# Patient Record
Sex: FEMALE | Race: BLACK OR AFRICAN AMERICAN | NOT HISPANIC OR LATINO | Employment: FULL TIME | URBAN - METROPOLITAN AREA
[De-identification: names, ages, dates, MRNs, and addresses within clinical notes are randomized per-mention and may not be internally consistent; named-entity substitution may affect disease eponyms.]

---

## 2017-01-05 ENCOUNTER — OFFICE VISIT (OUTPATIENT)
Dept: PODIATRY | Facility: CLINIC | Age: 52
End: 2017-01-05
Payer: COMMERCIAL

## 2017-01-05 VITALS
WEIGHT: 196.31 LBS | HEART RATE: 105 BPM | DIASTOLIC BLOOD PRESSURE: 88 MMHG | HEIGHT: 59 IN | BODY MASS INDEX: 39.57 KG/M2 | SYSTOLIC BLOOD PRESSURE: 130 MMHG

## 2017-01-05 DIAGNOSIS — M77.8 EXTENSOR TENDINITIS OF FOOT: ICD-10-CM

## 2017-01-05 DIAGNOSIS — E11.9 TYPE 2 DIABETES MELLITUS WITHOUT COMPLICATION, WITHOUT LONG-TERM CURRENT USE OF INSULIN: ICD-10-CM

## 2017-01-05 DIAGNOSIS — M79.672 FOOT PAIN, LEFT: Primary | ICD-10-CM

## 2017-01-05 DIAGNOSIS — M21.40 PES PLANUS, UNSPECIFIED LATERALITY: ICD-10-CM

## 2017-01-05 PROCEDURE — 99999 PR PBB SHADOW E&M-EST. PATIENT-LVL III: CPT | Mod: PBBFAC,,, | Performed by: PODIATRIST

## 2017-01-05 PROCEDURE — 99204 OFFICE O/P NEW MOD 45 MIN: CPT | Mod: S$GLB,,, | Performed by: PODIATRIST

## 2017-01-05 PROCEDURE — 3044F HG A1C LEVEL LT 7.0%: CPT | Mod: S$GLB,,, | Performed by: PODIATRIST

## 2017-01-05 PROCEDURE — 3079F DIAST BP 80-89 MM HG: CPT | Mod: S$GLB,,, | Performed by: PODIATRIST

## 2017-01-05 PROCEDURE — 3060F POS MICROALBUMINURIA REV: CPT | Mod: S$GLB,,, | Performed by: PODIATRIST

## 2017-01-05 PROCEDURE — 3075F SYST BP GE 130 - 139MM HG: CPT | Mod: S$GLB,,, | Performed by: PODIATRIST

## 2017-01-05 PROCEDURE — 1159F MED LIST DOCD IN RCRD: CPT | Mod: S$GLB,,, | Performed by: PODIATRIST

## 2017-01-05 PROCEDURE — 2022F DILAT RTA XM EVC RTNOPTHY: CPT | Mod: S$GLB,,, | Performed by: PODIATRIST

## 2017-01-05 NOTE — LETTER
January 5, 2017      Malini Sullivan MD  [address]  [facility] [address]           [facility]  [address]  [facility] [address]  Phone: [phone]          Patient: Amanda Greenwood   MR Number: [id number]   YOB: 1965   Date of Visit: 1/5/2017       Dear Dr. Malini Sullivan:    Thank you for referring Amanda Greenwood to me for evaluation. Attached you will find relevant portions of my assessment and plan of care.    If you have questions, please do not hesitate to call me. I look forward to following Amanda Greenwood along with you.    Sincerely,    Dominik Capps, ANNA    Enclosure  CC:  No Recipients    If you would like to receive this communication electronically, please contact [email or web address] or [phone] to request more information on [facility] access.    For providers and/or their staff who would like to refer a patient to [facility], please contact us through our one-stop-shop provider referral line, [facility], at [phone].    If you feel you have received this communication in error or would no longer like to receive these types of communications, please e-mail [email or web address]

## 2017-01-05 NOTE — PROGRESS NOTES
"Subjective:      Patient ID: Amanda Greenwood is a 51 y.o. female.    Chief Complaint: Diabetes Mellitus (PCP 12/19/16 Dr. Sullivan); Diabetic Foot Exam; Routine Foot Care; Foot Problem (left ft./ xray done per PCP); and Foot Pain  Patient presents to clinic with the complaint of pain in the Lt. Dorsal midfoot x 4 months.  Describes pain as a combination of "crampy and aching", and currently rates symptoms as a 2/10.  States symptoms are exacerbated with prolonged weight bearing and walking.  Symptoms are alleviated with rest.  Relates having a previous callus to the plantar aspect of the same foot that was changing her gait on account of pain.  States that she recently trimmed the lesion, however, she continues to have the dorsal foot pain.  Denies trauma to the affected foot.  Has not attempted to self treat.  Denies any additional pedal complaints.        Past Medical History   Diagnosis Date    Obesity, Class II, BMI 35-39.9, with comorbidity 12/19/2016    Vitamin D deficiency disease 3/24/2015       Past Surgical History   Procedure Laterality Date    Hysterectomy       TLH WITHOUT BSO       Family History   Problem Relation Age of Onset    Diabetes Father     COPD Father     Hypertension Mother     Hypertension Brother     No Known Problems Daughter     No Known Problems Daughter     Glaucoma Neg Hx     Breast cancer Neg Hx     Colon cancer Neg Hx     Ovarian cancer Neg Hx        Social History     Social History    Marital status:      Spouse name: N/A    Number of children: 2    Years of education: N/A     Social History Main Topics    Smoking status: Never Smoker    Smokeless tobacco: Never Used    Alcohol use Yes      Comment: ocassionally    Drug use: No    Sexual activity: Yes     Partners: Male     Birth control/ protection: None     Other Topics Concern    None     Social History Narrative       Current Outpatient Prescriptions   Medication Sig Dispense Refill    amlodipine " (NORVASC) 10 MG tablet Take 1 tablet (10 mg total) by mouth once daily. 30 tablet 12    atorvastatin (LIPITOR) 20 MG tablet Take 1 tablet (20 mg total) by mouth once daily. 30 tablet 11    blood sugar diagnostic (ONE TOUCH ULTRA TEST) Strp 1 each by Misc.(Non-Drug; Combo Route) route daily as needed. 35 strip 6    lancets Misc 1 each by Misc.(Non-Drug; Combo Route) route once daily. 100 each 3    metformin (GLUCOPHAGE) 500 MG tablet Take 1 tablet (500 mg total) by mouth daily with breakfast. 30 tablet 12    triamterene-hydrochlorothiazide 37.5-25 mg (DYAZIDE) 37.5-25 mg per capsule Take 1 capsule by mouth every morning. 30 capsule 12    VITAMIN D2 50,000 unit capsule Take 1 capsule (50,000 Units total) by mouth every 7 days. 12 capsule 11     No current facility-administered medications for this visit.        Review of patient's allergies indicates:   Allergen Reactions    Ace inhibitors      Other reaction(s): cough         Review of Systems   Constitution: Negative for chills and fever.   Cardiovascular: Negative for claudication and leg swelling.   Skin: Negative for color change and nail changes.   Musculoskeletal: Negative for arthritis, joint pain and joint swelling.   Neurological: Negative for numbness and paresthesias.   Psychiatric/Behavioral: Negative for altered mental status.           Objective:      Physical Exam   Constitutional: She is oriented to person, place, and time. She appears well-developed and well-nourished. No distress.   Cardiovascular:   Pulses:       Dorsalis pedis pulses are 2+ on the right side, and 2+ on the left side.        Posterior tibial pulses are 2+ on the right side, and 2+ on the left side.   CFT <3 seconds bilateral.  Pedal hair growth present bilateral.   No varicosities noted bilateral.  No bilateral lower extremity edema.  Toes are warm to touch bilateral.     Musculoskeletal: She exhibits tenderness. She exhibits no edema.   Muscle strength 5/5 in all muscle  groups bilateral.  No tenderness nor crepitation with ROM of foot/ankle joints bilateral.  Bilateral pes planus foot type.  Mild pain along the extensor tendons of the Lt. Midfoot. Negative for pain with passive flexion and extension of same sided toes.     Neurological: She is alert and oriented to person, place, and time. She has normal strength. No sensory deficit.   Protective sensation per Meherrin-Gayatri monofilament intact bilateral.    Light touch intact bilateral.   Skin: Skin is warm, dry and intact. Lesion noted. No abrasion, no bruising, no burn, no ecchymosis, no laceration, no petechiae and no rash noted. She is not diaphoretic. No cyanosis or erythema. No pallor. Nails show no clubbing.   Skin normal turgor, temperature, and texture bilateral.  Toenails x 10 appear normotrophic. Remnants of a recently trimmed porokeratosis of the Lt. sub 3rd metatarsal head.  Examination of the skin reveals no evidence of significant maceration, rashes, open lesions, suspicious appearing nevi or other concerning lesions.              Assessment:       Encounter Diagnoses   Name Primary?    Foot pain, left Yes    Extensor tendinitis of foot - Left Foot     Pes planus, unspecified laterality     Type 2 diabetes mellitus without complication, without long-term current use of insulin          Plan:       Amanda was seen today for diabetes mellitus, diabetic foot exam, routine foot care, foot problem and foot pain.    Diagnoses and all orders for this visit:    Foot pain, left    Extensor tendinitis of foot - Left Foot    Pes planus, unspecified laterality    Type 2 diabetes mellitus without complication, without long-term current use of insulin      I counseled the patient on her conditions, their implications and medical management.    Feel that patient was compensating to reduce pressure to prior porokeratosis of the Lt. Sub 3rd met head resulting in current extensor tendonitis.      Advised to RICE the affected  extremity to help with pain symptoms.    Advised to apply a topical analgesic to the affected foot as well.      Reviewed both shoes and insoles that provide better support.      Discussed avoidance of unsupportive shoes and barefoot walking x 6 weeks.    Reviewed x-rays which were negative for trauma     RTC in 1 year for annual diabetic exam.    Dominik Capps DPM

## 2017-01-05 NOTE — PATIENT INSTRUCTIONS
Recommend applying aspercream to the Lt. Foot daily to help with pain symptoms.    Recommend icing the Lt. Foot up to 30 minutes daily.    Recommend avoidance of flats and barefoot walking for the next 6 weeks.    Recommend wearing shoes with more support across the midfoot.

## 2017-03-18 DIAGNOSIS — I10 ESSENTIAL HYPERTENSION: ICD-10-CM

## 2017-03-18 RX ORDER — TRIAMTERENE AND HYDROCHLOROTHIAZIDE 37.5; 25 MG/1; MG/1
CAPSULE ORAL
Qty: 30 CAPSULE | Refills: 0 | Status: SHIPPED | OUTPATIENT
Start: 2017-03-18 | End: 2017-04-26 | Stop reason: SDUPTHER

## 2017-03-20 ENCOUNTER — TELEPHONE (OUTPATIENT)
Dept: INTERNAL MEDICINE | Facility: CLINIC | Age: 52
End: 2017-03-20

## 2017-03-20 DIAGNOSIS — E11.8 TYPE 2 DIABETES MELLITUS WITH COMPLICATION, WITHOUT LONG-TERM CURRENT USE OF INSULIN: Primary | ICD-10-CM

## 2017-03-20 NOTE — TELEPHONE ENCOUNTER
She no showed today.    This was for her physical.  She needs to have fasting labs and a urine and needs to see me for a physical, please contact her to reschedule everything.  Orders in for labs and urine.  Thank you

## 2017-03-21 NOTE — TELEPHONE ENCOUNTER
Call pt no answer left detail message on voice mail about appointments being reschedule for her physical   Apt schedule and mail

## 2017-04-12 ENCOUNTER — LAB VISIT (OUTPATIENT)
Dept: LAB | Facility: HOSPITAL | Age: 52
End: 2017-04-12
Attending: INTERNAL MEDICINE
Payer: COMMERCIAL

## 2017-04-12 DIAGNOSIS — E11.9 TYPE 2 DIABETES MELLITUS WITHOUT COMPLICATION: ICD-10-CM

## 2017-04-12 DIAGNOSIS — E78.2 MIXED HYPERLIPIDEMIA: ICD-10-CM

## 2017-04-12 DIAGNOSIS — E11.8 TYPE 2 DIABETES MELLITUS WITH COMPLICATION, WITHOUT LONG-TERM CURRENT USE OF INSULIN: ICD-10-CM

## 2017-04-12 DIAGNOSIS — E55.9 VITAMIN D DEFICIENCY DISEASE: ICD-10-CM

## 2017-04-12 LAB
25(OH)D3+25(OH)D2 SERPL-MCNC: 40 NG/ML
ALBUMIN SERPL BCP-MCNC: 3.6 G/DL
ALP SERPL-CCNC: 71 U/L
ALT SERPL W/O P-5'-P-CCNC: 22 U/L
ANION GAP SERPL CALC-SCNC: 12 MMOL/L
AST SERPL-CCNC: 22 U/L
BILIRUB SERPL-MCNC: 0.3 MG/DL
BUN SERPL-MCNC: 21 MG/DL
CALCIUM SERPL-MCNC: 9.9 MG/DL
CHLORIDE SERPL-SCNC: 98 MMOL/L
CHOLEST/HDLC SERPL: 3.9 {RATIO}
CO2 SERPL-SCNC: 29 MMOL/L
CREAT SERPL-MCNC: 0.8 MG/DL
EST. GFR  (AFRICAN AMERICAN): >60 ML/MIN/1.73 M^2
EST. GFR  (NON AFRICAN AMERICAN): >60 ML/MIN/1.73 M^2
GLUCOSE SERPL-MCNC: 123 MG/DL
HDL/CHOLESTEROL RATIO: 25.5 %
HDLC SERPL-MCNC: 192 MG/DL
HDLC SERPL-MCNC: 49 MG/DL
LDLC SERPL CALC-MCNC: 101.2 MG/DL
NONHDLC SERPL-MCNC: 143 MG/DL
POTASSIUM SERPL-SCNC: 4 MMOL/L
PROT SERPL-MCNC: 8.6 G/DL
SODIUM SERPL-SCNC: 139 MMOL/L
TRIGL SERPL-MCNC: 209 MG/DL

## 2017-04-12 PROCEDURE — 36415 COLL VENOUS BLD VENIPUNCTURE: CPT | Mod: PO

## 2017-04-12 PROCEDURE — 80061 LIPID PANEL: CPT

## 2017-04-12 PROCEDURE — 80053 COMPREHEN METABOLIC PANEL: CPT

## 2017-04-12 PROCEDURE — 83036 HEMOGLOBIN GLYCOSYLATED A1C: CPT

## 2017-04-12 PROCEDURE — 82306 VITAMIN D 25 HYDROXY: CPT

## 2017-04-13 ENCOUNTER — PATIENT MESSAGE (OUTPATIENT)
Dept: INTERNAL MEDICINE | Facility: CLINIC | Age: 52
End: 2017-04-13

## 2017-04-13 LAB
ESTIMATED AVG GLUCOSE: 143 MG/DL
ESTIMATED AVG GLUCOSE: 143 MG/DL
HBA1C MFR BLD HPLC: 6.6 %
HBA1C MFR BLD HPLC: 6.6 %

## 2017-04-26 ENCOUNTER — TELEPHONE (OUTPATIENT)
Dept: INTERNAL MEDICINE | Facility: CLINIC | Age: 52
End: 2017-04-26

## 2017-04-26 ENCOUNTER — OFFICE VISIT (OUTPATIENT)
Dept: INTERNAL MEDICINE | Facility: CLINIC | Age: 52
End: 2017-04-26
Payer: COMMERCIAL

## 2017-04-26 VITALS
BODY MASS INDEX: 40 KG/M2 | DIASTOLIC BLOOD PRESSURE: 80 MMHG | SYSTOLIC BLOOD PRESSURE: 120 MMHG | WEIGHT: 198.44 LBS | HEIGHT: 59 IN

## 2017-04-26 DIAGNOSIS — E66.01 MORBID OBESITY WITH BMI OF 40.0-44.9, ADULT: ICD-10-CM

## 2017-04-26 DIAGNOSIS — E55.9 VITAMIN D DEFICIENCY DISEASE: ICD-10-CM

## 2017-04-26 DIAGNOSIS — E78.2 MIXED HYPERLIPIDEMIA: ICD-10-CM

## 2017-04-26 DIAGNOSIS — Z00.00 ANNUAL PHYSICAL EXAM: Primary | ICD-10-CM

## 2017-04-26 DIAGNOSIS — I10 ESSENTIAL HYPERTENSION: ICD-10-CM

## 2017-04-26 DIAGNOSIS — E11.8 TYPE 2 DIABETES MELLITUS WITH COMPLICATION, WITHOUT LONG-TERM CURRENT USE OF INSULIN: ICD-10-CM

## 2017-04-26 PROCEDURE — 99396 PREV VISIT EST AGE 40-64: CPT | Mod: S$GLB,,, | Performed by: INTERNAL MEDICINE

## 2017-04-26 PROCEDURE — 3079F DIAST BP 80-89 MM HG: CPT | Mod: S$GLB,,, | Performed by: INTERNAL MEDICINE

## 2017-04-26 PROCEDURE — 99999 PR PBB SHADOW E&M-EST. PATIENT-LVL III: CPT | Mod: PBBFAC,,, | Performed by: INTERNAL MEDICINE

## 2017-04-26 PROCEDURE — 3074F SYST BP LT 130 MM HG: CPT | Mod: S$GLB,,, | Performed by: INTERNAL MEDICINE

## 2017-04-26 RX ORDER — PHENTERMINE HYDROCHLORIDE 37.5 MG/1
37.5 TABLET ORAL
Qty: 30 TABLET | Refills: 0 | Status: SHIPPED | OUTPATIENT
Start: 2017-04-26 | End: 2017-04-26 | Stop reason: SDUPTHER

## 2017-04-26 RX ORDER — ASPIRIN 81 MG/1
81 TABLET ORAL DAILY
Qty: 30 TABLET | Refills: 12
Start: 2017-04-26 | End: 2018-06-29 | Stop reason: SDUPTHER

## 2017-04-26 RX ORDER — PHENTERMINE HYDROCHLORIDE 37.5 MG/1
37.5 TABLET ORAL
Qty: 30 TABLET | Refills: 0 | Status: SHIPPED | OUTPATIENT
Start: 2017-04-26 | End: 2017-06-06 | Stop reason: SDUPTHER

## 2017-04-26 RX ORDER — ATORVASTATIN CALCIUM 20 MG/1
20 TABLET, FILM COATED ORAL DAILY
Qty: 30 TABLET | Refills: 11 | Status: SHIPPED | OUTPATIENT
Start: 2017-04-26 | End: 2018-06-29 | Stop reason: SDUPTHER

## 2017-04-26 NOTE — TELEPHONE ENCOUNTER
----- Message from Ani Kaplan sent at 4/26/2017 12:18 PM CDT -----  Contact: Pt at [phone]  Pt said diet pill that was supposed to be called in to pharmacy was not called in.     [facility]  [phone] (Phone)  [phone] (Fax)

## 2017-04-26 NOTE — PROGRESS NOTES
Subjective:       Patient ID: Amanda Greenwood is a 51 y.o. female.    Chief Complaint: Annual Exam    HPI Comments: Annual exam    Doing pretty well, A1c 6.6- discussed.    Weight up- BNI 40- reviewed.  Options for Adipex, bariatric medicine.   Exercise reviewed.    Poor sleep- reviewed.  Falls asleep but wakes up a lot.  Caffeine and sleep hygiene reviewed.  No CARLOS sx.    Patient Active Problem List:     Mixed hyperlipidemia     Vitamin D deficiency disease     Essential hypertension     Type 2 diabetes mellitus with complication, without long-term current use of insulin     Morbid obesity with BMI of 40.0-44.9, adult          Review of Systems   Constitutional: Negative for activity change, appetite change, chills, fatigue and fever.   HENT: Negative for ear discharge, nosebleeds, sinus pressure and sore throat.    Eyes: Negative for visual disturbance.   Respiratory: Negative for apnea, cough, chest tightness, shortness of breath and wheezing.    Cardiovascular: Negative for chest pain, palpitations and leg swelling.   Gastrointestinal: Negative for abdominal distention, abdominal pain, anal bleeding, blood in stool, constipation, diarrhea, nausea and vomiting.   Genitourinary: Negative for dysuria, frequency, hematuria and vaginal bleeding.   Musculoskeletal: Negative for gait problem, joint swelling and myalgias.   Skin: Negative for rash.   Neurological: Negative for dizziness, tremors, weakness, light-headedness and headaches.   Hematological: Negative for adenopathy. Does not bruise/bleed easily.   Psychiatric/Behavioral: Positive for sleep disturbance. Negative for confusion, hallucinations and suicidal ideas.       Objective:      Physical Exam   Constitutional: She is oriented to person, place, and time. She appears well-developed and well-nourished.   HENT:   Head: Normocephalic and atraumatic.   Right Ear: External ear normal.   Left Ear: External ear normal.   Nose: Nose normal.   Mouth/Throat: Oropharynx  is clear and moist. No oropharyngeal exudate.   Eyes: Conjunctivae and EOM are normal. No scleral icterus.   Neck: Normal range of motion. Neck supple. No JVD present. No thyromegaly present.   Cardiovascular: Normal rate, regular rhythm, normal heart sounds and intact distal pulses.  Exam reveals no gallop.    No murmur heard.  Pulmonary/Chest: Effort normal and breath sounds normal. No respiratory distress. She has no wheezes. She exhibits no tenderness.   Abdominal: Soft. Bowel sounds are normal. She exhibits no distension and no mass. There is no tenderness. There is no rebound and no guarding.   Musculoskeletal: Normal range of motion. She exhibits no edema or tenderness.   Lymphadenopathy:     She has no cervical adenopathy.   Neurological: She is alert and oriented to person, place, and time. She displays normal reflexes. No cranial nerve deficit. Coordination normal.   Skin: Skin is warm. No rash noted. No erythema.   Psychiatric: She has a normal mood and affect. Her behavior is normal. Judgment and thought content normal.   Nursing note and vitals reviewed.      Assessment:       1. Annual physical exam    2. Morbid obesity with BMI of 40.0-44.9, adult    3. Mixed hyperlipidemia    4. Type 2 diabetes mellitus with complication, without long-term current use of insulin    5. Essential hypertension        Plan:         Annual physical exam    Morbid obesity with BMI of 40.0-44.9, adult  -     phentermine (ADIPEX-P) 37.5 mg tablet; Take 1 tablet (37.5 mg total) by mouth before breakfast.  Dispense: 30 tablet; Refill: 0. Cautions and s/e reviewed  -     Ambulatory consult to Bariatric Surgery    Mixed hyperlipidemia  -     atorvastatin (LIPITOR) 20 MG tablet; Take 1 tablet (20 mg total) by mouth once daily.  Dispense: 30 tablet; Refill: 11- cautions and s/e reviewed    Type 2 diabetes mellitus with complication, without long-term current use of insulin: stable    Essential hypertension: continue meds    Other  orders  -     aspirin (ECOTRIN) 81 MG EC tablet; Take 1 tablet (81 mg total) by mouth once daily.  Dispense: 30 tablet; Refill: 12    Labs in 6 months  F/u by phone/email 1 month weight and BP check in  Bariatric medicine  Lifestyle issues reviewed  Sleep hygiene reviewed

## 2017-04-26 NOTE — PATIENT INSTRUCTIONS
Insomnia  Insomnia is repeated difficulty going to sleep or staying asleep, or both. Whether you have insomnia is not defined by a specific amount of sleep. Different people need different amounts of sleep, and you may need more or less sleep at different times of your life.  There are 3 major types of insomnia:  short-term, chronic, and other.  Short-term, or acute insomnia lasts less than 3 months.  The symptoms are temporary and can be linked directly to a stressor, such as the death of a loved one, financial problems, or a new physical problem.  Short-term insomnia stops when the stressor resolves or the person adapts to its presence.  Chronic insomnia occurs at least 3 times a week and lasts longer than 3 months.  Chronic insomnia can occur when either the cause of the sleeping problem is not clear, or the insomnia does not get better when the stressor is resolved. A number of other criteria are also used to make the diagnosis of chronic insomnia.   Other insomnia is the third type of insomnia-related sleep disorders.  This description applies to people who have problems getting to sleep or staying asleep, but do not meet all of the factors that describe either short-term or chronic insomnia.    Many things cause insomnia. Different people may have different causes. It can be from an underlying medical or psychological condition, or lifestyle. It can also be primary insomnia, which means no cause can be found.  Causes of insomnia include:  · Chronic medical problems- heart disease, gastrointestinal problems, hormonal changes, breathing problems  · Anxiety  · Stress  · Depression  · Pain  · Work schedule  · Sleep apnea  · Illegal drugs  · Certain medicines  Many different medidcines can affect your sleep, such as stimulants, caffeine, alcohol, some decongestants, and diet pills. Other medicines may include some types of blood pressure pills, steroids, asthma medicines, antihistamines, antidepressants,  seizure medicines and statins. Not all of these will affect your sleep, and they shouldnt be stopped without talking to your doctor.  Symptoms of insomnia can include:  · Lying awake for long periods at night before falling asleep  · Waking up several times during the night  · Waking up early in the morning and not being able to get back to sleep  · Feeling tired and not refreshed by sleep  · Not being able to function properly during the day and finding it hard to concentrate  · Irritability  · Tiredness and fatigue during the day  Home care  1. Review your medicines with your doctor or pharmacist to find out if they can cause insomnia. Not all medicines will affect your sleep, but they shouldn't be stopped without reviewing them with your doctor. There may be serious side effects and consequences from suddenly stopping your medicines. Not taking them may cause strokes, heart attacks, and many other problems.  2. Caffeine, smoking and alcohol also affect sleep. Limit your daily use and do not use these before bedtime. Alcohol may make you sleepy at first, but as its effects wear off, you may awaken a few hours later and have trouble returning to sleep.  3. Do not exercise, eat or drink large amounts of liquid within 2 hours of your bedtime.  4. Improve your sleep habits. Have a fixed bed and wake-up time. Try to keep noise, light and heat in your bedroom at a comfortable level. Try using earplugs or eyeshades if needed.   5. Avoid watching TV in bed.  6. If you do not fall asleep within 30 minutes, try to relax by reading or listening to soft music.  7. Limit daytime napping to one 30 minute period, early in the day.  8. Get regular exercise. Find other ways to lessen your stress level.  9. If a medicine was prescribed to help reset your sleep patterns, take it as directed. Sleeping pills are intended for short-term use, only. If taken for too long, the effect wears off while the risk of physical addiction and  psychological dependence increases.  Sleep diary  If the cause isnt obvious and it is not improving, try keeping a sleep diary for a couple of weeks. Include in it:  · The time you go to bed  · How long it takes to fall asleep  · How many times you wake up  · What time you wake up  · Your meal times and what you eat  · What time you drink alcohol  · Your exercise habits and times  Follow-up care  Follow up with your healthcare provider, or as advised. If X-rays or CT scans were done, you will be notified if there is a change in the reading, especially if it affects treatment.  Call 911  Call 911 if any of these occur:  · Trouble breathing  · Confusion or trouble waking  · Fainting or loss of consciousness  · Rapid heart rate  · New chest, arm, shoulder, neck or upper back pain  · Trouble with speech or vision, weakness of an arm or leg  · Trouble walking or talking, loss of balance, numbness or weakness in one side of your body, facial droop  When to seek medical advice  Call your healthcare provider right away if any of these occur:  · Extreme restlessness or irritability  · Confusion or hallucinations (seeing or hearing things that are not there)  · Anxiety, depression  · Several days without sleeping  Date Last Reviewed: 11/19/2015  © 4063-1036 [employer]. [address] [place] [address], [place] [address]. All rights reserved. This information is not intended as a substitute for professional medical care. Always follow your healthcare professional's instructions.        Treating Insomnia  Good sleeping habits are a key part of treatment. If needed, some medications may help you sleep better at first. Making healthy lifestyle changes and learning to relax can improve your sleep. Treating insomnia takes commitment, but trust that your efforts will pay off. Talk to your health care provider before taking any medication.    Healthy lifestyle  Your lifestyle affects your health and your sleep. Here are some  healthy habits:  · Keep a regular sleep schedule. Go to bed and get up at the same time each day.  · Exercise regularly. It may help you reduce stress. Avoid strenuous exercise for 2 to 4 hours before bedtime.  · Avoid or limit naps, especially in the late afternoon.  · Use your bed only for sleep and sex.  · Dont spend too much time in bed trying to fall asleep. If you cant fall asleep, get up and do something (no electronics) until you become tired and drowsy.  · Avoid or limit caffeine and nicotine for up to 6 hours before bedtime. They can keep you awake at night.   · Also avoid alcohol for at least 4 to 6 hours before bedtime. It may help you fall asleep at first, but you will have more awakenings throughout the night, and your sleep will not be restful.  Before bedtime  To sleep better every night, try these tips:  · Have a bedtime routine to let your body and mind know when its time to sleep.  · Think of going to bed as relaxing and enjoyable. Sleep will come sooner.  · If your worries dont let you sleep, write them down in a diary. Then close it, and go to bed.  · Make sure the room is not too hot or too cold. If its not dark enough, an eye mask can help. If its noisy, try using earplugs.  Learn to relax  Stress, anxiety, and body tension may keep you awake at night. To unwind before bedtime, try a warm bath, meditation, or yoga. Also, try the following:  · Deep breathing. Sit or lie back in a chair. Take a slow, deep breath. Hold it for 5 counts. Then breathe out slowly through your mouth. Keep doing this until you feel relaxed.  · Progressive muscle relaxation. Tense and then relax the muscles in your body as you breathe deeply. Start with your feet and work up your body to your neck and face.  Date Last Reviewed: 7/18/2015 © 2000-2016 [employer]. [address] [place] [address], [place] [address]. All rights reserved. This information is not intended as a substitute for professional medical  care. Always follow your healthcare professional's instructions.        What is Insomnia?  Do you have trouble falling asleep? Do you wake up often during the night? Or, maybe you wake up too early in the morning. You may be suffering from insomnia. Talk to your health care provider if it lasts longer than a few weeks and you feel tired most of the time.    What causes insomnia?  Some common causes of insomnia are:  · Medical problems such as pain, depression, medication side effects, or trouble breathing  · Circadian rhythm disorder, a shift in the bodys normal 24-hour activity cycle  · Lifestyle factors such as a changing sleep schedule, lack of exercise, or too much caffeine  · Sleep settings such as a poor mattress, noise, or a room thats too hot or too cold  · Stress such as problems at work, money worries, or family events  Talk to your health care provider  Describe your sleeping problems to your health care provider. Try to keep a daily sleep diary for a couple weeks. Write down the time you go to bed, the time you wake up, and anything that seems to affect your sleep. Your health care provider can work with you to develop a treatment plan. You may need to learn good sleeping habits and make some lifestyle changes. If you have any medical problems, these may need to be treated first.  Date Last Reviewed: 7/18/2015 © 2000-2016 [employer]. [address] [place] [address], [place] [address]. All rights reserved. This information is not intended as a substitute for professional medical care. Always follow your healthcare professional's instructions.

## 2017-04-26 NOTE — MR AVS SNAPSHOT
[facility]  [address]  [place] [address]  Phone: [phone]  Fax: [phone]                  Amanda Greenwood   2017 10:00 AM   Office Visit    Description:  Female : 1965   Provider:  Malini Sullivan MD   Department:  [facility]           Reason for Visit     Annual Exam           Diagnoses this Visit        Comments    Annual physical exam    -  Primary     Morbid obesity with BMI of 40.0-44.9, adult         Mixed hyperlipidemia         Type 2 diabetes mellitus with complication, without long-term current use of insulin         Essential hypertension         Vitamin D deficiency disease                To Do List           Goals (5 Years of Data)     None      Follow-Up and Disposition     Return in about 6 months (around 10/26/2017) for EP with labs prior.    Follow-up and Disposition History       These Medications        Disp Refills Start End    aspirin (ECOTRIN) 81 MG EC tablet 30 tablet 12 2017    Take 1 tablet (81 mg total) by mouth once daily. - Oral    Pharmacy: [facility] [address] [place] AT [facility] Ph #: [phone]       phentermine (ADIPEX-P) 37.5 mg tablet 30 tablet 0 2017    Take 1 tablet (37.5 mg total) by mouth before breakfast. - Oral    Pharmacy: [facility] [address] [place] [facility] Ph #: [phone]       atorvastatin (LIPITOR) 20 MG tablet 30 tablet 11 2017    Take 1 tablet (20 mg total) by mouth once daily. - Oral    Pharmacy: [facility] [address] [place] [address] [place] AT [facility] Ph #: [phone]         [facility]     [facility] Nurse Care Line - / Assistance  Unless otherwise directed by your provider, please contact [facility], our nurse care line that is available for 24/ assistance.     Registered  nurses in the [facility] provide: appointment scheduling, clinical advisement, health education, and other advisory services.  Call: [phone] (toll free)               Medications           Message regarding Medications     Verify the changes and/or additions to your medication regime listed below are the same as discussed with your clinician today.  If any of these changes or additions are incorrect, please notify your healthcare provider.        START taking these NEW medications        Refills    aspirin (ECOTRIN) 81 MG EC tablet 12    Sig: Take 1 tablet (81 mg total) by mouth once daily.    Class: No Print    Route: Oral    phentermine (ADIPEX-P) 37.5 mg tablet 0    Sig: Take 1 tablet (37.5 mg total) by mouth before breakfast.    Class: Print    Route: Oral           Verify that the below list of medications is an accurate representation of the medications you are currently taking.  If none reported, the list may be blank. If incorrect, please contact your healthcare provider. Carry this list with you in case of emergency.           Current Medications     amlodipine (NORVASC) 10 MG tablet Take 1 tablet (10 mg total) by mouth once daily.    blood sugar diagnostic (ONE TOUCH ULTRA TEST) Strp 1 each by Misc.(Non-Drug; Combo Route) route daily as needed.    lancets Misc 1 each by Misc.(Non-Drug; Combo Route) route once daily.    metformin (GLUCOPHAGE) 500 MG tablet Take 1 tablet (500 mg total) by mouth daily with breakfast.    triamterene-hydrochlorothiazide 37.5-25 mg (DYAZIDE) 37.5-25 mg per capsule Take 1 capsule by mouth every morning.    VITAMIN D2 50,000 unit capsule Take 1 capsule (50,000 Units total) by mouth every 7 days.    aspirin (ECOTRIN) 81 MG EC tablet Take 1 tablet (81 mg total) by mouth once daily.    atorvastatin (LIPITOR) 20 MG tablet Take 1 tablet (20 mg total) by mouth once daily.    phentermine (ADIPEX-P) 37.5 mg tablet Take 1 tablet (37.5 mg total) by mouth before breakfast.  "          Clinical Reference Information           Your Vitals Were     BP Height Weight BMI       120/80 (BP Location: Right arm, Patient Position: Sitting, BP Method: Manual) 4' 11" (1.499 m) 90 kg (198 lb 6.6 oz) 40.07 kg/m2       Blood Pressure          Most Recent Value    BP  120/80      Allergies as of 4/26/2017     Ace Inhibitors      Immunizations Administered on Date of Encounter - 4/26/2017     None      Orders Placed During Today's Visit      Normal Orders This Visit    Ambulatory consult to Bariatric Surgery     Future Labs/Procedures Expected by Expires    Hemoglobin A1c  10/23/2017 (Approximate) 12/2/2017    Vitamin D  10/23/2017 (Approximate) 12/2/2017    Comprehensive metabolic panel  10/27/2017 4/26/2018    Lipid panel  10/27/2017 4/26/2018      Instructions      Insomnia  Insomnia is repeated difficulty going to sleep or staying asleep, or both. Whether you have insomnia is not defined by a specific amount of sleep. Different people need different amounts of sleep, and you may need more or less sleep at different times of your life.  There are 3 major types of insomnia:  short-term, chronic, and other.  Short-term, or acute insomnia lasts less than 3 months.  The symptoms are temporary and can be linked directly to a stressor, such as the death of a loved one, financial problems, or a new physical problem.  Short-term insomnia stops when the stressor resolves or the person adapts to its presence.  Chronic insomnia occurs at least 3 times a week and lasts longer than 3 months.  Chronic insomnia can occur when either the cause of the sleeping problem is not clear, or the insomnia does not get better when the stressor is resolved. A number of other criteria are also used to make the diagnosis of chronic insomnia.   Other insomnia is the third type of insomnia-related sleep disorders.  This description applies to people who have problems getting to sleep or staying asleep, but do not meet all of " the factors that describe either short-term or chronic insomnia.    Many things cause insomnia. Different people may have different causes. It can be from an underlying medical or psychological condition, or lifestyle. It can also be primary insomnia, which means no cause can be found.  Causes of insomnia include:  · Chronic medical problems- heart disease, gastrointestinal problems, hormonal changes, breathing problems  · Anxiety  · Stress  · Depression  · Pain  · Work schedule  · Sleep apnea  · Illegal drugs  · Certain medicines  Many different medidcines can affect your sleep, such as stimulants, caffeine, alcohol, some decongestants, and diet pills. Other medicines may include some types of blood pressure pills, steroids, asthma medicines, antihistamines, antidepressants, seizure medicines and statins. Not all of these will affect your sleep, and they shouldnt be stopped without talking to your doctor.  Symptoms of insomnia can include:  · Lying awake for long periods at night before falling asleep  · Waking up several times during the night  · Waking up early in the morning and not being able to get back to sleep  · Feeling tired and not refreshed by sleep  · Not being able to function properly during the day and finding it hard to concentrate  · Irritability  · Tiredness and fatigue during the day  Home care  1. Review your medicines with your doctor or pharmacist to find out if they can cause insomnia. Not all medicines will affect your sleep, but they shouldn't be stopped without reviewing them with your doctor. There may be serious side effects and consequences from suddenly stopping your medicines. Not taking them may cause strokes, heart attacks, and many other problems.  2. Caffeine, smoking and alcohol also affect sleep. Limit your daily use and do not use these before bedtime. Alcohol may make you sleepy at first, but as its effects wear off, you may awaken a few hours later and have trouble returning  to sleep.  3. Do not exercise, eat or drink large amounts of liquid within 2 hours of your bedtime.  4. Improve your sleep habits. Have a fixed bed and wake-up time. Try to keep noise, light and heat in your bedroom at a comfortable level. Try using earplugs or eyeshades if needed.   5. Avoid watching TV in bed.  6. If you do not fall asleep within 30 minutes, try to relax by reading or listening to soft music.  7. Limit daytime napping to one 30 minute period, early in the day.  8. Get regular exercise. Find other ways to lessen your stress level.  9. If a medicine was prescribed to help reset your sleep patterns, take it as directed. Sleeping pills are intended for short-term use, only. If taken for too long, the effect wears off while the risk of physical addiction and psychological dependence increases.  Sleep diary  If the cause isnt obvious and it is not improving, try keeping a sleep diary for a couple of weeks. Include in it:  · The time you go to bed  · How long it takes to fall asleep  · How many times you wake up  · What time you wake up  · Your meal times and what you eat  · What time you drink alcohol  · Your exercise habits and times  Follow-up care  Follow up with your healthcare provider, or as advised. If X-rays or CT scans were done, you will be notified if there is a change in the reading, especially if it affects treatment.  Call 911  Call 911 if any of these occur:  · Trouble breathing  · Confusion or trouble waking  · Fainting or loss of consciousness  · Rapid heart rate  · New chest, arm, shoulder, neck or upper back pain  · Trouble with speech or vision, weakness of an arm or leg  · Trouble walking or talking, loss of balance, numbness or weakness in one side of your body, facial droop  When to seek medical advice  Call your healthcare provider right away if any of these occur:  · Extreme restlessness or irritability  · Confusion or hallucinations (seeing or hearing things that are not  there)  · Anxiety, depression  · Several days without sleeping  Date Last Reviewed: 11/19/2015  © 0120-4064 [employer]. [address] [place] [address], [place] [address]. All rights reserved. This information is not intended as a substitute for professional medical care. Always follow your healthcare professional's instructions.        Treating Insomnia  Good sleeping habits are a key part of treatment. If needed, some medications may help you sleep better at first. Making healthy lifestyle changes and learning to relax can improve your sleep. Treating insomnia takes commitment, but trust that your efforts will pay off. Talk to your health care provider before taking any medication.    Healthy lifestyle  Your lifestyle affects your health and your sleep. Here are some healthy habits:  · Keep a regular sleep schedule. Go to bed and get up at the same time each day.  · Exercise regularly. It may help you reduce stress. Avoid strenuous exercise for 2 to 4 hours before bedtime.  · Avoid or limit naps, especially in the late afternoon.  · Use your bed only for sleep and sex.  · Dont spend too much time in bed trying to fall asleep. If you cant fall asleep, get up and do something (no electronics) until you become tired and drowsy.  · Avoid or limit caffeine and nicotine for up to 6 hours before bedtime. They can keep you awake at night.   · Also avoid alcohol for at least 4 to 6 hours before bedtime. It may help you fall asleep at first, but you will have more awakenings throughout the night, and your sleep will not be restful.  Before bedtime  To sleep better every night, try these tips:  · Have a bedtime routine to let your body and mind know when its time to sleep.  · Think of going to bed as relaxing and enjoyable. Sleep will come sooner.  · If your worries dont let you sleep, write them down in a diary. Then close it, and go to bed.  · Make sure the room is not too hot or too cold. If its not dark  enough, an eye mask can help. If its noisy, try using earplugs.  Learn to relax  Stress, anxiety, and body tension may keep you awake at night. To unwind before bedtime, try a warm bath, meditation, or yoga. Also, try the following:  · Deep breathing. Sit or lie back in a chair. Take a slow, deep breath. Hold it for 5 counts. Then breathe out slowly through your mouth. Keep doing this until you feel relaxed.  · Progressive muscle relaxation. Tense and then relax the muscles in your body as you breathe deeply. Start with your feet and work up your body to your neck and face.  Date Last Reviewed: 7/18/2015  © 5644-6903 [employer]. [address] [place] [address], [place] [address]. All rights reserved. This information is not intended as a substitute for professional medical care. Always follow your healthcare professional's instructions.        What is Insomnia?  Do you have trouble falling asleep? Do you wake up often during the night? Or, maybe you wake up too early in the morning. You may be suffering from insomnia. Talk to your health care provider if it lasts longer than a few weeks and you feel tired most of the time.    What causes insomnia?  Some common causes of insomnia are:  · Medical problems such as pain, depression, medication side effects, or trouble breathing  · Circadian rhythm disorder, a shift in the bodys normal 24-hour activity cycle  · Lifestyle factors such as a changing sleep schedule, lack of exercise, or too much caffeine  · Sleep settings such as a poor mattress, noise, or a room thats too hot or too cold  · Stress such as problems at work, money worries, or family events  Talk to your health care provider  Describe your sleeping problems to your health care provider. Try to keep a daily sleep diary for a couple weeks. Write down the time you go to bed, the time you wake up, and anything that seems to affect your sleep. Your health care provider can work with you to develop a  treatment plan. You may need to learn good sleeping habits and make some lifestyle changes. If you have any medical problems, these may need to be treated first.  Date Last Reviewed: 7/18/2015  © 7073-9812 [employer]. [address] [place] [address], [place] [address]. All rights reserved. This information is not intended as a substitute for professional medical care. Always follow your healthcare professional's instructions.             Language Assistance Services     ATTENTION: Language assistance services are available, free of charge. Please call [phone].      ATENCIÓN: Si valentinla español, tiene a delgado disposición servicios gratuitos de asistencia lingüística. Llame al [phone].     KAREEN Ý: N?u b?n nói Ti?ng Vi?t, có các d?ch v? h? tr? ngôn ng? mi?n phí dành cho b?n. G?i s? [phone].         [facility] complies with applicable Federal civil rights laws and does not discriminate on the basis of race, color, national origin, age, disability, or sex.

## 2017-05-29 ENCOUNTER — PATIENT MESSAGE (OUTPATIENT)
Dept: INTERNAL MEDICINE | Facility: CLINIC | Age: 52
End: 2017-05-29

## 2017-05-29 DIAGNOSIS — E66.01 MORBID OBESITY WITH BMI OF 40.0-44.9, ADULT: ICD-10-CM

## 2017-06-06 RX ORDER — PHENTERMINE HYDROCHLORIDE 37.5 MG/1
37.5 TABLET ORAL
Qty: 30 TABLET | Refills: 0 | Status: SHIPPED | OUTPATIENT
Start: 2017-06-06 | End: 2017-07-06

## 2017-06-06 NOTE — TELEPHONE ENCOUNTER
She will go. She was under the impression that they were going to contact her. I advised her to call to schedule.

## 2017-06-06 NOTE — TELEPHONE ENCOUNTER
Spoke to pt--she advised that she is doing very well on Adipex. She have lost 5lbs. She only takes it M-F. She have been doing well with her BP---no issues. She would like to know if she can have a refill. I gave her the phone number to bariatric clinic.

## 2017-06-06 NOTE — TELEPHONE ENCOUNTER
Please call to check on how she is doing on the Adipex in terms of weight loss and also blood pressure.  Also, she was supposed to have been seen in the bariatric clinic but no appointment has been scheduled.  Please ask if she needs any assistance with this.  Thank you

## 2017-06-06 NOTE — TELEPHONE ENCOUNTER
Sure, meds refilled today.    If she opts to not go to the bariatric clinic, I would like to see her in about 2 months for follow-up.  Thank you

## 2018-01-01 DIAGNOSIS — E55.9 VITAMIN D DEFICIENCY DISEASE: ICD-10-CM

## 2018-01-01 DIAGNOSIS — I10 ESSENTIAL HYPERTENSION: ICD-10-CM

## 2018-01-02 RX ORDER — TRIAMTERENE AND HYDROCHLOROTHIAZIDE 37.5; 25 MG/1; MG/1
1 CAPSULE ORAL EVERY MORNING
Qty: 30 CAPSULE | Refills: 0 | Status: SHIPPED | OUTPATIENT
Start: 2018-01-02 | End: 2018-06-29

## 2018-01-02 RX ORDER — AMLODIPINE BESYLATE 10 MG/1
TABLET ORAL
Qty: 30 TABLET | Refills: 0 | Status: SHIPPED | OUTPATIENT
Start: 2018-01-02 | End: 2018-06-29 | Stop reason: SDUPTHER

## 2018-01-02 RX ORDER — ERGOCALCIFEROL 1.25 MG/1
CAPSULE ORAL
Qty: 12 CAPSULE | Refills: 0 | Status: SHIPPED | OUTPATIENT
Start: 2018-01-02 | End: 2018-06-29 | Stop reason: SDUPTHER

## 2018-04-26 ENCOUNTER — TELEPHONE (OUTPATIENT)
Dept: INTERNAL MEDICINE | Facility: CLINIC | Age: 53
End: 2018-04-26

## 2018-04-26 DIAGNOSIS — Z12.39 BREAST CANCER SCREENING: Primary | ICD-10-CM

## 2018-04-26 DIAGNOSIS — E11.9 CONTROLLED TYPE 2 DIABETES MELLITUS WITHOUT COMPLICATION, UNSPECIFIED WHETHER LONG TERM INSULIN USE: ICD-10-CM

## 2018-04-26 NOTE — TELEPHONE ENCOUNTER
She is overdue for physical:    Labs, mammogram, colonoscopy, eye exam, urine, podiatry    Can you call her to schedule all?  OK to use [facility]    Please let me know thanks

## 2018-04-27 ENCOUNTER — TELEPHONE (OUTPATIENT)
Dept: ADMINISTRATIVE | Facility: HOSPITAL | Age: 53
End: 2018-04-27

## 2018-04-27 NOTE — TELEPHONE ENCOUNTER
Spoke to pt. Annual w/ PCP, mammo & podiatry scheduled for 5/28/18. Labs and urine micro scheduled for 5/25/18. Pt sees an outside provider for eye care. She says she will bring results from recent exam to PCP appt.

## 2018-05-28 ENCOUNTER — TELEPHONE (OUTPATIENT)
Dept: INTERNAL MEDICINE | Facility: CLINIC | Age: 53
End: 2018-05-28

## 2018-05-28 NOTE — TELEPHONE ENCOUNTER
Please call her    She did not keep her appointment with me today and did not do labs or urine or mammogram or keep her podiatry appointment.    Is she seeing another doctor?    In order for me to continue to prescribe her medication, she will need an appointment with me.  Please let her know.

## 2018-05-29 NOTE — TELEPHONE ENCOUNTER
----- Message from Christiano Valdez sent at 5/29/2018  1:06 PM CDT -----  Contact: Patient [phone]  Patient is stating is returning a call back from the  Office.  Patient is requesting a call to schedule an appt Annual Physical for 06/29/18    Please call and advise  Thank you

## 2018-06-22 ENCOUNTER — PATIENT MESSAGE (OUTPATIENT)
Dept: INTERNAL MEDICINE | Facility: CLINIC | Age: 53
End: 2018-06-22

## 2018-06-22 ENCOUNTER — LAB VISIT (OUTPATIENT)
Dept: LAB | Facility: HOSPITAL | Age: 53
End: 2018-06-22
Attending: INTERNAL MEDICINE
Payer: COMMERCIAL

## 2018-06-22 DIAGNOSIS — E55.9 VITAMIN D DEFICIENCY DISEASE: ICD-10-CM

## 2018-06-22 DIAGNOSIS — E11.8 TYPE 2 DIABETES MELLITUS WITH COMPLICATION, WITHOUT LONG-TERM CURRENT USE OF INSULIN: ICD-10-CM

## 2018-06-22 DIAGNOSIS — E78.2 MIXED HYPERLIPIDEMIA: ICD-10-CM

## 2018-06-22 LAB
25(OH)D3+25(OH)D2 SERPL-MCNC: 23 NG/ML
ALBUMIN SERPL BCP-MCNC: 3.6 G/DL
ALP SERPL-CCNC: 93 U/L
ALT SERPL W/O P-5'-P-CCNC: 20 U/L
ANION GAP SERPL CALC-SCNC: 9 MMOL/L
AST SERPL-CCNC: 19 U/L
BILIRUB SERPL-MCNC: 0.4 MG/DL
BUN SERPL-MCNC: 13 MG/DL
CALCIUM SERPL-MCNC: 9.6 MG/DL
CHLORIDE SERPL-SCNC: 103 MMOL/L
CHOLEST SERPL-MCNC: 185 MG/DL
CHOLEST/HDLC SERPL: 4 {RATIO}
CO2 SERPL-SCNC: 30 MMOL/L
CREAT SERPL-MCNC: 0.7 MG/DL
EST. GFR  (AFRICAN AMERICAN): >60 ML/MIN/1.73 M^2
EST. GFR  (NON AFRICAN AMERICAN): >60 ML/MIN/1.73 M^2
ESTIMATED AVG GLUCOSE: 134 MG/DL
GLUCOSE SERPL-MCNC: 120 MG/DL
HBA1C MFR BLD HPLC: 6.3 %
HDLC SERPL-MCNC: 46 MG/DL
HDLC SERPL: 24.9 %
LDLC SERPL CALC-MCNC: 123.4 MG/DL
NONHDLC SERPL-MCNC: 139 MG/DL
POTASSIUM SERPL-SCNC: 4.2 MMOL/L
PROT SERPL-MCNC: 8.5 G/DL
SODIUM SERPL-SCNC: 142 MMOL/L
TRIGL SERPL-MCNC: 78 MG/DL

## 2018-06-22 PROCEDURE — 80061 LIPID PANEL: CPT

## 2018-06-22 PROCEDURE — 82306 VITAMIN D 25 HYDROXY: CPT

## 2018-06-22 PROCEDURE — 80053 COMPREHEN METABOLIC PANEL: CPT

## 2018-06-22 PROCEDURE — 36415 COLL VENOUS BLD VENIPUNCTURE: CPT | Mod: PO

## 2018-06-22 PROCEDURE — 83036 HEMOGLOBIN GLYCOSYLATED A1C: CPT

## 2018-06-29 ENCOUNTER — OFFICE VISIT (OUTPATIENT)
Dept: INTERNAL MEDICINE | Facility: CLINIC | Age: 53
End: 2018-06-29
Payer: COMMERCIAL

## 2018-06-29 ENCOUNTER — TELEPHONE (OUTPATIENT)
Dept: INTERNAL MEDICINE | Facility: CLINIC | Age: 53
End: 2018-06-29

## 2018-06-29 VITALS
BODY MASS INDEX: 40.44 KG/M2 | SYSTOLIC BLOOD PRESSURE: 145 MMHG | DIASTOLIC BLOOD PRESSURE: 90 MMHG | WEIGHT: 200.63 LBS | HEIGHT: 59 IN

## 2018-06-29 DIAGNOSIS — Z12.31 SCREENING MAMMOGRAM, ENCOUNTER FOR: ICD-10-CM

## 2018-06-29 DIAGNOSIS — Z00.00 ANNUAL PHYSICAL EXAM: Primary | ICD-10-CM

## 2018-06-29 DIAGNOSIS — E78.2 MIXED HYPERLIPIDEMIA: ICD-10-CM

## 2018-06-29 DIAGNOSIS — I10 ESSENTIAL HYPERTENSION: ICD-10-CM

## 2018-06-29 DIAGNOSIS — E55.9 VITAMIN D DEFICIENCY DISEASE: ICD-10-CM

## 2018-06-29 DIAGNOSIS — E11.29 TYPE 2 DIABETES MELLITUS WITH MICROALBUMINURIA, WITHOUT LONG-TERM CURRENT USE OF INSULIN: ICD-10-CM

## 2018-06-29 DIAGNOSIS — Z12.11 COLON CANCER SCREENING: ICD-10-CM

## 2018-06-29 DIAGNOSIS — R80.9 TYPE 2 DIABETES MELLITUS WITH MICROALBUMINURIA, WITHOUT LONG-TERM CURRENT USE OF INSULIN: ICD-10-CM

## 2018-06-29 DIAGNOSIS — E11.8 TYPE 2 DIABETES MELLITUS WITH COMPLICATION, WITHOUT LONG-TERM CURRENT USE OF INSULIN: ICD-10-CM

## 2018-06-29 DIAGNOSIS — E66.01 MORBID OBESITY WITH BMI OF 40.0-44.9, ADULT: ICD-10-CM

## 2018-06-29 PROCEDURE — 99999 PR PBB SHADOW E&M-EST. PATIENT-LVL IV: CPT | Mod: PBBFAC,,, | Performed by: INTERNAL MEDICINE

## 2018-06-29 PROCEDURE — 3077F SYST BP >= 140 MM HG: CPT | Mod: CPTII,S$GLB,, | Performed by: INTERNAL MEDICINE

## 2018-06-29 PROCEDURE — 3080F DIAST BP >= 90 MM HG: CPT | Mod: CPTII,S$GLB,, | Performed by: INTERNAL MEDICINE

## 2018-06-29 PROCEDURE — 99396 PREV VISIT EST AGE 40-64: CPT | Mod: S$GLB,,, | Performed by: INTERNAL MEDICINE

## 2018-06-29 PROCEDURE — 3044F HG A1C LEVEL LT 7.0%: CPT | Mod: CPTII,S$GLB,, | Performed by: INTERNAL MEDICINE

## 2018-06-29 RX ORDER — ATORVASTATIN CALCIUM 20 MG/1
20 TABLET, FILM COATED ORAL DAILY
Qty: 90 TABLET | Refills: 3 | Status: SHIPPED | OUTPATIENT
Start: 2018-06-29 | End: 2018-09-07 | Stop reason: SDUPTHER

## 2018-06-29 RX ORDER — PHENTERMINE HYDROCHLORIDE 37.5 MG/1
37.5 TABLET ORAL
Qty: 30 TABLET | Refills: 0 | Status: SHIPPED | OUTPATIENT
Start: 2018-06-29 | End: 2018-07-27 | Stop reason: SDUPTHER

## 2018-06-29 RX ORDER — LOSARTAN POTASSIUM 50 MG/1
50 TABLET ORAL DAILY
Qty: 90 TABLET | Refills: 3 | Status: SHIPPED | OUTPATIENT
Start: 2018-06-29 | End: 2018-07-27 | Stop reason: SDUPTHER

## 2018-06-29 RX ORDER — AMLODIPINE BESYLATE 5 MG/1
5 TABLET ORAL DAILY
Qty: 90 TABLET | Refills: 3 | Status: SHIPPED | OUTPATIENT
Start: 2018-06-29 | End: 2018-08-27 | Stop reason: SDUPTHER

## 2018-06-29 RX ORDER — ERGOCALCIFEROL 1.25 MG/1
50000 CAPSULE ORAL
Qty: 12 CAPSULE | Refills: 3 | Status: SHIPPED | OUTPATIENT
Start: 2018-06-29 | End: 2018-08-27 | Stop reason: SDUPTHER

## 2018-06-29 RX ORDER — ASPIRIN 81 MG/1
81 TABLET ORAL DAILY
Qty: 30 TABLET | Refills: 12
Start: 2018-06-29 | End: 2022-04-22

## 2018-06-29 NOTE — PATIENT INSTRUCTIONS
Obstructive Sleep Apnea  Obstructive sleep apnea is a condition that causes your air passages to become narrowed or blocked during sleep. As a result, breathing stops for short periods. Your body wakes up enough for breathing to begin again, though you don't remember it. The cycle of stopped breathing and brief awakenings can repeat dozens of times a night. This prevents the body from getting to the deeper stages of sleep that are needed for good rest and may cause your body's oxygen level to fall.  Signs of sleep apnea include loud snoring, noisy breathing, and gasping sounds during sleep. Daytime symptoms include waking up tired after a full night's sleep, waking up with headaches, feeling very sleepy or falling asleep during the day, and having problems with memory or concentration.  Risk factors for sleep apnea include:  · Being overweight  · Being a man, or a woman in menopause  · Smoking  · Using alcohol or sedating medicines  · Having enlarged structures in the nose or throat  Home care  Lifestyle changes that can help treat snoring and sleep apnea include the following:  · If you are overweight, lose weight. Talk to your healthcare provider about a weight-loss plan for you.  · Avoid alcohol for 3 to 4 hours before bedtime. Avoid sedating medications. Ask your healthcare provider about the medicines you take.  · If you smoke, talk to your healthcare provider about ways to quit.  · Sleep on your side. This can help prevent gravity from pulling relaxed throat tissues into your breathing passages.  · If you have allergies or sinus problems that block your nose, ask your healthcare provider for help.  Follow-up care  Follow up with your healthcare provider, or as advised. A diagnosis of sleep apnea is made with a sleep study. Your healthcare provider can tell you more about this test.  When to seek medical advice  Sleep apnea can make you more likely to have certain health problems. These include high blood  pressure, heart attack, stroke, and sexual dysfunction. If you have sleep apnea, talk to your healthcare provider about the best treatments for you.  Date Last Reviewed: 4/1/2017 © 2000-2017 [employer]. [address] [place] [address], [place] [address]. All rights reserved. This information is not intended as a substitute for professional medical care. Always follow your healthcare professional's instructions.        What Are Snoring and Obstructive Sleep Apnea?  If youve ever had a stuffed-up nose, you know the feeling of trying to breathe through a very narrow passageway. This is what happens in your throat when you snore. While you sleep, structures in your throat partially block your air passage, making the passage narrow and hard to breathe through. If the entire passage becomes blocked and you cant breathe at all, you have sleep apnea.      Snoring Obstructive sleep apnea   Snoring  If your throat structures are too large or the muscles relax too much during sleep, the air passage may be partially blocked. As air from the nose or mouth passes around this blockage, the throat structures vibrate, causing the familiar sound of snoring. At times, this sound can be so loud that snorers wake up others, or even themselves, during the night. Snoring gets worse as more and more of the air passage is blocked.  Obstructive sleep apnea  If the structures completely block the throat, air cant flow to the lungs at all. This is called apnea (meaning no breathing). Since the lungs arent getting fresh air, the brain tells the body to wake up just enough to tighten the muscles and unblock the air passage. With a loud gasp, breathing begins again. This process may be repeated over and over again throughout the night, making your sleep fragmented with a lighter stage of sleep. Even though you do not remember waking up many times during the night to a lighter sleep, you feel tired the next day. The lack of sleep and  fresh air can also strain your lungs, heart, and other organs, leading to problems such as high blood pressure, heart attack, or stroke.  Problems in the nose and jaw  Problems in the structure of the nose may obstruct breathing. A crooked (deviated) septum or swollen turbinates can make snoring worse or lead to apnea. Also, a receding jaw may make the tongue sit too far back, so its more likely to block the airway when youre asleep.        Date Last Reviewed: 7/18/2015 © 2000-2017 [employer]. [address] [place] [address]. All rights reserved. This information is not intended as a substitute for professional medical care. Always follow your healthcare professional's instructions.        Snoring and Sleep Apnea: Notes for a Partner    Snoring and sleep apnea affect your life, as well as your partners. You can help in the treatment of the problem. Be supportive. Encourage your partner both to get treatment and to make the adjustments needed to follow through.  Adjusting to changes  Your partners treatment may involve making changes to certain life habits. You can help your partner make and stick with these changes. For example:  · Support and even join your partners exercise program.  · Be supportive if your partner gets CPAP (continuous positive airway pressure). He or she may feel self-conscious at first. Remind your partner to expect adjustments to CPAP before it feels just right.  · Consider joining a snoring and sleep apnea support group.  Go along to see the healthcare provider  You can give the healthcare provider the best account of your partners nighttime breathing and snoring patterns. Try to go along to healthcare providers appointments. If you cant go, write notes for your partner to give to the healthcare provider. Describe your partners snoring and sleep breathing patterns in detail.  Tips for sleeping with a snorer  Until treatment takes care of your partners  snoring:  · Try to go to bed first. It may help if youre already asleep when your partner starts to snore.  · Wear earplugs to bed. A fan or other source of background noise may also help drown out snoring.   Date Last Reviewed: 8/10/2015  © 4813-5252 [employer]. [address] [place] [address], [place] [id number]. All rights reserved. This information is not intended as a substitute for professional medical care. Always follow your healthcare professional's instructions.

## 2018-06-29 NOTE — TELEPHONE ENCOUNTER
Eye exam at Dr Mario Guallpa some time n past 2 months    Can you please track down and document in health maintenance?  Thank you

## 2018-06-29 NOTE — PROGRESS NOTES
Subjective:       Patient ID: Amanda henry is a 52 y.o. female.    Chief Complaint: Annual Exam    Annual exam    DM:  A1c 6.3 on no meds.  Not really exercising much, has gained weight.  Hopes to resume this.    Weight discussed.  She occasionally snores but does not stop breathing.  She is not especially tired.  CARLOS was reviewed.  She has done well with Adipex.  She is planning to start exercising.  Will prescribe again for a few months, cautions and side effects reviewed.    HTN:  BP is high.  She has been out of her medications per month.  Will resume amlodipine at 5 mg and start losartan due to micro albuminuria.  This was reviewed at length.  Low-sodium diet and exercise again also discussed.    She had an eye exam with Dr Cristiano Guallpa's office in the past month all OK.    She is due for mammogram which she will schedule.  She is also due for colonoscopy.    Patient Active Problem List:     Mixed hyperlipidemia     Vitamin D deficiency disease     Essential hypertension     Type 2 diabetes mellitus with microalbuminuria, without long-term current use of insulin     Morbid obesity with BMI of 40.0-44.9, adult                Review of Systems   Constitutional: Negative for activity change, appetite change, chills, fatigue and fever.   HENT: Negative for congestion, hearing loss, sinus pressure and sore throat.    Eyes: Negative for visual disturbance.   Respiratory: Negative for apnea, cough, shortness of breath and wheezing.    Cardiovascular: Negative for chest pain, palpitations and leg swelling.   Gastrointestinal: Negative for abdominal distention, abdominal pain, constipation, diarrhea, nausea and vomiting.   Genitourinary: Negative for dysuria, frequency, hematuria and vaginal bleeding.   Musculoskeletal: Negative for gait problem, joint swelling and myalgias.   Skin: Negative for rash.   Neurological: Negative for dizziness, weakness, light-headedness and headaches.   Hematological: Negative for  adenopathy. Does not bruise/bleed easily.   Psychiatric/Behavioral: Negative for confusion, hallucinations, sleep disturbance and suicidal ideas.       Objective:      Physical Exam   Constitutional: She is oriented to person, place, and time. She appears well-developed and well-nourished.   HENT:   Head: Normocephalic and atraumatic.   Right Ear: External ear normal.   Left Ear: External ear normal.   Nose: Nose normal.   Mouth/Throat: Oropharynx is clear and moist. No oropharyngeal exudate.   Eyes: Conjunctivae and EOM are normal. No scleral icterus.   Neck: Normal range of motion. Neck supple. No JVD present. No thyromegaly present.   Cardiovascular: Normal rate, regular rhythm, normal heart sounds and intact distal pulses.  Exam reveals no gallop.    No murmur heard.  Pulses:       Dorsalis pedis pulses are 2+ on the right side, and 2+ on the left side.        Posterior tibial pulses are 2+ on the right side, and 2+ on the left side.   Pulmonary/Chest: Effort normal and breath sounds normal. No respiratory distress. She has no wheezes.   Abdominal: Soft. Bowel sounds are normal. She exhibits no distension and no mass. There is no tenderness. There is no rebound and no guarding.   Musculoskeletal: Normal range of motion. She exhibits no edema or tenderness.        Right foot: There is normal range of motion and no deformity.        Left foot: There is normal range of motion and no deformity.   Feet:   Right Foot:   Protective Sensation: 6 sites tested. 6 sites sensed.   Skin Integrity: Negative for ulcer, blister, skin breakdown or erythema.   Left Foot:   Protective Sensation: 6 sites tested. 6 sites sensed.   Skin Integrity: Negative for ulcer, blister, skin breakdown or erythema.   Lymphadenopathy:     She has no cervical adenopathy.   Neurological: She is alert and oriented to person, place, and time. She displays normal reflexes. No cranial nerve deficit. Coordination normal.   Skin: Skin is warm. No rash  noted. No erythema.   Psychiatric: She has a normal mood and affect. Her behavior is normal. Judgment and thought content normal.   Nursing note and vitals reviewed.      Assessment:       1. Annual physical exam    2. Essential hypertension    3. Mixed hyperlipidemia    4. Vitamin D deficiency disease    5. Morbid obesity with BMI of 40.0-44.9, adult    6. Type 2 diabetes mellitus with complication, without long-term current use of insulin    7. Screening mammogram, encounter for    8. Colon cancer screening    9. Type 2 diabetes mellitus with microalbuminuria, without long-term current use of insulin        Plan:         Annual physical exam    Essential hypertension:  Compliance with meds urged.  Low salt diet, exercise, 5-10-# weight loss in next 6-12 months; time.  Call if BP > 130/80 on a regular basis.  -     amLODIPine (NORVASC) 5 MG tablet; Take 1 tablet (5 mg total) by mouth once daily.  Dispense: 90 tablet; Refill: 3    Mixed hyperlipidemia: targets reviewed.  She has been off her medication  -     atorvastatin (LIPITOR) 20 MG tablet; Take 1 tablet (20 mg total) by mouth once daily.  Dispense: 90 tablet; Refill: 3  -     Lipid panel; Future; Expected date: 10/27/2018  -     Comprehensive metabolic panel; Future; Expected date: 10/27/2018    Vitamin D deficiency disease  -     ergocalciferol (ERGOCALCIFEROL) 50,000 unit Cap; Take 1 capsule (50,000 Units total) by mouth every 7 days.  Dispense: 12 capsule; Refill: 3  -     Vitamin D; Future; Expected date: 10/27/2018    Morbid obesity with BMI of 40.0-44.9, adult:  Issues reviewed.  Exercise, portion control, lifestyle modification.  Consider bariatric follow-up.  Will prescribe Adipex for total of 3-4 months, cautions and side effects reviewed.  Return in 1 month for BP check in reassessment; return in 3 months with me.  I have asked her to message me within the next month with her progress.   Sleep apnea issues discussed at length, she denies most  symptoms other than minimal snoring but may need to reconsider assessment of this in the near future should weight loss continue to be problematic.    Type 2 diabetes mellitus with microalbuminuria  -     Hemoglobin A1c; Future; Expected date: 10/27/2018  -     Microalbumin/creatinine urine ratio; Future; Expected date: 09/27/2018              Screening mammogram, encounter for  -     Mammo Digital Screening Bilat with Tomosynthesis CAD; Future; Expected date: 06/29/2018    Colon cancer screening  -     Case request GI: COLONOSCOPY    Type 2 diabetes mellitus with microalbuminuria, without long-term current use of insulin:  Start losartan for microalbumin.  Portion controlled high-fiber low-carbohydrate eating plan, regular vigorous exercise.  Attempt to lose 10-15 lb in the next 3 months.  Has had eye exam done elsewhere    Other orders  -     aspirin (ECOTRIN) 81 MG EC tablet; Take 1 tablet (81 mg total) by mouth once daily.  Dispense: 30 tablet; Refill: 12  -     losartan (COZAAR) 50 MG tablet; Take 1 tablet (50 mg total) by mouth once daily.  Dispense: 90 tablet; Refill: 3  -     phentermine (ADIPEX-P) 37.5 mg tablet; Take 1 tablet (37.5 mg total) by mouth before breakfast.  Dispense: 30 tablet; Refill: 0    I will review all studies and determine further tx depending on findings

## 2018-07-06 ENCOUNTER — HOSPITAL ENCOUNTER (OUTPATIENT)
Dept: RADIOLOGY | Facility: HOSPITAL | Age: 53
Discharge: HOME | End: 2018-07-06
Attending: INTERNAL MEDICINE
Payer: COMMERCIAL

## 2018-07-06 DIAGNOSIS — Z12.31 SCREENING MAMMOGRAM, ENCOUNTER FOR: ICD-10-CM

## 2018-07-06 PROCEDURE — 77063 BREAST TOMOSYNTHESIS BI: CPT | Mod: TC,PO

## 2018-07-06 PROCEDURE — 77063 BREAST TOMOSYNTHESIS BI: CPT | Mod: 26,,, | Performed by: RADIOLOGY

## 2018-07-06 PROCEDURE — 77067 SCR MAMMO BI INCL CAD: CPT | Mod: 26,,, | Performed by: RADIOLOGY

## 2018-07-09 ENCOUNTER — PATIENT MESSAGE (OUTPATIENT)
Dept: INTERNAL MEDICINE | Facility: CLINIC | Age: 53
End: 2018-07-09

## 2018-07-27 ENCOUNTER — OFFICE VISIT (OUTPATIENT)
Dept: INTERNAL MEDICINE | Facility: CLINIC | Age: 53
End: 2018-07-27
Payer: COMMERCIAL

## 2018-07-27 VITALS
BODY MASS INDEX: 38.09 KG/M2 | SYSTOLIC BLOOD PRESSURE: 144 MMHG | HEART RATE: 78 BPM | HEIGHT: 60 IN | WEIGHT: 194 LBS | DIASTOLIC BLOOD PRESSURE: 100 MMHG

## 2018-07-27 DIAGNOSIS — I10 ESSENTIAL HYPERTENSION: ICD-10-CM

## 2018-07-27 DIAGNOSIS — E78.2 MIXED HYPERLIPIDEMIA: ICD-10-CM

## 2018-07-27 DIAGNOSIS — E66.01 MORBID OBESITY WITH BMI OF 40.0-44.9, ADULT: ICD-10-CM

## 2018-07-27 DIAGNOSIS — I10 ESSENTIAL HYPERTENSION: Primary | ICD-10-CM

## 2018-07-27 PROCEDURE — 3077F SYST BP >= 140 MM HG: CPT | Mod: CPTII,S$GLB,, | Performed by: NURSE PRACTITIONER

## 2018-07-27 PROCEDURE — 3008F BODY MASS INDEX DOCD: CPT | Mod: CPTII,S$GLB,, | Performed by: NURSE PRACTITIONER

## 2018-07-27 PROCEDURE — 99213 OFFICE O/P EST LOW 20 MIN: CPT | Mod: S$GLB,,, | Performed by: NURSE PRACTITIONER

## 2018-07-27 PROCEDURE — 3078F DIAST BP <80 MM HG: CPT | Mod: CPTII,S$GLB,, | Performed by: NURSE PRACTITIONER

## 2018-07-27 PROCEDURE — 99999 PR PBB SHADOW E&M-EST. PATIENT-LVL III: CPT | Mod: PBBFAC,,, | Performed by: NURSE PRACTITIONER

## 2018-07-27 RX ORDER — LOSARTAN POTASSIUM 100 MG/1
100 TABLET ORAL DAILY
Qty: 90 TABLET | Refills: 3 | Status: SHIPPED | OUTPATIENT
Start: 2018-07-27 | End: 2018-10-29 | Stop reason: SDUPTHER

## 2018-07-27 RX ORDER — PHENTERMINE HYDROCHLORIDE 37.5 MG/1
37.5 TABLET ORAL
Qty: 30 TABLET | Refills: 0 | Status: SHIPPED | OUTPATIENT
Start: 2018-07-27 | End: 2018-08-26

## 2018-07-27 RX ORDER — LOSARTAN POTASSIUM 100 MG/1
100 TABLET ORAL DAILY
Qty: 90 TABLET | Refills: 3 | Status: SHIPPED | OUTPATIENT
Start: 2018-07-27 | End: 2018-07-27 | Stop reason: SDUPTHER

## 2018-07-27 NOTE — PROGRESS NOTES
INTERNAL MEDICINE URGENT CARE NOTE    CHIEF COMPLAINT     BP Check     HPI     Amanda Ponce is a 52 y.o. female with htn, hld, vit d def, DM, and obesity who presents for an urgent visit today.  She is an established pt of Dr Sullivan.     Here for BP check. Compliant with losartan 50mg daily and amlodipine 5 mg daily   Has lost 6 lbs in the last month using Adipex. Does not check BP at home.         Past Medical History:  Past Medical History:   Diagnosis Date    Obesity, Class II, BMI 35-39.9, with comorbidity 12/19/2016    Vitamin D deficiency disease 3/24/2015       Home Medications:  Prior to Admission medications    Medication Sig Start Date End Date Taking? Authorizing Provider   amLODIPine (NORVASC) 5 MG tablet Take 1 tablet (5 mg total) by mouth once daily. 6/29/18  Yes Malini Sullivan MD   aspirin (ECOTRIN) 81 MG EC tablet Take 1 tablet (81 mg total) by mouth once daily. 6/29/18 6/29/19 Yes Malini Sullivan MD   atorvastatin (LIPITOR) 20 MG tablet Take 1 tablet (20 mg total) by mouth once daily. 6/29/18 7/29/18 Yes Malini Sullivan MD   blood sugar diagnostic (ONE TOUCH ULTRA TEST) Strp 1 each by Misc.(Non-Drug; Combo Route) route daily as needed. 8/23/12  Yes Mehnaz Quinones NP   ergocalciferol (ERGOCALCIFEROL) 50,000 unit Cap Take 1 capsule (50,000 Units total) by mouth every 7 days. 6/29/18  Yes Malini Sullivan MD   lancets Misc 1 each by Misc.(Non-Drug; Combo Route) route once daily. 8/23/12  Yes Mehnaz Quinones NP   losartan (COZAAR) 50 MG tablet Take 1 tablet (50 mg total) by mouth once daily. 6/29/18 6/29/19 Yes Malini Sullivan MD   phentermine (ADIPEX-P) 37.5 mg tablet Take 1 tablet (37.5 mg total) by mouth before breakfast. 6/29/18 7/29/18 Yes Malini Sullivan MD       Review of Systems:  Review of Systems   Constitutional: Negative for chills and fever.   HENT: Negative for congestion, rhinorrhea, sinus pressure and sore throat.    Eyes: Negative for visual disturbance.   Respiratory:  Negative for cough, shortness of breath and wheezing.    Cardiovascular: Negative for chest pain, palpitations and leg swelling.   Gastrointestinal: Negative for abdominal pain, constipation, diarrhea, nausea and vomiting.   Genitourinary: Negative for dysuria, frequency and urgency.   Musculoskeletal: Negative for joint swelling and myalgias.   Skin: Negative for rash.   Neurological: Negative for dizziness, light-headedness and headaches.   Psychiatric/Behavioral: Negative for sleep disturbance and suicidal ideas.       Health Maintainence:   Immunizations:  Health Maintenance       Date Due Completion Date    TETANUS VACCINE 09/15/1984 ---    Colonoscopy 09/15/2016 ---    Eye Exam 12/14/2017 12/14/2016 (Done)    Override on 12/14/2016: Done (Dr Ruiz)    Override on 7/1/2015: Done    Override on 12/6/2012: Done    Override on 6/6/2012: Done    Influenza Vaccine 08/01/2018 12/19/2016    Override on 3/23/2015: Not Clinically Appropriate    Override on 11/9/2012: Done    Hemoglobin A1c 12/22/2018 6/22/2018    Override on 9/22/2016: Done    Override on 2/16/2016: Done    Override on 3/23/2015: Done    Lipid Panel 06/22/2019 6/22/2018    Override on 3/23/2015: Done    Low Dose Statin 06/29/2019 6/29/2018    Foot Exam 06/29/2019 6/29/2018    Override on 1/5/2017: Done    Override on 12/19/2016: Done    Override on 2/16/2016: Done    Override on 3/23/2015: Done    Mammogram 07/06/2019 7/6/2018    Pneumococcal PPSV23 (Medium Risk) (2) 09/15/2031 8/23/2012           PHYSICAL EXAM     BP (!) 145/85 (BP Location: Left arm, Patient Position: Sitting, BP Method: Medium (Manual))   Pulse 78   Ht 5' (1.524 m)   Wt 88 kg (194 lb 0.1 oz)   BMI 37.89 kg/m²     Physical Exam   Constitutional: She is oriented to person, place, and time. She appears well-developed and well-nourished.   HENT:   Head: Normocephalic and atraumatic.   Eyes: Pupils are equal, round, and reactive to light.   Cardiovascular: Normal rate and  regular rhythm.    Pulmonary/Chest: Effort normal.   Neurological: She is alert and oriented to person, place, and time.   Psychiatric: She has a normal mood and affect.       LABS     Lab Results   Component Value Date    HGBA1C 6.3 (H) 06/22/2018     CMP  Sodium   Date Value Ref Range Status   06/22/2018 142 136 - 145 mmol/L Final     Potassium   Date Value Ref Range Status   06/22/2018 4.2 3.5 - 5.1 mmol/L Final     Chloride   Date Value Ref Range Status   06/22/2018 103 95 - 110 mmol/L Final     CO2   Date Value Ref Range Status   06/22/2018 30 (H) 23 - 29 mmol/L Final     Glucose   Date Value Ref Range Status   06/22/2018 120 (H) 70 - 110 mg/dL Final     BUN, Bld   Date Value Ref Range Status   06/22/2018 13 6 - 20 mg/dL Final     Creatinine   Date Value Ref Range Status   06/22/2018 0.7 0.5 - 1.4 mg/dL Final     Calcium   Date Value Ref Range Status   06/22/2018 9.6 8.7 - 10.5 mg/dL Final     Total Protein   Date Value Ref Range Status   06/22/2018 8.5 (H) 6.0 - 8.4 g/dL Final     Albumin   Date Value Ref Range Status   06/22/2018 3.6 3.5 - 5.2 g/dL Final     Total Bilirubin   Date Value Ref Range Status   06/22/2018 0.4 0.1 - 1.0 mg/dL Final     Comment:     For infants and newborns, interpretation of results should be based  on gestational age, weight and in agreement with clinical  observations.  Premature Infant recommended reference ranges:  Up to 24 hours.............<8.0 mg/dL  Up to 48 hours............<12.0 mg/dL  3-5 days..................<15.0 mg/dL  6-29 days.................<15.0 mg/dL       Alkaline Phosphatase   Date Value Ref Range Status   06/22/2018 93 55 - 135 U/L Final     AST   Date Value Ref Range Status   06/22/2018 19 10 - 40 U/L Final     ALT   Date Value Ref Range Status   06/22/2018 20 10 - 44 U/L Final     Anion Gap   Date Value Ref Range Status   06/22/2018 9 8 - 16 mmol/L Final     eGFR if    Date Value Ref Range Status   06/22/2018 >60.0 >60 mL/min/1.73 m^2 Final      eGFR if non    Date Value Ref Range Status   06/22/2018 >60.0 >60 mL/min/1.73 m^2 Final     Comment:     Calculation used to obtain the estimated glomerular filtration  rate (eGFR) is the CKD-EPI equation.        Lab Results   Component Value Date    WBC 5.26 02/16/2016    HGB 12.9 02/16/2016    HCT 39.3 02/16/2016    MCV 87 02/16/2016     (H) 02/16/2016     Lab Results   Component Value Date    CHOL 185 06/22/2018    CHOL 192 04/12/2017    CHOL 202 (H) 09/22/2016     Lab Results   Component Value Date    HDL 46 06/22/2018    HDL 49 04/12/2017    HDL 50 09/22/2016     Lab Results   Component Value Date    LDLCALC 123.4 06/22/2018    LDLCALC 101.2 04/12/2017    LDLCALC 128.2 09/22/2016     Lab Results   Component Value Date    TRIG 78 06/22/2018    TRIG 209 (H) 04/12/2017    TRIG 119 09/22/2016     Lab Results   Component Value Date    CHOLHDL 24.9 06/22/2018    CHOLHDL 25.5 04/12/2017    CHOLHDL 24.8 09/22/2016     Lab Results   Component Value Date    TSH 2.829 03/23/2015       ASSESSMENT/PLAN     Amanda Ponce is a 52 y.o. female with  Past Medical History:   Diagnosis Date    Obesity, Class II, BMI 35-39.9, with comorbidity 12/19/2016    Vitamin D deficiency disease 3/24/2015     Essential hypertension- will increase Losartan to 100mg daily and cont amlodipine 5 mg. Low na diet. Cont weight loss.   -     losartan (COZAAR) 100 MG tablet; Take 1 tablet (100 mg total) by mouth once daily.  Dispense: 90 tablet; Refill: 3    Morbid obesity with BMI of 40.0-44.9, adult- 6 lb weight loss. Congratulated weight loss and continue weight loss.   -     phentermine (ADIPEX-P) 37.5 mg tablet; Take 1 tablet (37.5 mg total) by mouth before breakfast.  Dispense: 30 tablet; Refill: 0      Follow up for nurse visit BP check in 4-6 weeks.     Patient education provided from Chris. Patient was counseled on when and how to seek emergent care.       La RAMON, APRN, FNP-c   Department of  Internal Medicine - [facility]  7:40 AM

## 2018-07-27 NOTE — TELEPHONE ENCOUNTER
----- Message from Casi Charles sent at 7/27/2018 11:13 AM CDT -----  Contact: self/[phone]  Pt is calling to get her medication losartan (COZAAR) 100 MG tablet changed to her local [facility]. Pt states that she is at the pharmacy right now and needs it sent to [facility] [address] [place] [address] ([place] - [address] [facility]. Please advise.      Thanks

## 2018-08-06 DIAGNOSIS — E11.9 TYPE 2 DIABETES MELLITUS WITHOUT COMPLICATION, UNSPECIFIED WHETHER LONG TERM INSULIN USE: ICD-10-CM

## 2018-08-20 ENCOUNTER — CLINICAL SUPPORT (OUTPATIENT)
Dept: INTERNAL MEDICINE | Facility: CLINIC | Age: 53
End: 2018-08-20
Payer: COMMERCIAL

## 2018-08-20 ENCOUNTER — TELEPHONE (OUTPATIENT)
Dept: INTERNAL MEDICINE | Facility: CLINIC | Age: 53
End: 2018-08-20

## 2018-08-20 VITALS
SYSTOLIC BLOOD PRESSURE: 152 MMHG | HEIGHT: 59 IN | DIASTOLIC BLOOD PRESSURE: 90 MMHG | WEIGHT: 191.38 LBS | BODY MASS INDEX: 38.58 KG/M2 | HEART RATE: 102 BPM

## 2018-08-20 PROCEDURE — 99999 PR PBB SHADOW E&M-EST. PATIENT-LVL II: CPT | Mod: PBBFAC,,,

## 2018-08-20 NOTE — TELEPHONE ENCOUNTER
Pt here for a BP check (Dr. Sullivan) ---- Pt taking Losartan 100mg once daily and Amlodipine 5mg daily . Pt denied any symptoms. Pt did not take medications yet today because usually take with food. BP was taken manually in right arm reading was 152/90. I advised pt that she needed to take her medication and she agreed.

## 2018-08-20 NOTE — TELEPHONE ENCOUNTER
Please schedule her to come back in 1 week for blood pressure check on the day that she has actually taken her medication.    She should never come in for blood pressure check unless she has actually taken her medicine.  She can take her medication even if she has not eaten.  Please let me know when scheduled.  Thank you

## 2018-08-27 ENCOUNTER — TELEPHONE (OUTPATIENT)
Dept: INTERNAL MEDICINE | Facility: CLINIC | Age: 53
End: 2018-08-27

## 2018-08-27 ENCOUNTER — CLINICAL SUPPORT (OUTPATIENT)
Dept: INTERNAL MEDICINE | Facility: CLINIC | Age: 53
End: 2018-08-27
Payer: COMMERCIAL

## 2018-08-27 VITALS — DIASTOLIC BLOOD PRESSURE: 110 MMHG | HEART RATE: 90 BPM | SYSTOLIC BLOOD PRESSURE: 164 MMHG

## 2018-08-27 DIAGNOSIS — E55.9 VITAMIN D DEFICIENCY DISEASE: ICD-10-CM

## 2018-08-27 DIAGNOSIS — I10 ESSENTIAL HYPERTENSION: ICD-10-CM

## 2018-08-27 PROCEDURE — 99999 PR PBB SHADOW E&M-EST. PATIENT-LVL I: CPT | Mod: PBBFAC,,,

## 2018-08-27 RX ORDER — AMLODIPINE BESYLATE 5 MG/1
5 TABLET ORAL 2 TIMES DAILY
Qty: 180 TABLET | Refills: 3 | Status: SHIPPED | OUTPATIENT
Start: 2018-08-27 | End: 2018-09-07 | Stop reason: SDUPTHER

## 2018-08-27 RX ORDER — ERGOCALCIFEROL 1.25 MG/1
50000 CAPSULE ORAL
Qty: 12 CAPSULE | Refills: 3 | Status: SHIPPED | OUTPATIENT
Start: 2018-08-27 | End: 2018-10-29 | Stop reason: SDUPTHER

## 2018-08-27 NOTE — TELEPHONE ENCOUNTER
Pressure is very high.  She needs to take amlodipine 5 mg twice daily (which is an increase)    Continue with losartan as she is taking    I will send the vitamin-D to the [facility]    She needs to come in to see me this week for a blood pressure check, can she come in Friday

## 2018-08-27 NOTE — TELEPHONE ENCOUNTER
----- Message from Vitaliy Calderón sent at 8/27/2018 11:40 AM CDT -----  Contact: self [phone]  Patient requesting a call from the office in regards to her medication , stated she was seen today by nurse . Please call and advise, Thanks

## 2018-08-27 NOTE — PROGRESS NOTES
"B/p 164/110. P 90. Dr Sullivan informed. Pt was offered an appointment for noon today or to wait until Dr Sullivan could see her this am.  She refused all options. She was offered an early appointment for tomorrow am. She states"I'm not coming back this week. I have to work."  "

## 2018-08-27 NOTE — TELEPHONE ENCOUNTER
"Saadia Gaming LPN   Licensed Nurse      Progress Notes   Signed   Encounter Date:  8/27/2018                        []Hide copied text    []Hover for details      B/p 164/110. P 90. Dr Sullivan informed. Pt was offered an appointment for noon today or to wait until Dr Sullivan could see her this am.  She refused all options. She was offered an early appointment for tomorrow am. She states"I'm not coming back this week. I have to work."          Electronically signed by Saadia Gaming LPN at 8/27/2018  8:38 AM       Clinical Support on 8/27/2018            Detailed Report          "

## 2018-08-29 ENCOUNTER — TELEPHONE (OUTPATIENT)
Dept: INTERNAL MEDICINE | Facility: CLINIC | Age: 53
End: 2018-08-29

## 2018-08-29 NOTE — TELEPHONE ENCOUNTER
----- Message from Cayla Sal sent at 8/29/2018  1:17 PM CDT -----  Contact: self/[phone]  Pt called in regards to changeing her appointment on 8-31-18 to 9-7-18 at 4:00 pm. She did not want to see anyone else.      Please advise

## 2018-08-30 ENCOUNTER — PATIENT OUTREACH (OUTPATIENT)
Dept: ADMINISTRATIVE | Facility: HOSPITAL | Age: 53
End: 2018-08-30

## 2018-08-30 ENCOUNTER — TELEPHONE (OUTPATIENT)
Dept: INTERNAL MEDICINE | Facility: CLINIC | Age: 53
End: 2018-08-30

## 2018-08-30 NOTE — PROGRESS NOTES
Spoke with Yareli at [facility] to follow up on pt's eye exam report since no response to faxed record request. Per Yareli, pt's last exam was in 2016. Also, pt works at [employer] [facility] [employer] and was advised of the call about her record. Pt said she will have exam done soon. Please encourage at office visit 8/31/18.     Pt also still due for colon cancer screening.

## 2018-08-30 NOTE — TELEPHONE ENCOUNTER
----- Message from Vitaliy Calderón sent at 8/30/2018 12:01 PM CDT -----  Contact: [phone]  Patient requesting a call from the office to discuss medication amLODIPine (NORVASC) 5 MG tablet , stated she have question . Please advise, Thanks

## 2018-08-31 ENCOUNTER — TELEPHONE (OUTPATIENT)
Dept: INTERNAL MEDICINE | Facility: CLINIC | Age: 53
End: 2018-08-31

## 2018-08-31 NOTE — TELEPHONE ENCOUNTER
It looks like she wanted to change her appointment to the 7th of September at 4:00 p.m..  I can see her then for a very brief appointment, mainly for a BP check; we can double book.  She apparently cancelled today

## 2018-09-07 ENCOUNTER — OFFICE VISIT (OUTPATIENT)
Dept: INTERNAL MEDICINE | Facility: CLINIC | Age: 53
End: 2018-09-07
Payer: COMMERCIAL

## 2018-09-07 VITALS
WEIGHT: 191.81 LBS | BODY MASS INDEX: 38.74 KG/M2 | SYSTOLIC BLOOD PRESSURE: 122 MMHG | DIASTOLIC BLOOD PRESSURE: 78 MMHG | HEART RATE: 76 BPM

## 2018-09-07 DIAGNOSIS — E78.2 MIXED HYPERLIPIDEMIA: ICD-10-CM

## 2018-09-07 DIAGNOSIS — R06.83 SNORING: ICD-10-CM

## 2018-09-07 DIAGNOSIS — E66.01 SEVERE OBESITY (BMI 35.0-35.9 WITH COMORBIDITY): ICD-10-CM

## 2018-09-07 DIAGNOSIS — E11.29 TYPE 2 DIABETES MELLITUS WITH MICROALBUMINURIA, WITHOUT LONG-TERM CURRENT USE OF INSULIN: ICD-10-CM

## 2018-09-07 DIAGNOSIS — I10 ESSENTIAL HYPERTENSION: Primary | ICD-10-CM

## 2018-09-07 DIAGNOSIS — Z12.11 COLON CANCER SCREENING: ICD-10-CM

## 2018-09-07 DIAGNOSIS — R80.9 TYPE 2 DIABETES MELLITUS WITH MICROALBUMINURIA, WITHOUT LONG-TERM CURRENT USE OF INSULIN: ICD-10-CM

## 2018-09-07 PROCEDURE — 3074F SYST BP LT 130 MM HG: CPT | Mod: CPTII,S$GLB,, | Performed by: INTERNAL MEDICINE

## 2018-09-07 PROCEDURE — 3044F HG A1C LEVEL LT 7.0%: CPT | Mod: CPTII,S$GLB,, | Performed by: INTERNAL MEDICINE

## 2018-09-07 PROCEDURE — 3078F DIAST BP <80 MM HG: CPT | Mod: CPTII,S$GLB,, | Performed by: INTERNAL MEDICINE

## 2018-09-07 PROCEDURE — 3008F BODY MASS INDEX DOCD: CPT | Mod: CPTII,S$GLB,, | Performed by: INTERNAL MEDICINE

## 2018-09-07 PROCEDURE — 99214 OFFICE O/P EST MOD 30 MIN: CPT | Mod: S$GLB,,, | Performed by: INTERNAL MEDICINE

## 2018-09-07 PROCEDURE — 99999 PR PBB SHADOW E&M-EST. PATIENT-LVL III: CPT | Mod: PBBFAC,,, | Performed by: INTERNAL MEDICINE

## 2018-09-07 RX ORDER — AMLODIPINE BESYLATE 5 MG/1
5 TABLET ORAL 2 TIMES DAILY
Qty: 180 TABLET | Refills: 3 | Status: SHIPPED | OUTPATIENT
Start: 2018-09-07 | End: 2018-09-14

## 2018-09-07 RX ORDER — ATORVASTATIN CALCIUM 20 MG/1
20 TABLET, FILM COATED ORAL DAILY
Qty: 90 TABLET | Refills: 3
Start: 2018-09-07 | End: 2018-10-29 | Stop reason: SDUPTHER

## 2018-09-07 NOTE — PROGRESS NOTES
Subjective:       Patient ID: Amanda Ponce is a 52 y.o. female.    Chief Complaint: Hypertension     Follow-up multiple issues.     Doing well, deliberate weight loss.  No syncope, chest pain, pressure, tightness or shortness of breath.  Minimal snoring but no obvious apnea symptoms.  If not taking her bariatric medication but doing well otherwise.     Will have her eye exam soon and will forward me the report.  Will also have colonoscopy soon.      Blood pressure doing well at home with readings around 120-130 over 70-80.    Patient Active Problem List:     Mixed hyperlipidemia     Vitamin D deficiency disease     Essential hypertension     Type 2 diabetes mellitus with microalbuminuria, without long-term current use of insulin     Severe obesity (BMI 35.0-35.9 with comorbidity)        Review of Systems   Constitutional:        Deliberate weight loss   Respiratory: Negative for cough and shortness of breath.    Cardiovascular: Negative for chest pain.   Endocrine: Negative for polydipsia, polyphagia and polyuria.       Objective:      Physical Exam   Constitutional: She is oriented to person, place, and time. She appears well-developed and well-nourished.   HENT:   Head: Normocephalic and atraumatic.   Right Ear: External ear normal.   Left Ear: External ear normal.   Mouth/Throat: No oropharyngeal exudate.   Eyes: Conjunctivae and EOM are normal. No scleral icterus.   Neck: No thyromegaly present.   Cardiovascular: Normal rate.   Pulmonary/Chest: Effort normal. No respiratory distress.   Musculoskeletal: Normal range of motion. She exhibits no edema.   Neurological: She is alert and oriented to person, place, and time. No cranial nerve deficit. Coordination normal.   Skin: Skin is warm. No erythema.   Psychiatric: She has a normal mood and affect. Her behavior is normal. Judgment and thought content normal.   Nursing note and vitals reviewed.      Assessment:       1. Essential hypertension    2. Type 2 diabetes  mellitus with microalbuminuria, without long-term current use of insulin    3. Severe obesity (BMI 35.0-35.9 with comorbidity)    4. Snoring    5. Mixed hyperlipidemia    6. Colon cancer screening        Plan:         Essential hypertension:  Continue regimen.  Call if readings greater than 130/80 on a regular basis.  Keep follow-up for October.  -     amLODIPine (NORVASC) 5 MG tablet; Take 1 tablet (5 mg total) by mouth 2 (two) times daily.  Dispense: 180 tablet; Refill: 3    Type 2 diabetes mellitus with microalbuminuria, without long-term current use of insulin:   Labs and follow up in October    Severe obesity (BMI 35.0-35.9 with comorbidity):   Continue with exercise and healthy habits, slow and steady weight loss, she is doing well    Snoring:  Sleep apnea issues reviewed, she feels symptoms are better with weight loss, will continue to monitor.  Handouts given.    Mixed hyperlipidemia  -     atorvastatin (LIPITOR) 20 MG tablet; Take 1 tablet (20 mg total) by mouth once daily.  Dispense: 90 tablet; Refill: 3    Colon cancer screening  -     Case request GI: COLONOSCOPY    Patient counseled for over 50% of the 25 minute appt, all questions answered,  chart reviewed and care coordinated.

## 2018-09-13 ENCOUNTER — TELEPHONE (OUTPATIENT)
Dept: INTERNAL MEDICINE | Facility: CLINIC | Age: 53
End: 2018-09-13

## 2018-09-13 DIAGNOSIS — I10 ESSENTIAL HYPERTENSION: ICD-10-CM

## 2018-09-13 NOTE — TELEPHONE ENCOUNTER
----- Message from Verena Harry sent at 9/13/2018  1:29 PM CDT -----  Contact: self/[phone]  Patient called in regards needing for 's office to contact [facility] in regards rx amLODIPine (NORVASC) 5 MG tablet. Patient is been told that they have not receive request for refill. Please call and advise. Thank you

## 2018-09-13 NOTE — TELEPHONE ENCOUNTER
----- Message from Bethany Alvares sent at 9/13/2018  4:50 PM CDT -----  Contact: self [phone]  Pt states that her prescription for her amLODIPine (NORVASC) 5 MG tablet that was sent in to her [facility] [address] [place] [address] ([place] [place] [facility] [address] [facility] [address] for her to take twice daily, her [employer] Won't pay for it and she would like to know if it can be changed to once daily so it can be paid for.Please advise.      Thanks

## 2018-09-13 NOTE — TELEPHONE ENCOUNTER
Spoke to the pharmacy and they do have the RX   I called pt and notified  She will pick it up today

## 2018-09-14 ENCOUNTER — TELEPHONE (OUTPATIENT)
Dept: INTERNAL MEDICINE | Facility: CLINIC | Age: 53
End: 2018-09-14

## 2018-09-14 RX ORDER — AMLODIPINE BESYLATE 10 MG/1
10 TABLET ORAL DAILY
Qty: 90 TABLET | Refills: 3 | Status: SHIPPED | OUTPATIENT
Start: 2018-09-14 | End: 2018-10-29 | Stop reason: SDUPTHER

## 2018-09-14 NOTE — TELEPHONE ENCOUNTER
----- Message from Tala Dumont sent at 9/14/2018 12:03 PM CDT -----  Contact: [phone]  Patient is requesting a call from Alessandra in regards to her medication amLODIPine (NORVASC) 5 MG tablet.     Patient stated that her insurance will  Not cover it, she would have  to pay $180 out of pocket because she would be taking the medicine twice a day.    Please call and advise, thank you

## 2018-09-17 ENCOUNTER — TELEPHONE (OUTPATIENT)
Dept: INTERNAL MEDICINE | Facility: CLINIC | Age: 53
End: 2018-09-17

## 2018-09-17 ENCOUNTER — TELEPHONE (OUTPATIENT)
Dept: ENDOSCOPY | Facility: HOSPITAL | Age: 53
End: 2018-09-17

## 2018-09-17 DIAGNOSIS — Z12.11 SCREEN FOR COLON CANCER: Primary | ICD-10-CM

## 2018-09-17 RX ORDER — POLYETHYLENE GLYCOL 3350, SODIUM SULFATE ANHYDROUS, SODIUM BICARBONATE, SODIUM CHLORIDE, POTASSIUM CHLORIDE 236; 22.74; 6.74; 5.86; 2.97 G/4L; G/4L; G/4L; G/4L; G/4L
4 POWDER, FOR SOLUTION ORAL ONCE
Qty: 4000 ML | Refills: 0 | Status: SHIPPED | OUTPATIENT
Start: 2018-09-17 | End: 2018-09-17

## 2018-09-17 NOTE — TELEPHONE ENCOUNTER
----- Message from Vania Moraes sent at 9/17/2018  8:12 AM CDT -----  Contact: [facility] [phone]  Prior Authorization Needed    Medication: amLODIPine (NORVASC) 10 MG tablet    Pharmacy Info: [facility] [place] [address]    Plan does not cover this medication. Please call plan at [phone] to initiate prior authorization or call/fax pharmacy to change medication. Patient ID#[id number]    Note chart when prior authorization has been submitted.    Please notify pharmacy when prior authorization has been approved.    Thank You

## 2018-10-15 ENCOUNTER — TELEPHONE (OUTPATIENT)
Dept: ENDOSCOPY | Facility: HOSPITAL | Age: 53
End: 2018-10-15

## 2018-10-15 DIAGNOSIS — Z12.11 COLON CANCER SCREENING: Primary | ICD-10-CM

## 2018-10-15 RX ORDER — POLYETHYLENE GLYCOL 3350, SODIUM SULFATE ANHYDROUS, SODIUM BICARBONATE, SODIUM CHLORIDE, POTASSIUM CHLORIDE 236; 22.74; 6.74; 5.86; 2.97 G/4L; G/4L; G/4L; G/4L; G/4L
4 POWDER, FOR SOLUTION ORAL ONCE
Qty: 4000 ML | Refills: 0 | Status: SHIPPED | OUTPATIENT
Start: 2018-10-15 | End: 2018-10-15

## 2018-10-19 ENCOUNTER — ANESTHESIA (OUTPATIENT)
Dept: ENDOSCOPY | Facility: HOSPITAL | Age: 53
End: 2018-10-19
Payer: COMMERCIAL

## 2018-10-19 ENCOUNTER — ANESTHESIA EVENT (OUTPATIENT)
Dept: ENDOSCOPY | Facility: HOSPITAL | Age: 53
End: 2018-10-19
Payer: COMMERCIAL

## 2018-10-19 ENCOUNTER — HOSPITAL ENCOUNTER (OUTPATIENT)
Facility: HOSPITAL | Age: 53
Discharge: HOME | End: 2018-10-19
Attending: COLON & RECTAL SURGERY | Admitting: COLON & RECTAL SURGERY
Payer: COMMERCIAL

## 2018-10-19 VITALS
HEART RATE: 87 BPM | HEIGHT: 59 IN | RESPIRATION RATE: 18 BRPM | BODY MASS INDEX: 36.29 KG/M2 | OXYGEN SATURATION: 98 % | SYSTOLIC BLOOD PRESSURE: 118 MMHG | DIASTOLIC BLOOD PRESSURE: 69 MMHG | WEIGHT: 180 LBS | TEMPERATURE: 98 F

## 2018-10-19 DIAGNOSIS — E11.29 TYPE 2 DIABETES MELLITUS WITH MICROALBUMINURIA, WITHOUT LONG-TERM CURRENT USE OF INSULIN: ICD-10-CM

## 2018-10-19 DIAGNOSIS — R80.9 TYPE 2 DIABETES MELLITUS WITH MICROALBUMINURIA, WITHOUT LONG-TERM CURRENT USE OF INSULIN: ICD-10-CM

## 2018-10-19 DIAGNOSIS — Z12.11 SCREENING FOR COLON CANCER: ICD-10-CM

## 2018-10-19 DIAGNOSIS — I10 ESSENTIAL HYPERTENSION: Primary | ICD-10-CM

## 2018-10-19 PROCEDURE — E9220 PRA ENDO ANESTHESIA: HCPCS | Mod: ,,, | Performed by: NURSE ANESTHETIST, CERTIFIED REGISTERED

## 2018-10-19 PROCEDURE — 25000003 PHARM REV CODE 250: Performed by: NURSE PRACTITIONER

## 2018-10-19 PROCEDURE — G0121 COLON CA SCRN NOT HI RSK IND: HCPCS | Performed by: COLON & RECTAL SURGERY

## 2018-10-19 PROCEDURE — 37000008 HC ANESTHESIA 1ST 15 MINUTES: Performed by: COLON & RECTAL SURGERY

## 2018-10-19 PROCEDURE — 63600175 PHARM REV CODE 636 W HCPCS: Performed by: NURSE ANESTHETIST, CERTIFIED REGISTERED

## 2018-10-19 PROCEDURE — G0121 COLON CA SCRN NOT HI RSK IND: HCPCS | Mod: ,,, | Performed by: COLON & RECTAL SURGERY

## 2018-10-19 PROCEDURE — 37000009 HC ANESTHESIA EA ADD 15 MINS: Performed by: COLON & RECTAL SURGERY

## 2018-10-19 RX ORDER — LIDOCAINE HCL/PF 100 MG/5ML
SYRINGE (ML) INTRAVENOUS
Status: DISCONTINUED | OUTPATIENT
Start: 2018-10-19 | End: 2018-10-19

## 2018-10-19 RX ORDER — PROPOFOL 10 MG/ML
VIAL (ML) INTRAVENOUS CONTINUOUS PRN
Status: DISCONTINUED | OUTPATIENT
Start: 2018-10-19 | End: 2018-10-19

## 2018-10-19 RX ORDER — PROPOFOL 10 MG/ML
VIAL (ML) INTRAVENOUS
Status: DISCONTINUED | OUTPATIENT
Start: 2018-10-19 | End: 2018-10-19

## 2018-10-19 RX ORDER — SODIUM CHLORIDE 0.9 % (FLUSH) 0.9 %
3 SYRINGE (ML) INJECTION
Status: DISCONTINUED | OUTPATIENT
Start: 2018-10-19 | End: 2018-10-19 | Stop reason: HOSPADM

## 2018-10-19 RX ORDER — SODIUM CHLORIDE 9 MG/ML
INJECTION, SOLUTION INTRAVENOUS CONTINUOUS
Status: DISCONTINUED | OUTPATIENT
Start: 2018-10-19 | End: 2018-10-19 | Stop reason: HOSPADM

## 2018-10-19 RX ADMIN — PROPOFOL 200 MCG/KG/MIN: 10 INJECTION, EMULSION INTRAVENOUS at 11:10

## 2018-10-19 RX ADMIN — LIDOCAINE HYDROCHLORIDE 100 MG: 20 INJECTION, SOLUTION INTRAVENOUS at 11:10

## 2018-10-19 RX ADMIN — PROPOFOL 30 MG: 10 INJECTION, EMULSION INTRAVENOUS at 11:10

## 2018-10-19 RX ADMIN — PROPOFOL 100 MG: 10 INJECTION, EMULSION INTRAVENOUS at 11:10

## 2018-10-19 RX ADMIN — SODIUM CHLORIDE: 0.9 INJECTION, SOLUTION INTRAVENOUS at 11:10

## 2018-10-19 NOTE — [employer] PATIENT INSTRUCTIONS
Discharge Summary/Instructions after an Endoscopic Procedure  Patient Name: Amanda Ponce  Patient MRN: [id number]  Patient YOB: 1965  Friday, October 19, 2018  Cali De Jesus MD  RESTRICTIONS:  During your procedure today, you received medications for sedation.  These   medications may affect your judgment, balance and coordination.  Therefore,   for 24 hours, you have the following restrictions:   - DO NOT drive a car, operate machinery, make legal/financial decisions,   sign important papers or drink alcohol.    ACTIVITY:  Today: no heavy lifting, straining or running due to procedural   sedation/anesthesia.  The following day: return to full activity including work.  DIET:  Eat and drink normally unless instructed otherwise.     TREATMENT FOR COMMON SIDE EFFECTS:  - Mild abdominal pain, nausea, belching, bloating or excessive gas:  rest,   eat lightly and use a heating pad.  - Sore Throat: treat with throat lozenges and/or gargle with warm salt   water.  - Because air was used during the procedure, expelling large amounts of air   from your rectum or belching is normal.  - If a bowel prep was taken, you may not have a bowel movement for 1-3 days.    This is normal.  SYMPTOMS TO WATCH FOR AND REPORT TO YOUR PHYSICIAN:  1. Abdominal pain or bloating, other than gas cramps.  2. Chest pain.  3. Back pain.  4. Signs of infection such as: chills or fever occurring within 24 hours   after the procedure.  5. Rectal bleeding, which would show as bright red, maroon, or black stools.   (A tablespoon of blood from the rectum is not serious, especially if   hemorrhoids are present.)  6. Vomiting.  7. Weakness or dizziness.  GO DIRECTLY TO THE NEAREST EMERGENCY ROOM IF YOU HAVE ANY OF THE FOLLOWING:      Difficulty breathing              Chills and/or fever over 101 F   Persistent vomiting and/or vomiting blood   Severe abdominal pain   Severe chest pain   Black, tarry stools   Bleeding- more than one  tablespoon   Any other symptom or condition that you feel may need urgent attention  Your doctor recommends these additional instructions:  If any biopsies were taken, your doctors clinic will contact you in 1 to 2   weeks with any results.  - Discharge patient to home.   - Resume previous diet.   - Continue present medications.   - Patient has a contact number available for emergencies.  The signs and   symptoms of potential delayed complications were discussed with the   patient.  Return to normal activities tomorrow.  Written discharge   instructions were provided to the patient.   - Repeat colonoscopy in 10 years for screening purposes.  For questions, problems or results please call your physician - Cali De Jesus MD at Work:  [phone].  [facility], EMERGENCY ROOM PHONE NUMBER: [phone]  IF A COMPLICATION OR EMERGENCY SITUATION ARISES AND YOU ARE UNABLE TO REACH   YOUR PHYSICIAN - GO DIRECTLY TO THE EMERGENCY ROOM.  Cali De Jesus MD  10/19/2018 11:36:14 AM  This report has been verified and signed electronically.  [employer]

## 2018-10-19 NOTE — ANESTHESIA PREPROCEDURE EVALUATION
10/19/2018  Amanda Ponce is a 53 y.o., female.    Anesthesia Evaluation    I have reviewed the Patient Summary Reports.     I have reviewed the Medications.     Review of Systems  Anesthesia Hx:   Denies Personal Hx of Anesthesia complications.   Hematology/Oncology:  Hematology Normal   Oncology Normal     EENT/Dental:EENT/Dental Normal   Cardiovascular:   Hypertension hyperlipidemia    Pulmonary:  Pulmonary Normal    Renal/:  Renal/ Normal     Hepatic/GI:  Hepatic/GI Normal    Musculoskeletal:  Musculoskeletal Normal    Neurological:  Neurology Normal    Endocrine:   Diabetes    Dermatological:  Skin Normal    Psych:  Psychiatric Normal           Physical Exam  General:  Obesity    Airway/Jaw/Neck:  Airway Findings: Mouth Opening: Normal Tongue: Normal  General Airway Assessment: Adult  Mallampati: II  TM Distance: Normal, at least 6 cm        Eyes/Ears/Nose:  EYES/EARS/NOSE FINDINGS: Normal   Dental:  Dental Findings: In tact   Chest/Lungs:  Chest/Lungs Clear    Heart/Vascular:  Heart Findings: Normal Heart murmur: negative Vascular Findings: Normal    Abdomen:  Abdomen Findings: Normal    Musculoskeletal:  Musculoskeletal Findings: Normal   Skin:  Skin Findings: Normal    Mental Status:  Mental Status Findings: Normal        Anesthesia Plan  Type of Anesthesia, risks & benefits discussed:  Anesthesia Type:  general  Patient's Preference: General  Intra-op Monitoring Plan: standard ASA monitors  Intra-op Monitoring Plan Comments:   Post Op Pain Control Plan:   Post Op Pain Control Plan Comments:   Induction:   IV  Beta Blocker:  Patient is not currently on a Beta-Blocker (No further documentation required).       Informed Consent: Patient understands risks and agrees with Anesthesia plan.  Questions answered. Anesthesia consent signed with patient.  ASA Score: 2     Day of Surgery Review of History &  Physical:    H&P update referred to the surgeon.         Ready For Surgery From Anesthesia Perspective.

## 2018-10-19 NOTE — ANESTHESIA POSTPROCEDURE EVALUATION
"Anesthesia Post Evaluation    Patient: Amanda Ponce    Procedure(s) Performed: Procedure(s) (LRB):  COLONOSCOPY (N/A)    Final Anesthesia Type: general  Patient location during evaluation: GI PACU  Patient participation: Yes- Able to Participate  Level of consciousness: awake and alert and oriented  Post-procedure vital signs: reviewed and stable  Pain management: adequate  Airway patency: patent  PONV status at discharge: No PONV  Anesthetic complications: no      Cardiovascular status: stable  Respiratory status: unassisted, spontaneous ventilation and room air  Hydration status: euvolemic  Follow-up not needed.        Visit Vitals  /69 (BP Location: Left arm, Patient Position: Lying)   Pulse 87   Temp 36.6 °C (97.9 °F) (Temporal)   Resp 18   Ht 4' 11" (1.499 m)   Wt 81.6 kg (180 lb)   SpO2 98%   Breastfeeding? No   BMI 36.36 kg/m²       Pain/Tone Score: Pain Assessment Performed: Yes (10/19/2018 11:40 AM)  Presence of Pain: non-verbal indicators absent (10/19/2018 11:40 AM)  Pain Rating Prior to Med Admin: 0 (10/19/2018 10:18 AM)  Tone Score: 10 (10/19/2018 12:00 PM)        "

## 2018-10-19 NOTE — H&P
Endoscopy H&P    Procedure : Colonoscopy      asymptomatic screening exam      Past Medical History:   Diagnosis Date    HTN (hypertension)     Obesity, Class II, BMI 35-39.9, with comorbidity 12/19/2016    Vitamin D deficiency disease 3/24/2015             Review of Systems -ROS:  GENERAL: No fever, chills, fatigability or weight loss.  CHEST: Denies MEYERS, cyanosis, wheezing, cough and sputum production.  CARDIOVASCULAR: Denies chest pain, PND, orthopnea or reduced exercise tolerance.   Musculoskeletal ROS: negative for - gait disturbance or joint pain  Neurological ROS: negative for - confusion or memory loss        Physical Exam:  General: well developed, well nourished, no distress  Head: normocephalic  Neck: supple, symmetrical, trachea midline  Lungs:  clear to auscultation bilaterally and normal respiratory effort  Heart: regular rate and rhythm, S1, S2 normal, no murmur, rub or gallop and regular rate and rhythm  Abdomen: soft, non-tender non-distented; bowel sounds normal; no masses,  no organomegaly  Extremities: no cyanosis or edema, or clubbing       Moderate Sedation (choice): Mallampati Score 1    ASA : II    IMP: asymptomatic screening exam    Plan: Colonoscopy with Moderate sedation.  I have explained the procedure including indications, alternatives, expected outcomes and potential complications. The patient appears to understand and gives informed consent. The patient is medically ready for surgery.

## 2018-10-22 ENCOUNTER — LAB VISIT (OUTPATIENT)
Dept: LAB | Facility: HOSPITAL | Age: 53
End: 2018-10-22
Attending: INTERNAL MEDICINE
Payer: COMMERCIAL

## 2018-10-22 DIAGNOSIS — E55.9 VITAMIN D DEFICIENCY DISEASE: ICD-10-CM

## 2018-10-22 DIAGNOSIS — E11.8 TYPE 2 DIABETES MELLITUS WITH COMPLICATION, WITHOUT LONG-TERM CURRENT USE OF INSULIN: ICD-10-CM

## 2018-10-22 DIAGNOSIS — E78.2 MIXED HYPERLIPIDEMIA: ICD-10-CM

## 2018-10-22 LAB
25(OH)D3+25(OH)D2 SERPL-MCNC: 36 NG/ML
ALBUMIN SERPL BCP-MCNC: 3.8 G/DL
ALP SERPL-CCNC: 94 U/L
ALT SERPL W/O P-5'-P-CCNC: 25 U/L
ANION GAP SERPL CALC-SCNC: 8 MMOL/L
AST SERPL-CCNC: 26 U/L
BILIRUB SERPL-MCNC: 0.3 MG/DL
BUN SERPL-MCNC: 21 MG/DL
CALCIUM SERPL-MCNC: 10.1 MG/DL
CHLORIDE SERPL-SCNC: 103 MMOL/L
CHOLEST SERPL-MCNC: 200 MG/DL
CHOLEST/HDLC SERPL: 3.8 {RATIO}
CO2 SERPL-SCNC: 28 MMOL/L
CREAT SERPL-MCNC: 0.8 MG/DL
EST. GFR  (AFRICAN AMERICAN): >60 ML/MIN/1.73 M^2
EST. GFR  (NON AFRICAN AMERICAN): >60 ML/MIN/1.73 M^2
ESTIMATED AVG GLUCOSE: 131 MG/DL
GLUCOSE SERPL-MCNC: 120 MG/DL
HBA1C MFR BLD HPLC: 6.2 %
HDLC SERPL-MCNC: 53 MG/DL
HDLC SERPL: 26.5 %
LDLC SERPL CALC-MCNC: 124.8 MG/DL
NONHDLC SERPL-MCNC: 147 MG/DL
POTASSIUM SERPL-SCNC: 4.1 MMOL/L
PROT SERPL-MCNC: 8.6 G/DL
SODIUM SERPL-SCNC: 139 MMOL/L
TRIGL SERPL-MCNC: 111 MG/DL

## 2018-10-22 PROCEDURE — 80053 COMPREHEN METABOLIC PANEL: CPT

## 2018-10-22 PROCEDURE — 83036 HEMOGLOBIN GLYCOSYLATED A1C: CPT

## 2018-10-22 PROCEDURE — 82306 VITAMIN D 25 HYDROXY: CPT

## 2018-10-22 PROCEDURE — 80061 LIPID PANEL: CPT

## 2018-10-22 PROCEDURE — 36415 COLL VENOUS BLD VENIPUNCTURE: CPT | Mod: PO

## 2018-10-26 ENCOUNTER — TELEPHONE (OUTPATIENT)
Dept: ENDOSCOPY | Facility: HOSPITAL | Age: 53
End: 2018-10-26

## 2018-10-29 ENCOUNTER — OFFICE VISIT (OUTPATIENT)
Dept: INTERNAL MEDICINE | Facility: CLINIC | Age: 53
End: 2018-10-29
Payer: COMMERCIAL

## 2018-10-29 ENCOUNTER — CLINICAL SUPPORT (OUTPATIENT)
Dept: OPTOMETRY | Facility: CLINIC | Age: 53
End: 2018-10-29
Attending: INTERNAL MEDICINE
Payer: COMMERCIAL

## 2018-10-29 VITALS
DIASTOLIC BLOOD PRESSURE: 80 MMHG | SYSTOLIC BLOOD PRESSURE: 135 MMHG | WEIGHT: 191.81 LBS | BODY MASS INDEX: 38.67 KG/M2 | HEIGHT: 59 IN

## 2018-10-29 DIAGNOSIS — E11.29 TYPE 2 DIABETES MELLITUS WITH MICROALBUMINURIA, WITHOUT LONG-TERM CURRENT USE OF INSULIN: ICD-10-CM

## 2018-10-29 DIAGNOSIS — E66.01 SEVERE OBESITY (BMI 35.0-35.9 WITH COMORBIDITY): ICD-10-CM

## 2018-10-29 DIAGNOSIS — R80.9 TYPE 2 DIABETES MELLITUS WITH MICROALBUMINURIA, WITHOUT LONG-TERM CURRENT USE OF INSULIN: Primary | ICD-10-CM

## 2018-10-29 DIAGNOSIS — R80.9 TYPE 2 DIABETES MELLITUS WITH MICROALBUMINURIA, WITHOUT LONG-TERM CURRENT USE OF INSULIN: ICD-10-CM

## 2018-10-29 DIAGNOSIS — E78.2 MIXED HYPERLIPIDEMIA: ICD-10-CM

## 2018-10-29 DIAGNOSIS — E11.29 TYPE 2 DIABETES MELLITUS WITH MICROALBUMINURIA, WITHOUT LONG-TERM CURRENT USE OF INSULIN: Primary | ICD-10-CM

## 2018-10-29 DIAGNOSIS — E55.9 VITAMIN D DEFICIENCY DISEASE: ICD-10-CM

## 2018-10-29 DIAGNOSIS — I10 ESSENTIAL HYPERTENSION: ICD-10-CM

## 2018-10-29 PROBLEM — Z12.11 SCREENING FOR COLON CANCER: Status: RESOLVED | Noted: 2018-10-19 | Resolved: 2018-10-29

## 2018-10-29 PROCEDURE — 90472 IMMUNIZATION ADMIN EACH ADD: CPT | Mod: S$GLB,,, | Performed by: INTERNAL MEDICINE

## 2018-10-29 PROCEDURE — 99999 PR PBB SHADOW E&M-EST. PATIENT-LVL I: CPT | Mod: PBBFAC,,,

## 2018-10-29 PROCEDURE — 99214 OFFICE O/P EST MOD 30 MIN: CPT | Mod: 25,S$GLB,, | Performed by: INTERNAL MEDICINE

## 2018-10-29 PROCEDURE — 99999 PR PBB SHADOW E&M-EST. PATIENT-LVL III: CPT | Mod: PBBFAC,,, | Performed by: INTERNAL MEDICINE

## 2018-10-29 PROCEDURE — 3075F SYST BP GE 130 - 139MM HG: CPT | Mod: CPTII,S$GLB,, | Performed by: INTERNAL MEDICINE

## 2018-10-29 PROCEDURE — 90715 TDAP VACCINE 7 YRS/> IM: CPT | Mod: S$GLB,,, | Performed by: INTERNAL MEDICINE

## 2018-10-29 PROCEDURE — 90662 IIV NO PRSV INCREASED AG IM: CPT | Mod: 59,S$GLB,, | Performed by: INTERNAL MEDICINE

## 2018-10-29 PROCEDURE — 90471 IMMUNIZATION ADMIN: CPT | Mod: S$GLB,,, | Performed by: INTERNAL MEDICINE

## 2018-10-29 PROCEDURE — 3008F BODY MASS INDEX DOCD: CPT | Mod: CPTII,S$GLB,, | Performed by: INTERNAL MEDICINE

## 2018-10-29 PROCEDURE — 3044F HG A1C LEVEL LT 7.0%: CPT | Mod: CPTII,S$GLB,, | Performed by: INTERNAL MEDICINE

## 2018-10-29 PROCEDURE — 92250 FUNDUS PHOTOGRAPHY W/I&R: CPT | Mod: S$GLB,,, | Performed by: OPHTHALMOLOGY

## 2018-10-29 PROCEDURE — 3079F DIAST BP 80-89 MM HG: CPT | Mod: CPTII,S$GLB,, | Performed by: INTERNAL MEDICINE

## 2018-10-29 RX ORDER — AMLODIPINE BESYLATE 10 MG/1
10 TABLET ORAL DAILY
Qty: 90 TABLET | Refills: 3 | Status: SHIPPED | OUTPATIENT
Start: 2018-10-29 | End: 2019-11-29 | Stop reason: SDUPTHER

## 2018-10-29 RX ORDER — ERGOCALCIFEROL 1.25 MG/1
50000 CAPSULE ORAL
Qty: 12 CAPSULE | Refills: 3 | Status: SHIPPED | OUTPATIENT
Start: 2018-10-29 | End: 2019-11-29 | Stop reason: SDUPTHER

## 2018-10-29 RX ORDER — PHENTERMINE HYDROCHLORIDE 37.5 MG/1
37.5 TABLET ORAL
Qty: 30 TABLET | Refills: 0 | Status: SHIPPED | OUTPATIENT
Start: 2018-10-29 | End: 2018-11-28

## 2018-10-29 RX ORDER — ATORVASTATIN CALCIUM 20 MG/1
20 TABLET, FILM COATED ORAL DAILY
Qty: 90 TABLET | Refills: 3
Start: 2018-10-29 | End: 2018-10-29 | Stop reason: SDUPTHER

## 2018-10-29 RX ORDER — ATORVASTATIN CALCIUM 20 MG/1
20 TABLET, FILM COATED ORAL DAILY
Qty: 90 TABLET | Refills: 3 | Status: SHIPPED | OUTPATIENT
Start: 2018-10-29 | End: 2019-11-29 | Stop reason: SDUPTHER

## 2018-10-29 RX ORDER — LOSARTAN POTASSIUM 100 MG/1
100 TABLET ORAL DAILY
Qty: 90 TABLET | Refills: 3 | Status: SHIPPED | OUTPATIENT
Start: 2018-10-29 | End: 2019-11-29 | Stop reason: SDUPTHER

## 2018-10-29 NOTE — PATIENT INSTRUCTIONS
Prevention Guidelines, Women Ages 50 to 64  Screening tests and vaccines are an important part of managing your health. Health counseling is essential, too. Below are guidelines for these, for women ages 50 to 64. Talk with your healthcare provider to make sure youre up to date on what you need.  Screening Who needs it How often   Type 2 diabetes or prediabetes All adults beginning at age 45 and adults without symptoms at any age who are overweight or obese and have 1 or more additional risk factors for diabetes. At  least every 3 years   Alcohol misuse All women in this age group At routine exams   Blood pressure All women in this age group Every 2 years if your blood pressure is less than 120/80 mm Hg; yearly if your systolic blood pressure is 120 to 139 mm Hg, or your diastolic blood pressure reading is 80 to 89 mm Hg   Breast cancer All women in this age group Yearly mammogram and clinical breast exam1   Cervical cancer All women in this age group, except women who have had a complete hysterectomy Pap test every 3 years or Pap test with human papillomavirus (HPV) test every 5 years   Chlamydia Women at increased risk for infection At routine exams   Colorectal cancer All women in this age group Flexible sigmoidoscopy every 5 years, or colonoscopy every 10 years, or double-contrast barium enema every 5 years; yearly fecal occult blood test or fecal immunochemical test; or a stool DNA test as often as your health care provider advises; talk with your health care provider about which tests are best for you   Depression All women in this age group At routine exams   Gonorrhea Sexually active women at increased risk for infection At routine exams   Hepatitis C Anyone at increased risk; 1 time for those born between 1945 and 1965 At routine exams   High cholesterol or triglycerides All women in this age group who are at risk for coronary artery disease At least every 5 years   HIV All women At routine exams   Lung  cancer Adults age 55 to 80 who have smoked Yearly screening in smokers with 30 pack-year history of smoking or who quit within 15 years   Obesity All women in this age group At routine exams   Osteoporosis Women who are postmenopausal Ask your healthcare provider   Syphilis Women at increased risk for infection - talk with your healthcare provider At routine exams   Tuberculosis Women at increased risk for infection - talk with your healthcare provider Ask your healthcare provider   Vision All women in this age group Ask your healthcare provider   Vaccine Who needs it How often   Chickenpox (varicella) All women in this age group who have no record of this infection or vaccine 2 doses; the second dose should be given at least 4 weeks after the first dose   Hepatitis A Women at increased risk for infection - talk with your healthcare provider 2 doses given at least 6 months apart   Hepatitis B Women at increased risk for infection - talk with your healthcare provider 3 doses over 6 months; second dose should be given 1 month after the first dose; the third dose should be given at least 2 months after the second dose and at least 4 months after the first dose   Haemophilus influenzaeType B (HIB) Women at increased risk for infection - talk with your healthcare provider 1 to 3 doses   Influenza (flu) All women in this age group Once a year   Measles, mumps, rubella (MMR) Women in this age group through their late 50s who have no record of these infections or vaccines 1 dose   Meningococcal Women at increased risk for infection - talk with your healthcare provider 1 or more doses   Pneumococcal conjugate vaccine (PCV13) and pneumococcal polysaccharide vaccine (PPSV23) Women at increased risk for infection - talk with your healthcare provider PCV13: 1 dose ages 19 to 65 (protects against 13 types of pneumococcal bacteria)  PPSV23: 1 to 2 doses through age 64, or 1 dose at 65 or older (protects against 23 types of  pneumococcal bacteria)   Tetanus/diphtheria/pertussis (Td/Tdap) booster All women in this age group Td every 10 years, or a one-time dose of Tdap instead of a Td booster after age 18, then Td every 10 years   Zoster All women ages 60 and older 1 dose   Counseling Who needs it How often   BRCA gene mutation testing for breast and ovarian cancer susceptibility Women with increased risk for having gene mutation When your risk is known   Breast cancer and chemoprevention Women at high risk for breast cancer When your risk is known   Diet and exercise Women who are overweight or obese When diagnosed, and then at routine exams   Sexually transmitted infection prevention Women at increased risk for infection - talk with your healthcare provider At routine exams   Use of daily aspirin Women ages 55 and up in this age group who are at risk for cardiovascular health problems such as stroke When your risk is known   Use of tobacco and the health effects it can cause All women in this age group Every exam   1American [employer]  Date Last Reviewed: 1/26/2016  © 0679-2955 [employer]. [address] [place] [address], [place] [address]. All rights reserved. This information is not intended as a substitute for professional medical care. Always follow your healthcare professional's instructions.

## 2018-10-29 NOTE — PROGRESS NOTES
Subjective:       Patient ID: Amanda Ponce is a 53 y.o. female.    Chief Complaint: Follow-up    Here for follow-up of multiple medical issues.    Blood pressure doing well.    Urine for microalbumin is improved.    Colonoscopy has been completed    Due for tetanus and flu shot.  Still due for eye exam.    Statin therapy recommended.  I HAVE GIVEN HER A PRESCRIPTION BUT SHE HAD NOT TAKEN YET, SHE PLANS TO START.    Will have eye camera today.    Patient Active Problem List:     Mixed hyperlipidemia     Vitamin D deficiency disease     Essential hypertension     Type 2 diabetes mellitus with microalbuminuria, without long-term current use of insulin     Severe obesity (BMI 35.0-35.9 with comorbidity)        Review of Systems   Constitutional: Negative for activity change, appetite change, chills, fatigue and fever.   HENT: Negative for congestion, hearing loss, sinus pressure and sore throat.    Eyes: Negative for visual disturbance.   Respiratory: Negative for apnea, cough, shortness of breath and wheezing.    Cardiovascular: Negative for chest pain, palpitations and leg swelling.   Gastrointestinal: Negative for abdominal distention, abdominal pain, constipation, diarrhea, nausea and vomiting.   Genitourinary: Negative for dysuria, frequency, hematuria and vaginal bleeding.   Musculoskeletal: Negative for gait problem, joint swelling and myalgias.   Skin: Negative for rash.   Neurological: Negative for dizziness, weakness, light-headedness and headaches.   Hematological: Negative for adenopathy. Does not bruise/bleed easily.   Psychiatric/Behavioral: Negative for confusion, hallucinations, sleep disturbance and suicidal ideas.       Objective:      Physical Exam   Constitutional: She is oriented to person, place, and time. She appears well-developed and well-nourished.   HENT:   Head: Normocephalic and atraumatic.   Right Ear: External ear normal.   Left Ear: External ear normal.   Mouth/Throat: Oropharynx is  clear and moist.   Eyes: Conjunctivae are normal.   Neck: Normal range of motion. Neck supple. No thyromegaly present.   Cardiovascular: Normal rate, regular rhythm and normal heart sounds.   Pulmonary/Chest: No respiratory distress. She has no wheezes. She has no rales.   Abdominal: Soft. Bowel sounds are normal.   Musculoskeletal: She exhibits no edema.   Lymphadenopathy:     She has no cervical adenopathy.   Neurological: She is alert and oriented to person, place, and time.   Skin: Skin is warm and dry. No rash noted. No erythema.       Assessment:       1. Type 2 diabetes mellitus with microalbuminuria, without long-term current use of insulin    2. Essential hypertension    3. Vitamin D deficiency disease    4. Mixed hyperlipidemia    5. Severe obesity (BMI 35.0-35.9 with comorbidity)        Plan:     Type 2 diabetes mellitus with microalbuminuria, without long-term current use of insulin  -     Diabetic Eye Screening Photo; Future  -     Hemoglobin A1c; Future; Expected date: 02/26/2019    Essential hypertension  -     losartan (COZAAR) 100 MG tablet; Take 1 tablet (100 mg total) by mouth once daily.  Dispense: 90 tablet; Refill: 3  -     amLODIPine (NORVASC) 10 MG tablet; Take 1 tablet (10 mg total) by mouth once daily.  Dispense: 90 tablet; Refill: 3    Vitamin D deficiency disease  -     ergocalciferol (ERGOCALCIFEROL) 50,000 unit Cap; Take 1 capsule (50,000 Units total) by mouth every 7 days.  Dispense: 12 capsule; Refill: 3    Mixed hyperlipidemia  -     atorvastatin (LIPITOR) 20 MG tablet; Take 1 tablet (20 mg total) by mouth once daily.  Dispense: 90 tablet; Refill: 3  -     AST (SGOT); Future; Expected date: 02/26/2019  -     Lipid panel; Future; Expected date: 02/26/2019    Severe obesity (BMI 35.0-35.9 with comorbidity)    Other orders  -     phentermine (ADIPEX-P) 37.5 mg tablet; Take 1 tablet (37.5 mg total) by mouth before breakfast.  Dispense: 30 tablet; Refill: 0.  She has done well with this.   Follow-up in 1 month.  Exercise and lifestyle issues reviewed    Flu shot and tetanus shot today  Consider shingles vaccine  Labs only 4 months, return sooner with problems in the interim

## 2018-10-29 NOTE — PROGRESS NOTES
HPI     Diabetic Eye Exam      Additional comments: photos              Comments     Screening photos          Last edited by Susan Bravo MA on 10/29/2018  8:16 AM. (History)            Assessment /Plan     For exam results, see Encounter Report.    Type 2 diabetes mellitus with microalbuminuria, without long-term current use of insulin  -     Diabetic Eye Screening Photo      53 y.o. y/o here for screening for Diabetic Renopathy with non-dilated fundus photos per Malini Sullivan MD

## 2018-11-06 ENCOUNTER — TELEPHONE (OUTPATIENT)
Dept: OBSTETRICS AND GYNECOLOGY | Facility: CLINIC | Age: 53
End: 2018-11-06

## 2018-11-06 NOTE — TELEPHONE ENCOUNTER
Called patient offered patient appt today>>  patient declined .. appt scheduled for Thursday 1:30 patient is aware of date and time..

## 2018-11-06 NOTE — TELEPHONE ENCOUNTER
----- Message from Lucy Joy sent at 11/6/2018  4:10 PM CST -----  Contact: Patient   Patient called to be seen as soon as possible for possible UTI and vaginal discharge.The patient states that she's called before and no one called her back to schedule. The patient can be reached at [phone].

## 2018-11-08 ENCOUNTER — OFFICE VISIT (OUTPATIENT)
Dept: OBSTETRICS AND GYNECOLOGY | Facility: CLINIC | Age: 53
End: 2018-11-08
Payer: COMMERCIAL

## 2018-11-08 VITALS
SYSTOLIC BLOOD PRESSURE: 126 MMHG | HEIGHT: 59 IN | WEIGHT: 191.81 LBS | BODY MASS INDEX: 38.67 KG/M2 | DIASTOLIC BLOOD PRESSURE: 80 MMHG

## 2018-11-08 DIAGNOSIS — R30.0 DYSURIA: ICD-10-CM

## 2018-11-08 DIAGNOSIS — Z01.419 VISIT FOR GYNECOLOGIC EXAMINATION: Primary | ICD-10-CM

## 2018-11-08 DIAGNOSIS — N95.9 MENOPAUSAL AND PERIMENOPAUSAL DISORDER: ICD-10-CM

## 2018-11-08 PROCEDURE — 99396 PREV VISIT EST AGE 40-64: CPT | Mod: S$GLB,,, | Performed by: OBSTETRICS & GYNECOLOGY

## 2018-11-08 PROCEDURE — 87184 SC STD DISK METHOD PER PLATE: CPT

## 2018-11-08 PROCEDURE — 3074F SYST BP LT 130 MM HG: CPT | Mod: CPTII,S$GLB,, | Performed by: OBSTETRICS & GYNECOLOGY

## 2018-11-08 PROCEDURE — 87077 CULTURE AEROBIC IDENTIFY: CPT

## 2018-11-08 PROCEDURE — 87086 URINE CULTURE/COLONY COUNT: CPT

## 2018-11-08 PROCEDURE — 87186 SC STD MICRODIL/AGAR DIL: CPT

## 2018-11-08 PROCEDURE — 87088 URINE BACTERIA CULTURE: CPT

## 2018-11-08 PROCEDURE — 3079F DIAST BP 80-89 MM HG: CPT | Mod: CPTII,S$GLB,, | Performed by: OBSTETRICS & GYNECOLOGY

## 2018-11-08 PROCEDURE — 99999 PR PBB SHADOW E&M-EST. PATIENT-LVL II: CPT | Mod: PBBFAC,,, | Performed by: OBSTETRICS & GYNECOLOGY

## 2018-11-08 RX ORDER — PHENAZOPYRIDINE HYDROCHLORIDE 200 MG/1
200 TABLET, FILM COATED ORAL 3 TIMES DAILY PRN
Qty: 20 TABLET | Refills: 0 | Status: SHIPPED | OUTPATIENT
Start: 2018-11-08 | End: 2019-11-08

## 2018-11-08 RX ORDER — NITROFURANTOIN 25; 75 MG/1; MG/1
100 CAPSULE ORAL 2 TIMES DAILY
Qty: 14 CAPSULE | Refills: 0 | Status: SHIPPED | OUTPATIENT
Start: 2018-11-08 | End: 2018-11-12

## 2018-11-08 NOTE — PROGRESS NOTES
HISTORY OF PRESENT ILLNESS:    Amanda Ponce is a 53 y.o. female,   presents today for her routine visit and has no complaints OTHER THAN BURNING WITH URINATION, PAST WEEK, UNIMPROVED WITH OTC REMEDIES AND INCREASED WITH INTERCOURSE.  EMPIRIC MACROBID AND PYRIDIUM, URINE CULTURE.  DISCUSSED PREVENTATIVE PRACTICES AROUND THE TIME OF INTERCOURSE.  MAMMO IS UP TO DATE  REMARRIED LAST YEAR AND STILL LOOKING AFTER GRANDKIDS    LAST VISIT :  MAMMO 2 MO AGO, UP TO DATE.  OCCAS MONTHS WIUTH HOT FLUSHES AND OTHER MONTHS NOT SO MUCH A TROUBLE - DISCUSSED PHYTOESTROGENS AND COMFORT CARE - WILL LET ME KNOW IF WORSENS AND WANTS HORMONAL TREATMENT.  DISCUSSED LIBIDO.  LOOKING AFTER TWO LITTLE GRANDCHILDREN . .   LAST VISIT :  MAMMO HAS BEEN SCHEDULED. NO HOT FLUSHES OR ANY GYN COMPLAINTS. NO PAO, VAGINAL DRYNESS TODAY  LAST VISIT 2013:  WORKS NIGHTS AT [facility]. NO HOT FLUSHES BUT SOME VAG DRYNESS - DISCUSSED LUBRICANTS AND MOISTURIZERS  HAS REFERRAL FOR MAMMO, LATER TODAY    Past Medical History:   Diagnosis Date    HTN (hypertension)     Obesity, Class II, BMI 35-39.9, with comorbidity 2016    Vitamin D deficiency disease 3/24/2015       Past Surgical History:   Procedure Laterality Date    CARDIAC SURGERY      Age 3- murmur    COLONOSCOPY N/A 10/19/2018    Procedure: COLONOSCOPY;  Surgeon: Cali De Jesus MD;  Location: [facility] ([address] [place] [address]);  Service: Endoscopy;  Laterality: N/A;    COLONOSCOPY N/A 10/19/2018    Performed by Cali De Jesus MD at [facility] ([address] [place] [address])    HYSTERECTOMY      [facility] WITHOUT BSO       MEDICATIONS AND ALLERGIES:      Current Outpatient Medications:     amLODIPine (NORVASC) 10 MG tablet, Take 1 tablet (10 mg total) by mouth once daily., Disp: 90 tablet, Rfl: 3    aspirin (ECOTRIN) 81 MG EC tablet, Take 1 tablet (81 mg total) by mouth once daily., Disp: 30 tablet, Rfl: 12    atorvastatin (LIPITOR) 20 MG tablet, Take 1 tablet (20 mg total) by mouth once daily., Disp: 90 tablet,  Rfl: 3    blood sugar diagnostic (ONE TOUCH ULTRA TEST) Strp, 1 each by Misc.(Non-Drug; Combo Route) route daily as needed., Disp: 35 strip, Rfl: 6    ergocalciferol (ERGOCALCIFEROL) 50,000 unit Cap, Take 1 capsule (50,000 Units total) by mouth every 7 days., Disp: 12 capsule, Rfl: 3    lancets Misc, 1 each by Misc.(Non-Drug; Combo Route) route once daily., Disp: 100 each, Rfl: 3    losartan (COZAAR) 100 MG tablet, Take 1 tablet (100 mg total) by mouth once daily., Disp: 90 tablet, Rfl: 3    phentermine (ADIPEX-P) 37.5 mg tablet, Take 1 tablet (37.5 mg total) by mouth before breakfast., Disp: 30 tablet, Rfl: 0    nitrofurantoin, macrocrystal-monohydrate, (MACROBID) 100 MG capsule, Take 1 capsule (100 mg total) by mouth 2 (two) times daily. for 7 days, Disp: 14 capsule, Rfl: 0    phenazopyridine (PYRIDIUM) 200 MG tablet, Take 1 tablet (200 mg total) by mouth 3 (three) times daily as needed for Pain., Disp: 20 tablet, Rfl: 0    Review of patient's allergies indicates:   Allergen Reactions    Ace inhibitors      Other reaction(s): cough       Family History   Problem Relation Age of Onset    Diabetes Father     COPD Father     Hypertension Father     Hypertension Mother     No Known Problems Brother     No Known Problems Daughter     No Known Problems Daughter     Hypertension Brother     No Known Problems Brother     Glaucoma Neg Hx     Breast cancer Neg Hx     Colon cancer Neg Hx     Ovarian cancer Neg Hx        Social History     Socioeconomic History    Marital status:      Spouse name: Not on file    Number of children: 2    Years of education: Not on file    Highest education level: Not on file   Social Needs    Financial resource strain: Not on file    Food insecurity - worry: Not on file    Food insecurity - inability: Not on file    Transportation needs - medical: Not on file    Transportation needs - non-medical: Not on file   Occupational History    Not on file   Tobacco  "Use    Smoking status: Never Smoker    Smokeless tobacco: Never Used   Substance and Sexual Activity    Alcohol use: Yes     Comment: ocassionally    Drug use: No    Sexual activity: Yes     Partners: Male     Birth control/protection: None   Other Topics Concern    Not on file   Social History Narrative    Not on file       COMPREHENSIVE GYN HISTORY:  PAP History: Denies abnormal Paps except as noted above.  Infection History: Denies STDs. Denies PID.  Benign History: Denies uterine fibroids. Denies ovarian cysts. Denies endometriosis. Denies other conditions.  Cancer History: Denies cervical cancer. Denies uterine cancer or hyperplasia. Denies ovarian cancer. Denies vulvar cancer or pre-cancer. Denies vaginal cancer or pre-cancer. Denies breast cancer. Denies colon cancer.  Menstrual History: Denies menses.     ROS:  GENERAL: No weight changes. No swelling. No fatigue. No fever.  CARDIOVASCULAR: No chest pain. No shortness of breath. No leg cramps.   NEUROLOGICAL: No headaches. No vision changes.  BREASTS: No pain. No lumps. No discharge.  ABDOMEN: No pain. No nausea. No vomiting. No diarrhea. No constipation.  REPRODUCTIVE: No abnormal bleeding.  VULVA: No pain. No lesions. No itching.  VAGINA: No relaxation. No itching. No odor. No discharge. No lesions.  URINARY: No incontinence. No nocturia. No frequency. No dysuria.    /80   Ht 4' 11" (1.499 m)   Wt 87 kg (191 lb 12.8 oz)   BMI 38.74 kg/m²     PE:  APPEARANCE: Well nourished, well developed, in no acute distress.  AFFECT: WNL, alert and oriented x 3.  SKIN: No acne or hirsutism.  NECK: Neck symmetric without masses or thyromegaly.  NODES: No inguinal, cervical, axillary or femoral lymph node enlargement.  CHEST: Good respiratory effort.   ABDOMEN: Soft. No tenderness or masses. No hepatosplenomegaly. No hernias.  BREASTS: Symmetrical, no skin changes or visible lesions. No palpable masses, nipple discharge bilaterally.  PELVIC: ATROPHIC " EXTERNAL FEMALE GENITALIA without lesions. Normal hair distribution. Adequate perineal body, normal urethral meatus. VAGINA DRY without lesions or discharge. No significant cystocele or rectocele. Bimanual exam shows CERVIX and UTERUS to be SURGICALLY ABSENT. Adnexa without masses or tenderness.  EXTREMITIES: No edema.    DIAGNOSIS:  1. Visit for gynecologic examination     2. Menopausal and perimenopausal disorder     3. Dysuria  Urine culture       LABS AND TESTS ORDERED:  Mammogram    MEDICATIONS PRESCRIBED:    COUNSELING:  The patient was counseled today on osteoporosis prevention, calcium supplementation, and regular weight bearing exercise. The patient was also counseled today on ACS PAP guidelines, with recommendations for yearly pelvic exams unless their uterus, cervix, and ovaries were removed for benign reasons; in that case, examinations every 3-5 years are recommended.  The patient was also counseled regarding monthly breast self-examination, routine STD screening for at-risk populations, prophylactic immunizations for transmitted infections such as  HPV, Pertussis, or Influenza as appropriate, and yearly mammograms when indicated by ACS guidelines.  She was advised to see her primary care physician for all other health maintenance.  FOLLOW-UP with me for next routine visit.

## 2018-11-12 ENCOUNTER — PATIENT MESSAGE (OUTPATIENT)
Dept: OBSTETRICS AND GYNECOLOGY | Facility: CLINIC | Age: 53
End: 2018-11-12

## 2018-11-12 RX ORDER — SULFAMETHOXAZOLE AND TRIMETHOPRIM 400; 80 MG/1; MG/1
1 TABLET ORAL 2 TIMES DAILY
Qty: 14 TABLET | Refills: 0 | Status: SHIPPED | OUTPATIENT
Start: 2018-11-12 | End: 2018-11-19

## 2018-11-12 NOTE — TELEPHONE ENCOUNTER
Preliminary results of your urine culture became available this morning, and it looks like the symptoms that you've been experiencing may be due to a bacteria that is not sensitive to Macrobid.  You may feel temporarily better because most antibiotics can suppress bacteria, if not completely eliminating the bacteria.  A change in antibiotic should offer better effect versus this bacteria - I'll send a script for Bactrim to your pharmacy on record.  Please contact me at the office if you have any questions or concerns about this or the antibiotic.

## 2018-11-13 LAB — BACTERIA UR CULT: NORMAL

## 2018-11-21 ENCOUNTER — PATIENT MESSAGE (OUTPATIENT)
Dept: OBSTETRICS AND GYNECOLOGY | Facility: CLINIC | Age: 53
End: 2018-11-21

## 2019-01-24 ENCOUNTER — OFFICE VISIT (OUTPATIENT)
Dept: URGENT CARE | Facility: CLINIC | Age: 54
End: 2019-01-24
Payer: COMMERCIAL

## 2019-01-24 VITALS
OXYGEN SATURATION: 99 % | HEIGHT: 59 IN | WEIGHT: 191 LBS | TEMPERATURE: 97 F | SYSTOLIC BLOOD PRESSURE: 144 MMHG | DIASTOLIC BLOOD PRESSURE: 80 MMHG | BODY MASS INDEX: 38.51 KG/M2 | HEART RATE: 78 BPM

## 2019-01-24 DIAGNOSIS — M54.2 NECK PAIN ON RIGHT SIDE: ICD-10-CM

## 2019-01-24 DIAGNOSIS — M54.12 CERVICAL RADICULOPATHY: Primary | ICD-10-CM

## 2019-01-24 LAB
LEFT EYE DM RETINOPATHY: NEGATIVE
RIGHT EYE DM RETINOPATHY: NEGATIVE

## 2019-01-24 PROCEDURE — 3008F PR BODY MASS INDEX (BMI) DOCUMENTED: ICD-10-PCS | Mod: CPTII,S$GLB,, | Performed by: NURSE PRACTITIONER

## 2019-01-24 PROCEDURE — 3079F DIAST BP 80-89 MM HG: CPT | Mod: CPTII,S$GLB,, | Performed by: NURSE PRACTITIONER

## 2019-01-24 PROCEDURE — 99214 PR OFFICE/OUTPT VISIT, EST, LEVL IV, 30-39 MIN: ICD-10-PCS | Mod: 25,S$GLB,, | Performed by: NURSE PRACTITIONER

## 2019-01-24 PROCEDURE — 3079F PR MOST RECENT DIASTOLIC BLOOD PRESSURE 80-89 MM HG: ICD-10-PCS | Mod: CPTII,S$GLB,, | Performed by: NURSE PRACTITIONER

## 2019-01-24 PROCEDURE — 99214 OFFICE O/P EST MOD 30 MIN: CPT | Mod: 25,S$GLB,, | Performed by: NURSE PRACTITIONER

## 2019-01-24 PROCEDURE — 96372 THER/PROPH/DIAG INJ SC/IM: CPT | Mod: S$GLB,,, | Performed by: NURSE PRACTITIONER

## 2019-01-24 PROCEDURE — 3008F BODY MASS INDEX DOCD: CPT | Mod: CPTII,S$GLB,, | Performed by: NURSE PRACTITIONER

## 2019-01-24 PROCEDURE — 3077F PR MOST RECENT SYSTOLIC BLOOD PRESSURE >= 140 MM HG: ICD-10-PCS | Mod: CPTII,S$GLB,, | Performed by: NURSE PRACTITIONER

## 2019-01-24 PROCEDURE — 96372 PR INJECTION,THERAP/PROPH/DIAG2ST, IM OR SUBCUT: ICD-10-PCS | Mod: S$GLB,,, | Performed by: NURSE PRACTITIONER

## 2019-01-24 PROCEDURE — 3077F SYST BP >= 140 MM HG: CPT | Mod: CPTII,S$GLB,, | Performed by: NURSE PRACTITIONER

## 2019-01-24 RX ORDER — BETAMETHASONE SODIUM PHOSPHATE AND BETAMETHASONE ACETATE 3; 3 MG/ML; MG/ML
6 INJECTION, SUSPENSION INTRA-ARTICULAR; INTRALESIONAL; INTRAMUSCULAR; SOFT TISSUE
Status: COMPLETED | OUTPATIENT
Start: 2019-01-24 | End: 2019-01-24

## 2019-01-24 RX ORDER — ETODOLAC 400 MG/1
400 TABLET, FILM COATED ORAL 2 TIMES DAILY
Qty: 30 TABLET | Refills: 0 | Status: SHIPPED | OUTPATIENT
Start: 2019-01-24 | End: 2019-02-05 | Stop reason: ALTCHOICE

## 2019-01-24 RX ORDER — CYCLOBENZAPRINE HCL 5 MG
5 TABLET ORAL 3 TIMES DAILY PRN
Qty: 30 TABLET | Refills: 0 | Status: SHIPPED | OUTPATIENT
Start: 2019-01-24 | End: 2019-02-03

## 2019-01-24 RX ADMIN — BETAMETHASONE SODIUM PHOSPHATE AND BETAMETHASONE ACETATE 6 MG: 3; 3 INJECTION, SUSPENSION INTRA-ARTICULAR; INTRALESIONAL; INTRAMUSCULAR; SOFT TISSUE at 07:01

## 2019-01-25 NOTE — PROGRESS NOTES
"Subjective:       Patient ID: Amanda Ponce is a 53 y.o. female.    Vitals:  height is 4' 11" (1.499 m) and weight is 86.6 kg (191 lb). Her temperature is 97.2 °F (36.2 °C). Her blood pressure is 144/80 (abnormal) and her pulse is 78. Her oxygen saturation is 99%.     Chief Complaint: Neck Pain    Pt reports having neck pain for a week that radiates to her right shoulder, she describes spasms and shooting pain       Neck Pain    This is a new problem. The current episode started in the past 7 days. The problem occurs constantly. Associated with: movement  The pain is at a severity of 10/10. The pain is severe. The symptoms are aggravated by bending, position and twisting (walking). Stiffness is present at night. Pertinent negatives include no chest pain, fever, headaches or weakness. She has tried ice and acetaminophen (icy hot ) for the symptoms. The treatment provided mild relief.       Constitution: Negative for chills, fatigue and fever.   HENT: Negative for congestion and sore throat.    Neck: Positive for neck pain. Negative for painful lymph nodes.   Cardiovascular: Negative for chest pain and leg swelling.   Eyes: Negative for double vision and blurred vision.   Respiratory: Negative for cough and shortness of breath.    Gastrointestinal: Negative for nausea, vomiting and diarrhea.   Genitourinary: Negative for dysuria, frequency, urgency and history of kidney stones.   Musculoskeletal: Positive for pain. Negative for joint pain, joint swelling, muscle cramps and muscle ache.   Skin: Negative for color change, pale, rash and bruising.   Allergic/Immunologic: Negative for seasonal allergies.   Neurological: Negative for dizziness, history of vertigo, light-headedness, passing out and headaches.   Hematologic/Lymphatic: Negative for swollen lymph nodes.   Psychiatric/Behavioral: Negative for nervous/anxious, sleep disturbance and depression. The patient is not nervous/anxious.        Objective:      Physical " Exam   Constitutional: She is oriented to person, place, and time. She appears well-developed and well-nourished. She is cooperative.  Non-toxic appearance. She does not appear ill. No distress.   HENT:   Head: Normocephalic and atraumatic.   Right Ear: Hearing, tympanic membrane and ear canal normal.   Left Ear: Hearing, tympanic membrane and ear canal normal.   Nose: No mucosal edema, rhinorrhea or nasal deformity. No epistaxis. Right sinus exhibits no maxillary sinus tenderness and no frontal sinus tenderness. Left sinus exhibits no maxillary sinus tenderness and no frontal sinus tenderness.   Mouth/Throat: Uvula is midline and mucous membranes are normal. No trismus in the jaw. Normal dentition. No uvula swelling. No posterior oropharyngeal erythema.   Eyes: Conjunctivae and lids are normal. Right eye exhibits no discharge. Left eye exhibits no discharge. No scleral icterus.   Sclera clear bilat   Neck: Trachea normal and phonation normal. Neck supple. Muscular tenderness present. Edema and decreased range of motion present.       Cardiovascular: Normal rate, regular rhythm, normal heart sounds, intact distal pulses and normal pulses.   Pulmonary/Chest: Effort normal and breath sounds normal. No respiratory distress.   Abdominal: Soft. Normal appearance and bowel sounds are normal. She exhibits no distension, no pulsatile midline mass and no mass. There is no tenderness.   Musculoskeletal: She exhibits no edema or deformity.   Neurological: She is alert and oriented to person, place, and time. She exhibits normal muscle tone. Coordination normal.   Skin: Skin is warm, dry and intact. She is not diaphoretic. No pallor.   Psychiatric: She has a normal mood and affect. Her speech is normal and behavior is normal. Judgment and thought content normal. Cognition and memory are normal.   Nursing note and vitals reviewed.      Assessment:       1. Cervical radiculopathy    2. Neck pain on right side        Plan:        Patient Instructions     Pinched Nerve in the Neck  A pinched nerve in the neck (cervical radiculopathy) is caused when the nerve that goes from the spinal cord to the neck or arm is irritated or has pressure on it. This may be caused by a bulging spinal disk. A spinal disk is the cushion between each spinal bone. Or it may be caused by a narrowing of the spinal joint because of osteoarthritis and wear and tear from repeated injuries.  A pinched nerve can cause numbness, tingling, deep aching, or electrical shooting pain from the side of the neck all the way down to the fingers on one side.  A pinched nerve may start after a sudden turning or bending force (such as in a car accident) or after a simple awkward movement. In either case, muscle spasm is commonly present and adds to the pain.  Home care  Follow these guidelines when caring for yourself at home:  · Rest and relax the muscles. Use a comfortable pillow that supports your head and keeps your spine in a natural (neutral) position. Your head shouldnt be tilted forward or backward. A rolled-up towel may help for a custom fit. When standing or sitting, keep your neck in line with your body. Keep your head up and shoulders down. Stay away from activities that require you to move your neck a lot.  · You can use heat and massage to help ease the pain. Take a hot shower or bath, or use a heating pad. You can also use a cold pack for relief. You can make a cold pack by wrapping a plastic bag of crushed or cubed ice in a thin towel. Try both heat and cold, and use the method that feels best. Do this for 20 minutes several times a day.  · You may use acetaminophen or ibuprofen to control pain, unless another pain medicine was prescribed. If you have chronic liver or kidney disease, talk with your healthcare provider before using these medicines. Also talk with your provider if youve had a stomach ulcer or gastrointestinal bleeding.  · Reduce stress. Stress can  make it longer for your pain to go away.  · Do any exercises or stretches that were given to you as part of your discharge plan.  · Wear a soft collar, if prescribed.  · Physical therapy and massages are known to help.  · You may need surgery for a more serious injury.  Follow-up care  Follow up with your healthcare provider, or as advised, if you dont start to get better after 1 week. You may need more tests. Tell your provider about any fever, chills, or weight loss.  If X-rays were taken, a radiologist may look at them. You will be told of any new findings that may affect your care.  When to seek medical advice  Call your healthcare provider right away if any of these occur:  · Pain becomes worse even after taking prescribed pain medicine  · Weakness in the arm or legs  · Numbness in the arm gets worse  · Trouble breathing or swallowing  Date Last Reviewed: 5/1/2017  © 2133-8297 [employer] [address] [place] [address]. All rights reserved. This information is not intended as a substitute for professional medical care. Always follow your healthcare professional's instructions.            Cervical radiculopathy    Neck pain on right side    Other orders  -     betamethasone acetate-betamethasone sodium phosphate injection 6 mg  -     etodolac (LODINE) 400 MG tablet; Take 1 tablet (400 mg total) by mouth 2 (two) times daily.  Dispense: 30 tablet; Refill: 0  -     cyclobenzaprine (FLEXERIL) 5 MG tablet; Take 1 tablet (5 mg total) by mouth 3 (three) times daily as needed for Muscle spasms.  Dispense: 30 tablet; Refill: 0

## 2019-01-25 NOTE — PATIENT INSTRUCTIONS
Pinched Nerve in the Neck  A pinched nerve in the neck (cervical radiculopathy) is caused when the nerve that goes from the spinal cord to the neck or arm is irritated or has pressure on it. This may be caused by a bulging spinal disk. A spinal disk is the cushion between each spinal bone. Or it may be caused by a narrowing of the spinal joint because of osteoarthritis and wear and tear from repeated injuries.  A pinched nerve can cause numbness, tingling, deep aching, or electrical shooting pain from the side of the neck all the way down to the fingers on one side.  A pinched nerve may start after a sudden turning or bending force (such as in a car accident) or after a simple awkward movement. In either case, muscle spasm is commonly present and adds to the pain.  Home care  Follow these guidelines when caring for yourself at home:  · Rest and relax the muscles. Use a comfortable pillow that supports your head and keeps your spine in a natural (neutral) position. Your head shouldnt be tilted forward or backward. A rolled-up towel may help for a custom fit. When standing or sitting, keep your neck in line with your body. Keep your head up and shoulders down. Stay away from activities that require you to move your neck a lot.  · You can use heat and massage to help ease the pain. Take a hot shower or bath, or use a heating pad. You can also use a cold pack for relief. You can make a cold pack by wrapping a plastic bag of crushed or cubed ice in a thin towel. Try both heat and cold, and use the method that feels best. Do this for 20 minutes several times a day.  · You may use acetaminophen or ibuprofen to control pain, unless another pain medicine was prescribed. If you have chronic liver or kidney disease, talk with your healthcare provider before using these medicines. Also talk with your provider if youve had a stomach ulcer or gastrointestinal bleeding.  · Reduce stress. Stress can make it longer for your pain  to go away.  · Do any exercises or stretches that were given to you as part of your discharge plan.  · Wear a soft collar, if prescribed.  · Physical therapy and massages are known to help.  · You may need surgery for a more serious injury.  Follow-up care  Follow up with your healthcare provider, or as advised, if you dont start to get better after 1 week. You may need more tests. Tell your provider about any fever, chills, or weight loss.  If X-rays were taken, a radiologist may look at them. You will be told of any new findings that may affect your care.  When to seek medical advice  Call your healthcare provider right away if any of these occur:  · Pain becomes worse even after taking prescribed pain medicine  · Weakness in the arm or legs  · Numbness in the arm gets worse  · Trouble breathing or swallowing  Date Last Reviewed: 5/1/2017  © 0568-5870 [employer] [address] [place] [address], [place] [address]. All rights reserved. This information is not intended as a substitute for professional medical care. Always follow your healthcare professional's instructions.

## 2019-02-05 ENCOUNTER — OFFICE VISIT (OUTPATIENT)
Dept: INTERNAL MEDICINE | Facility: CLINIC | Age: 54
End: 2019-02-05
Payer: COMMERCIAL

## 2019-02-05 VITALS
HEART RATE: 121 BPM | OXYGEN SATURATION: 99 % | SYSTOLIC BLOOD PRESSURE: 136 MMHG | HEIGHT: 59 IN | TEMPERATURE: 99 F | BODY MASS INDEX: 39.92 KG/M2 | DIASTOLIC BLOOD PRESSURE: 92 MMHG | WEIGHT: 198 LBS

## 2019-02-05 DIAGNOSIS — M54.12 CERVICAL RADICULOPATHY: Primary | ICD-10-CM

## 2019-02-05 DIAGNOSIS — M62.838 MUSCLE SPASM: ICD-10-CM

## 2019-02-05 DIAGNOSIS — M25.511 ACUTE PAIN OF RIGHT SHOULDER: ICD-10-CM

## 2019-02-05 PROCEDURE — 3008F BODY MASS INDEX DOCD: CPT | Mod: CPTII,S$GLB,, | Performed by: NURSE PRACTITIONER

## 2019-02-05 PROCEDURE — 3080F PR MOST RECENT DIASTOLIC BLOOD PRESSURE >= 90 MM HG: ICD-10-PCS | Mod: CPTII,S$GLB,, | Performed by: NURSE PRACTITIONER

## 2019-02-05 PROCEDURE — 99999 PR PBB SHADOW E&M-EST. PATIENT-LVL V: CPT | Mod: PBBFAC,,, | Performed by: NURSE PRACTITIONER

## 2019-02-05 PROCEDURE — 99214 PR OFFICE/OUTPT VISIT, EST, LEVL IV, 30-39 MIN: ICD-10-PCS | Mod: 25,S$GLB,, | Performed by: NURSE PRACTITIONER

## 2019-02-05 PROCEDURE — 96372 THER/PROPH/DIAG INJ SC/IM: CPT | Mod: S$GLB,,, | Performed by: NURSE PRACTITIONER

## 2019-02-05 PROCEDURE — 99214 OFFICE O/P EST MOD 30 MIN: CPT | Mod: 25,S$GLB,, | Performed by: NURSE PRACTITIONER

## 2019-02-05 PROCEDURE — 3008F PR BODY MASS INDEX (BMI) DOCUMENTED: ICD-10-PCS | Mod: CPTII,S$GLB,, | Performed by: NURSE PRACTITIONER

## 2019-02-05 PROCEDURE — 3080F DIAST BP >= 90 MM HG: CPT | Mod: CPTII,S$GLB,, | Performed by: NURSE PRACTITIONER

## 2019-02-05 PROCEDURE — 3075F SYST BP GE 130 - 139MM HG: CPT | Mod: CPTII,S$GLB,, | Performed by: NURSE PRACTITIONER

## 2019-02-05 PROCEDURE — 3075F PR MOST RECENT SYSTOLIC BLOOD PRESS GE 130-139MM HG: ICD-10-PCS | Mod: CPTII,S$GLB,, | Performed by: NURSE PRACTITIONER

## 2019-02-05 PROCEDURE — 99999 PR PBB SHADOW E&M-EST. PATIENT-LVL V: ICD-10-PCS | Mod: PBBFAC,,, | Performed by: NURSE PRACTITIONER

## 2019-02-05 PROCEDURE — 96372 PR INJECTION,THERAP/PROPH/DIAG2ST, IM OR SUBCUT: ICD-10-PCS | Mod: S$GLB,,, | Performed by: NURSE PRACTITIONER

## 2019-02-05 RX ORDER — GABAPENTIN 300 MG/1
300 CAPSULE ORAL 3 TIMES DAILY
Qty: 90 CAPSULE | Refills: 11 | Status: SHIPPED | OUTPATIENT
Start: 2019-02-05 | End: 2019-02-05 | Stop reason: ALTCHOICE

## 2019-02-05 RX ORDER — MELOXICAM 15 MG/1
15 TABLET ORAL DAILY
Qty: 30 TABLET | Refills: 0 | Status: SHIPPED | OUTPATIENT
Start: 2019-02-05 | End: 2019-11-29 | Stop reason: SDUPTHER

## 2019-02-05 RX ORDER — METHOCARBAMOL 750 MG/1
750 TABLET, FILM COATED ORAL 4 TIMES DAILY
Qty: 40 TABLET | Refills: 0 | Status: SHIPPED | OUTPATIENT
Start: 2019-02-05 | End: 2019-02-15

## 2019-02-05 RX ORDER — KETOROLAC TROMETHAMINE 30 MG/ML
60 INJECTION, SOLUTION INTRAMUSCULAR; INTRAVENOUS
Status: COMPLETED | OUTPATIENT
Start: 2019-02-05 | End: 2019-02-05

## 2019-02-05 RX ADMIN — KETOROLAC TROMETHAMINE 60 MG: 30 INJECTION, SOLUTION INTRAMUSCULAR; INTRAVENOUS at 12:02

## 2019-02-05 NOTE — PROGRESS NOTES
Subjective:       Patient ID: Amanda Ponce is a 53 y.o. female.    Chief Complaint: Neck Pain and Shoulder Pain    Disclaimer: This note has been generated using voice-recognition software. There may be typographical errors that have been missed during proof-reading  Pt of Dr Sullivan here for same-day visit for right shoulder pain. Patient seen in the Urgent Care on the 29th for neck and shoulder pain and given Lodine steroid shot and Flexeril.  Patient states her neck is much better she can rotate it left-to-right at this time.  But she states now that her shoulder is hurting anteriorly and posteriorly and patient is unable to raise her arm.  Patient denies any injury.  Patient states she  woke up with this 2-3  days ago.  Patient is right-handed.  Patient denies any trauma.      Neck Pain    Pertinent negatives include no chest pain or fever.   Shoulder Pain    Pertinent negatives include no fever.     Review of Systems   Constitutional: Negative for activity change, appetite change, fatigue and fever.   Respiratory: Negative for cough and shortness of breath.    Cardiovascular: Negative for chest pain.   Musculoskeletal: Positive for arthralgias and neck pain.   Psychiatric/Behavioral: Negative for sleep disturbance.         Past Medical History:   Diagnosis Date    HTN (hypertension)     Obesity, Class II, BMI 35-39.9, with comorbidity 12/19/2016    Vitamin D deficiency disease 3/24/2015     Past Surgical History:   Procedure Laterality Date    CARDIAC SURGERY      Age 3- murmur    COLONOSCOPY N/A 10/19/2018    Performed by Cali De Jesus MD at [facility] (4TH FLR)    HYSTERECTOMY      [facility] WITHOUT BSO     Social History     Social History Narrative    Not on file     Family History   Problem Relation Age of Onset    Diabetes Father     COPD Father     Hypertension Father     Hypertension Mother     No Known Problems Brother     No Known Problems Daughter     No Known Problems Daughter      Hypertension Brother     No Known Problems Brother     Glaucoma Neg Hx     Breast cancer Neg Hx     Colon cancer Neg Hx     Ovarian cancer Neg Hx      Outpatient Encounter Medications as of 2/5/2019   Medication Sig Dispense Refill    amLODIPine (NORVASC) 10 MG tablet Take 1 tablet (10 mg total) by mouth once daily. 90 tablet 3    aspirin (ECOTRIN) 81 MG EC tablet Take 1 tablet (81 mg total) by mouth once daily. 30 tablet 12    blood sugar diagnostic (ONE TOUCH ULTRA TEST) Strp 1 each by Misc.(Non-Drug; Combo Route) route daily as needed. 35 strip 6    ergocalciferol (ERGOCALCIFEROL) 50,000 unit Cap Take 1 capsule (50,000 Units total) by mouth every 7 days. 12 capsule 3    lancets Misc 1 each by Misc.(Non-Drug; Combo Route) route once daily. 100 each 3    losartan (COZAAR) 100 MG tablet Take 1 tablet (100 mg total) by mouth once daily. 90 tablet 3    phenazopyridine (PYRIDIUM) 200 MG tablet Take 1 tablet (200 mg total) by mouth 3 (three) times daily as needed for Pain. 20 tablet 0    [DISCONTINUED] etodolac (LODINE) 400 MG tablet Take 1 tablet (400 mg total) by mouth 2 (two) times daily. 30 tablet 0    atorvastatin (LIPITOR) 20 MG tablet Take 1 tablet (20 mg total) by mouth once daily. 90 tablet 3    meloxicam (MOBIC) 15 MG tablet Take 1 tablet (15 mg total) by mouth once daily. 30 tablet 0    methocarbamol (ROBAXIN) 750 MG Tab Take 1 tablet (750 mg total) by mouth 4 (four) times daily. for 10 days 40 tablet 0    [DISCONTINUED] gabapentin (NEURONTIN) 300 MG capsule Take 1 capsule (300 mg total) by mouth 3 (three) times daily. 90 capsule 11     Facility-Administered Encounter Medications as of 2/5/2019   Medication Dose Route Frequency Provider Last Rate Last Dose    ketorolac injection 60 mg  60 mg Intramuscular 1 time in Clinic/[facility]         Last 3 sets of Vitals  Vitals - 1 value per visit 11/8/2018 1/24/2019 2/5/2019   SYSTOLIC 126 144 136   DIASTOLIC 80 80 92   PULSE -  "78 121   TEMPERATURE - 97.2 99   RESPIRATIONS - - -   SPO2 - 99 99   Weight (lb) 191.8 191 197.97   Weight (kg) 87 86.637 89.8   HEIGHT 4' 11" 4' 11" 4' 11"   BODY MASS INDEX 38.74 38.58 39.99   VISIT REPORT - - -   Pain Score  0 10 10         Objective:      Physical Exam   Constitutional: She appears well-developed and well-nourished. No distress.   Pulmonary/Chest: Effort normal.   Musculoskeletal: She exhibits tenderness.        Right shoulder: She exhibits decreased range of motion, tenderness, pain and spasm. She exhibits no deformity, no laceration, normal pulse and normal strength.        Arms:  TTP over anterior and posterior shoulder muscles on right  Cap RF brisk  Motor mvmt and sensation intact distally     Skin: She is not diaphoretic.   Nursing note and vitals reviewed.          Lab Results   Component Value Date    WBC 5.26 02/16/2016    RBC 4.51 02/16/2016    HGB 12.9 02/16/2016    HCT 39.3 02/16/2016    MCV 87 02/16/2016    MCH 28.6 02/16/2016    MCHC 32.8 02/16/2016    RDW 14.2 02/16/2016     (H) 02/16/2016    MPV 10.2 02/16/2016    GRAN 5.3 05/13/2011    GRAN 58.3 05/13/2011    LYMPH 30.2 05/13/2011    LYMPH 2.7 05/13/2011    MONO 7.4 05/13/2011    MONO 0.7 05/13/2011    EOS 0.4 05/13/2011    BASO 0.0 05/13/2011    EOSINOPHIL 4.0 05/13/2011    BASOPHIL 0.1 05/13/2011     Lab Results   Component Value Date    WBC 5.26 02/16/2016    HGB 12.9 02/16/2016    HCT 39.3 02/16/2016     (H) 02/16/2016    CHOL 200 (H) 10/22/2018    TRIG 111 10/22/2018    HDL 53 10/22/2018    ALT 25 10/22/2018    AST 26 10/22/2018     10/22/2018    K 4.1 10/22/2018     10/22/2018    CREATININE 0.8 10/22/2018    BUN 21 (H) 10/22/2018    CO2 28 10/22/2018    TSH 2.829 03/23/2015    HGBA1C 6.2 (H) 10/22/2018       Assessment:       1. Cervical radiculopathy    2. Acute pain of right shoulder    3. Muscle spasm        Plan:           Amanda was seen today for neck pain and shoulder pain.    Diagnoses and " all orders for this visit:    Cervical radiculopathy  -     Cancel: X-Ray Cervical Spine Complete 5 view; Future  -     Ambulatory Referral to Physical/Occupational Therapy  -     Cancel: Ambulatory consult to Pain Clinic  -     Discontinue: gabapentin (NEURONTIN) 300 MG capsule; Take 1 capsule (300 mg total) by mouth 3 (three) times daily.    Acute pain of right shoulder  -     Ambulatory Referral to Physical/Occupational Therapy  -     ketorolac injection 60 mg  -     methocarbamol (ROBAXIN) 750 MG Tab; Take 1 tablet (750 mg total) by mouth 4 (four) times daily. for 10 days  -     meloxicam (MOBIC) 15 MG tablet; Take 1 tablet (15 mg total) by mouth once daily.    Muscle spasm  -     Ambulatory Referral to Physical/Occupational Therapy  -     ketorolac injection 60 mg  -     methocarbamol (ROBAXIN) 750 MG Tab; Take 1 tablet (750 mg total) by mouth 4 (four) times daily. for 10 days  -     meloxicam (MOBIC) 15 MG tablet; Take 1 tablet (15 mg total) by mouth once daily.      Patient Instructions     Mobic once a day for pain and inflammation    Robaxin 4 times a day if needed    Warm, moist heat to shoulder    Physical therapy will call you to schedule      Muscle Spasm  A muscle spasm (also called a cramp) is an involuntary muscle contraction. The muscle tightens quickly and strongly. A hard lump may form in the muscle. Muscle spasms are very painful. Read on to learn more about muscle spasms and how to treat and prevent them.    What causes muscles to spasm?  Often, the cause of a muscle spasm is not known. Muscle spasm is due to irritation of muscle fibers. Some things can make a muscle spasm more likely. These include:  · Injury  · Heavy exercise  · Overtired muscles  · A muscle held in one position for a long time  · Dehydration  · Low levels of certain minerals in the body  · Taking certain medications, such as diuretics or water pills  · Certain medical conditions, such as kidney failure or diabetes  · Being  pregnant  Stopping a muscle spasm  Muscle spasms often come and go quickly. When a muscle goes into spasm, very gently stretch and massage the muscle. This may help calm the muscle fibers. Then rest the muscle.  Preventing muscle spasms  Although there is little or no evidence that staying hydrated, taking certain vitamins or minerals or stretching works to prevent cramps, these measures may help and have other benefits. Talk to your health care provider about steps to take to avoid muscle spasms. These may include:  · Drinking enough fluids to avoid dehydration, especially when you exercise.  · Taking vitamin or mineral supplements.  · Getting regular exercise.  · Stretching regularly, especially before exercise.  · Limit caffeine and smoking.  · Taking a prescription muscle relaxant.  When to call your doctor  Call your doctor if you have any of the following:  · Severe cramping  · Cramping that lasts a long time, does not go away with stretching, or keeps coming back  · Pain, tingling, or weakness in the arms or legs  · Pain that wakes you up at night   Date Last Reviewed: 9/1/2015  © 7526-0470 [employer]. [address] [place] [address], [place] [address]. All rights reserved. This information is not intended as a substitute for professional medical care. Always follow your healthcare professional's instructions.

## 2019-02-05 NOTE — PATIENT INSTRUCTIONS
Mobic once a day for pain and inflammation    Robaxin 4 times a day if needed    Warm, moist heat to shoulder    Physical therapy will call you to schedule      Muscle Spasm  A muscle spasm (also called a cramp) is an involuntary muscle contraction. The muscle tightens quickly and strongly. A hard lump may form in the muscle. Muscle spasms are very painful. Read on to learn more about muscle spasms and how to treat and prevent them.    What causes muscles to spasm?  Often, the cause of a muscle spasm is not known. Muscle spasm is due to irritation of muscle fibers. Some things can make a muscle spasm more likely. These include:  · Injury  · Heavy exercise  · Overtired muscles  · A muscle held in one position for a long time  · Dehydration  · Low levels of certain minerals in the body  · Taking certain medications, such as diuretics or water pills  · Certain medical conditions, such as kidney failure or diabetes  · Being pregnant  Stopping a muscle spasm  Muscle spasms often come and go quickly. When a muscle goes into spasm, very gently stretch and massage the muscle. This may help calm the muscle fibers. Then rest the muscle.  Preventing muscle spasms  Although there is little or no evidence that staying hydrated, taking certain vitamins or minerals or stretching works to prevent cramps, these measures may help and have other benefits. Talk to your health care provider about steps to take to avoid muscle spasms. These may include:  · Drinking enough fluids to avoid dehydration, especially when you exercise.  · Taking vitamin or mineral supplements.  · Getting regular exercise.  · Stretching regularly, especially before exercise.  · Limit caffeine and smoking.  · Taking a prescription muscle relaxant.  When to call your doctor  Call your doctor if you have any of the following:  · Severe cramping  · Cramping that lasts a long time, does not go away with stretching, or keeps coming back  · Pain, tingling, or weakness  in the arms or legs  · Pain that wakes you up at night   Date Last Reviewed: 9/1/2015  © 3254-2007 [employer]. [address] [place] [address], [place] [address]. All rights reserved. This information is not intended as a substitute for professional medical care. Always follow your healthcare professional's instructions.

## 2019-02-22 ENCOUNTER — CLINICAL SUPPORT (OUTPATIENT)
Dept: REHABILITATION | Facility: HOSPITAL | Age: 54
End: 2019-02-22
Attending: NURSE PRACTITIONER
Payer: COMMERCIAL

## 2019-02-22 DIAGNOSIS — M54.2 NECK PAIN: ICD-10-CM

## 2019-02-22 DIAGNOSIS — M25.511 ACUTE PAIN OF RIGHT SHOULDER: Primary | ICD-10-CM

## 2019-02-22 DIAGNOSIS — M25.619 LIMITED RANGE OF MOTION (ROM) OF SHOULDER: ICD-10-CM

## 2019-02-22 DIAGNOSIS — R29.898 DECREASED ROM OF NECK: ICD-10-CM

## 2019-02-22 PROCEDURE — 97161 PT EVAL LOW COMPLEX 20 MIN: CPT | Mod: PN

## 2019-02-22 NOTE — PROGRESS NOTES
[facility]  Physical Therapy Initial Evaluation      See POC for full evaluation.     TREATMENT     No treatment provided this session.    Home Exercises and Patient Education Provided    Education provided:   Educated pt on importance of proper posture and body mechanics, discussed proper sitting and workspace setup, discussed proper sleeping with emphasis on cervical positioning.    Assessment   Amanda is a 53 y.o. female referred to outpatient Physical Therapy with a medical diagnosis of cervical radiculopathy, shoulder pain, and muscle spasms. Pt presents with c/o R shoulder/neck pain which is most aggravated when resting after activity and at night when trying to sleep. Pt reports having pain in R UT and ant R shoulder with the R UT being primary, reports that UT pain is an aching pain where the pain in ant shoulder is more sharp feeling but also aches at time. Pt demonstrates poor seated and standing posture, dem rounded shoulders, forward head, B scapular protraction, R scapular depression and increased lumbar lordosis. Upon assessment of pt positioning during sleeping, pt demonstrates cervical SB to L when sidelying on L, which stresses UT. PT educated patient on proper posture and sleeping positioning and relation to patients pain. Pt currently demonstrates limited cervical ROM, R shoulder pain, scapular dyskinesia, R GHJ hypermobility, decreased R shoulder strength, and decreased R shoulder ROM. Pt demonstrating s/s of R shoulder impingement 2' to GHJ instability. Pt will benefit from postural education as well as R RC strengthening in order to address pt deficits.     Pt prognosis is Good.   Pt will benefit from skilled outpatient Physical Therapy to address the deficits stated above and in the chart below, provide pt/family education, and to maximize pt's level of independence.     Plan of care discussed with patient: Yes  Pt's spiritual, cultural and educational needs  considered and patient is agreeable to the plan of care and goals as stated below:     Anticipated Barriers for therapy: None    Medical Necessity is demonstrated by the following  History  Co-morbidities and personal factors that may impact the plan of care Co-morbidities:   HTN    Personal Factors:   Significant cervical postural abnormalities     low   Examination  Body Structures and Functions, activity limitations and participation restrictions that may impact the plan of care Body Regions:   neck  upper extremities    Body Systems:    gross symmetry  ROM  strength  gross coordinated movement  motor control    Participation Restrictions:   None    Activity limitations:   Learning and applying knowledge  no deficits    General Tasks and Commands  no deficits    Communication  no deficits    Mobility  lifting and carrying objects    Self care  washing oneself (bathing, drying, washing hands)  caring for body parts (brushing teeth, shaving, grooming)    Domestic Life  shopping  doing house work (cleaning house, washing dishes, laundry)  sleeping    Interactions/Relationships  no deficits    Life Areas  no deficits    Community and Social Life  no deficits         low   Clinical Presentation stable and uncomplicated low   Decision Making/ Complexity Score: low     Goals:  Short Term Goals: 5 weeks   Pt will demonstrate Forrest with initial HEP  Pt will demonstrate awareness of and improved posture with less cues to maintain upright posture in order to decrease pain in improve QOL.  Pt will demonstrate improve LSB and RR cervical ROM by 5' each in order to improve fx mobility and decrease pain.  Pt will demonstrate improve R shoulder abd by 10' in order to improve fx mobility.  Pt will demonstrate reported pain equal to or less than 5/10 at worst in order to improve pt mobility and QOL.      Long Term Goals: 10 weeks   Pt will demonstrate Forrest with initial HEP  Pt will demonstrate awareness of and  maintain upright posture independently in order to decrease pain in improve QOL.  Pt will demonstrate improve LSB and RR cervical ROM by 10' each in order to improve fx mobility and decrease pain.  Pt will demonstrate improve R shoulder abd to WNL in order to improve fx mobility.  Pt will demonstrate reported pain equal to or less than 2/10 at worst in order to improve pt mobility and QOL.    Plan   Plan of care Certification: 2/22/2019 to 4/3/2019    Outpatient Physical Therapy 2 times weekly for 10 weeks to include the following interventions: Cervical/Lumbar Traction, Manual Therapy, Moist Heat/ Ice, Neuromuscular Re-ed, Patient Education, Self Care, Therapeutic Activites and Therapeutic Exercise.     Evaluating PT: Mehul Lincoln, PT    Kerry Henderson, PT

## 2019-02-22 NOTE — PLAN OF CARE
[facility]  Physical Therapy Initial Evaluation    Name: Amanda Ponce  Clinic Number: [id number]    Therapy Diagnosis:   Encounter Diagnoses   Name Primary?    Neck pain     Acute pain of right shoulder Yes    Limited range of motion (ROM) of shoulder     Decreased ROM of neck      Physician: Giovanna Isaac F*    Physician Orders: PT Eval and Treat  Medical Diagnosis from Referral: Cervical radiculopathy, acute pain of right shoulder, muscle spasm  Evaluation Date: 2/22/2019  Authorization Period Expiration: 12/31/2019  Plan of Care Expiration: 4/3/2019  Visit # / Visits authorized: 1/ 20    Time In: 0813  Time Out: 0908  Total Billable Time: 55 minutes    Precautions: Standard    Subjective   Date of onset: 1/22/19    History of current condition - Amanda reports: Pt has been having pain in R neck and shoulder. Pt felt pain initially after a wedding, reports pain was really bad that night when she got home and she could not fall asleep. She tried over the counter analgesics, pain, and ice but nothing was helping. Pt reports feeling a lot of pain in the morning over her R shoulder and was getting muscle spasms. Pt reports pain has been moving initially pain was closer to her neck and has now moved down her shoulder. Pt is taking muscle relaxers and mild pain medication but reports that the pain has continued to persist despite the medication. Pt reports pain is worsened by reaching with RUE and turning her head. Reports it is ttp. Pt is having difficulty sleeping, reports being able to work and do home activities but et a lot of pain afterwards. Pt reports that she sleeps on her side and her stomach normally, lately has just been sleeping on her side.     Medical History:   Past Medical History:   Diagnosis Date    HTN (hypertension)     Obesity, Class II, BMI 35-39.9, with comorbidity 12/19/2016    Vitamin D deficiency disease 3/24/2015       Surgical History:   Amanda  Kris  has a past surgical history that includes Hysterectomy; Cardiac surgery; and Colonoscopy (N/A, 10/19/2018).    Medications:   Amanda has a current medication list which includes the following prescription(s): amlodipine, aspirin, atorvastatin, blood sugar diagnostic, ergocalciferol, lancets, losartan, meloxicam, and phenazopyridine.    Allergies:   Review of patient's allergies indicates:   Allergen Reactions    Ace inhibitors      Other reaction(s): cough        Imaging: None    Prior Therapy:   Social History: Pt enjoys going out dancing, enjoys watching TV and listening to music at home.  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  Prior Level of Function: Pt was pain free and I c all activity  Current Level of Function: Having difficulty sleeping,     Pain:  Current 4/10, worst 10/10, best 2/10   Location: right neck  and shoulder   Description: Aching  Aggravating Factors: Laying and turning head, elevating UEs  Easing Factors: pain medication    Pts goals: Pt wants to be pain-free in her R shoulder    Objective     Observation: pleasant and cooperative    Posture: Forward head./shoulder, rounded shoulder/protracted scapulae, R shoulder depressed.    Cervical Range of Motion:    Degrees Pain   Flexion 19 +     Extension 42 +     Right Side Bending 27 +     Left Side Bending 19 +     Right Rotation 35 +   Left Rotation 52 +      MMT:  RR: 4+/5  LR: empty  RSB: empty  LSB: 5/5     Shoulder Range of Motion:   Shoulder Left Right   Flexion WNL WNL   Abduction    ER WNL WNL   IR WNL 20     Upper Extremity Strength  (R) UE  (L) UE    Shoulder elevation: 5/5 Shoulder elevation: 5/5   Shoulder flexion: 4+/5 Shoulder flexion: 4+/5   Shoulder Abduction: 4+/5 Shoulder abduction: 4+/5   Shoulder ER 5/5 Shoulder ER 5/5   Shoulder IR 4/5 Shoulder IR 4/5   Elbow flexion: 5/5 Elbow flexion: 5/5   Elbow extension: 5/5 Elbow extension: 5/5   Wrist flexion: 5/5 Wrist flexion: 5/5    Wrist extension: 5/5 Wrist extension: 5/5    5/5 : 5/5     Cervical Special Tests:  Distraction -   Compression +   Spurlings -   Sharp-Kalin -   Lateral Flexion Alar Ligament -     Shoulder Special Tests:    Impingement   Right Left   Neer's + -   Sands-Nadeem + -     Rotator Cuff:     Right Left   Drop Arm Test - -   Subscapularis Lift Off Test - -   ER Lag Sign - -   IR Lag Sign - -   Empty Can Test + -   Full Can Test - -     Instability:     Right Left   Sulcus Sign - -   Anterior Apprehension Test + -     Labrum:     Right Left   Longton's Test - -   Clunk Test - -     AC Joint/Biceps:     Right Left   Speed's test + -     Joint Mobility: Hypermobility of B GHJ R>L    Joint Mobility:   Cervical : limited segmental mobility C5-C7 when performing /UPA, no reports of pain    Thoracic mobility: Limited segmental mobility T1-T2 otherwise Thoracic /UPAs painfree and WNL    Palpation: TTP of R UT, AC joint, and clavicle    Sensation: Light touch WNL    Scapular Control/Dyskinesis:    Normal / Subtle / Obvious  Comments    Left  Subtle Limited scapular mobility    Right  Obvious Significant limited scapular mobility upon R shoulder elevation       TREATMENT     No treatment provided this session.    Home Exercises and Patient Education Provided    Education provided:   Educated pt on importance of proper posture and body mechanics, discussed proper sitting and workspace setup, discussed proper sleeping with emphasis on cervical positioning.    Assessment   Amanda is a 53 y.o. female referred to outpatient Physical Therapy with a medical diagnosis of cervical radiculopathy, shoulder pain, and muscle spasms. Pt presents with c/o R shoulder/neck pain which is most aggravated when resting after activity and at night when trying to sleep. Pt reports having pain in R UT and ant R shoulder with the R UT being primary, reports that UT pain is an aching pain where the pain in ant shoulder is more sharp  feeling but also aches at time. Pt demonstrates poor seated and standing posture, dem rounded shoulders, forward head, B scapular protraction, R scapular depression and increased lumbar lordosis. Upon assessment of pt positioning during sleeping, pt demonstrates cervical SB to L when sidelying on L, which stresses UT. PT educated patient on proper posture and sleeping positioning and relation to patients pain. Pt currently demonstrates limited cervical ROM, R shoulder pain, scapular dyskinesia, R GHJ hypermobility, decreased R shoulder strength, and decreased R shoulder ROM. Pt demonstrating s/s of R shoulder impingement 2' to GHJ instability. Pt will benefit from postural education as well as R RC strengthening in order to address pt deficits.     Pt prognosis is Good.   Pt will benefit from skilled outpatient Physical Therapy to address the deficits stated above and in the chart below, provide pt/family education, and to maximize pt's level of independence.     Plan of care discussed with patient: Yes  Pt's spiritual, cultural and educational needs considered and patient is agreeable to the plan of care and goals as stated below:     Anticipated Barriers for therapy: None    Medical Necessity is demonstrated by the following  History  Co-morbidities and personal factors that may impact the plan of care Co-morbidities:   HTN    Personal Factors:   Significant cervical postural abnormalities     low   Examination  Body Structures and Functions, activity limitations and participation restrictions that may impact the plan of care Body Regions:   neck  upper extremities    Body Systems:    gross symmetry  ROM  strength  gross coordinated movement  motor control    Participation Restrictions:   None    Activity limitations:   Learning and applying knowledge  no deficits    General Tasks and Commands  no deficits    Communication  no deficits    Mobility  lifting and carrying objects    Self care  washing oneself  (bathing, drying, washing hands)  caring for body parts (brushing teeth, shaving, grooming)    Domestic Life  shopping  doing house work (cleaning house, washing dishes, laundry)  sleeping    Interactions/Relationships  no deficits    Life Areas  no deficits    Community and Social Life  no deficits         low   Clinical Presentation stable and uncomplicated low   Decision Making/ Complexity Score: low     Goals:  Short Term Goals: 5 weeks   Pt will demonstrate [place] with initial HEP  Pt will demonstrate awareness of and improved posture with less cues to maintain upright posture in order to decrease pain in improve QOL.  Pt will demonstrate improve LSB and RR cervical ROM by 5' each in order to improve fx mobility and decrease pain.  Pt will demonstrate improve R shoulder abd by 10' in order to improve fx mobility.  Pt will demonstrate reported pain equal to or less than 5/10 at worst in order to improve pt mobility and QOL.      Long Term Goals: 10 weeks   Pt will demonstrate [place] with initial HEP  Pt will demonstrate awareness of and maintain upright posture independently in order to decrease pain in improve QOL.  Pt will demonstrate improve LSB and RR cervical ROM by 10' each in order to improve fx mobility and decrease pain.  Pt will demonstrate improve R shoulder abd to WNL in order to improve fx mobility.  Pt will demonstrate reported pain equal to or less than 2/10 at worst in order to improve pt mobility and QOL.    Plan   Plan of care Certification: 2/22/2019 to 4/3/2019    Outpatient Physical Therapy 2 times weekly for 10 weeks to include the following interventions: Cervical/Lumbar Traction, Manual Therapy, Moist Heat/ Ice, Neuromuscular Re-ed, Patient Education, Self Care, Therapeutic Activites and Therapeutic Exercise.     Evaluating PT: Mehul Lincoln, PT    Kerry Henderson, PT

## 2019-02-22 NOTE — PROGRESS NOTES
"  Physical Therapy Daily Treatment Note     Name: Amanda Ponce  Clinic Number: [id number]    Therapy Diagnosis:   Encounter Diagnoses   Name Primary?    Acute pain of right shoulder     Limited range of motion (ROM) of shoulder     Decreased ROM of neck      Physician: Giovanna Isaac F*    Visit Date: 2/25/2019    Physician Orders: PT Eval and Treat  Medical Diagnosis from Referral: Cervical radiculopathy, acute pain of right shoulder, muscle spasm  Evaluation Date: 2/22/2019  Authorization Period Expiration: 12/31/2019  Plan of Care Expiration: 4/3/2019  Visit # / Visits authorized: 2/ 20    Time In: 0701  Time Out: 0801  Total Billable Time: 60 minutes    Precautions: Standard    Subjective     Pt reports: Pt reports that her shoulder is feeling a little better and she has been sleeping better. Reports a decrease in pain and has been. States that she is able to tolerate increase pillows throughout half the night but needs to take one out, has helped her sleep better and for longer duration.    She was compliant with home exercise program.    Response to previous treatment: Pt reports being mildyl sore after last visit but felt better all weekend     Pain: 2/10  Location: right shoulder      Objective     Amanda received therapeutic exercises to develop strength, endurance, ROM, flexibility and posture for 30 minutes including:  UT stretch 2x30"  LS Stretch 3x30"  Pec Stretch 3x30"  Chin Tucks 2x10 with 2sec holds  Rows with red t-band x20  IR with red t-band 2x20  ER with red t-band 2x20  Serratus push ups on wall 2x20    Amanda received the following manual therapy techniques: Joint mobilizations, Soft tissue Mobilization and Friction Massage were applied to the: shoulder/neck/back for 25 minutes, including:  STM/trigger point to R UT in order to decrease pain and improve muscle tension.  Manual UT stretch 2x1' with MET  Grade II CPA/UPS to C5-T2 to improve UE kinematics  Grade II scapular mobs to improve " scapular mobility    Home Exercises Provided and Patient Education Provided     Education provided:   Pt educated on purpose of exercises and manual therapy being performed. Educated on importance of posture and proper shoulder kinematics.    Written Home Exercises Provided: yes.  Exercises were reviewed and Amanda was able to demonstrate them prior to the end of the session.  Amanda demonstrated good  understanding of the education provided.     See EMR under Patient Instructions for exercises provided 2/25/2019.    Assessment   Pt tolerated exercises well today. Patient was educated on scapular dyskinesia and importance of restoring ROM. Reports positive response to treatment, states that her shoulder feels more relaxed post treatment today. Patient required min cues for proper form and technique of exercises but was able to perform I post treatment. Denies any increase in pain or soreness with treatment today.     Amanda is progressing well towards her goals.   Pt prognosis is Good.     Pt will continue to benefit from skilled outpatient physical therapy to address the deficits listed in the problem list box on initial evaluation, provide pt/family education and to maximize pt's level of independence in the home and community environment.     Pt's spiritual, cultural and educational needs considered and pt agreeable to plan of care and goals.     Anticipated barriers to physical therapy: None    Goals:   Short Term Goals: 5 weeks   Pt will demonstrate [place] with initial HEP  Pt will demonstrate awareness of and improved posture with less cues to maintain upright posture in order to decrease pain in improve QOL.  Pt will demonstrate improve LSB and RR cervical ROM by 5' each in order to improve fx mobility and decrease pain.  Pt will demonstrate improve R shoulder abd by 10' in order to improve fx mobility.  Pt will demonstrate reported pain equal to or less than 5/10 at worst in order to improve pt mobility  and QOL.     Long Term Goals: 10 weeks   Pt will demonstrate [place] with initial HEP  Pt will demonstrate awareness of and maintain upright posture independently in order to decrease pain in improve QOL.  Pt will demonstrate improve LSB and RR cervical ROM by 10' each in order to improve fx mobility and decrease pain.  Pt will demonstrate improve R shoulder abd to WNL in order to improve fx mobility.  Pt will demonstrate reported pain equal to or less than 2/10 at worst in order to improve pt mobility and QOL.    Plan     Plan of care Certification: 2/22/2019 to 4/3/2019     Outpatient Physical Therapy 2 times weekly for 10 weeks to include the following interventions: Cervical/Lumbar Traction, Manual Therapy, Moist Heat/ Ice, Neuromuscular Re-ed, Patient Education, Self Care, Therapeutic Activites and Therapeutic Exercise.      Mehul Lincoln, PT

## 2019-02-25 ENCOUNTER — CLINICAL SUPPORT (OUTPATIENT)
Dept: REHABILITATION | Facility: HOSPITAL | Age: 54
End: 2019-02-25
Attending: NURSE PRACTITIONER
Payer: COMMERCIAL

## 2019-02-25 DIAGNOSIS — M25.619 LIMITED RANGE OF MOTION (ROM) OF SHOULDER: ICD-10-CM

## 2019-02-25 DIAGNOSIS — M25.511 ACUTE PAIN OF RIGHT SHOULDER: ICD-10-CM

## 2019-02-25 DIAGNOSIS — R29.898 DECREASED ROM OF NECK: ICD-10-CM

## 2019-02-25 PROCEDURE — 97140 MANUAL THERAPY 1/> REGIONS: CPT | Mod: PN

## 2019-02-25 PROCEDURE — 97110 THERAPEUTIC EXERCISES: CPT | Mod: PN

## 2019-03-07 ENCOUNTER — CLINICAL SUPPORT (OUTPATIENT)
Dept: REHABILITATION | Facility: HOSPITAL | Age: 54
End: 2019-03-07
Attending: NURSE PRACTITIONER
Payer: COMMERCIAL

## 2019-03-07 DIAGNOSIS — M25.511 ACUTE PAIN OF RIGHT SHOULDER: ICD-10-CM

## 2019-03-07 DIAGNOSIS — R29.898 DECREASED ROM OF NECK: ICD-10-CM

## 2019-03-07 DIAGNOSIS — M25.619 LIMITED RANGE OF MOTION (ROM) OF SHOULDER: ICD-10-CM

## 2019-03-07 PROCEDURE — 97110 THERAPEUTIC EXERCISES: CPT | Mod: PN

## 2019-03-07 PROCEDURE — 97140 MANUAL THERAPY 1/> REGIONS: CPT | Mod: PN

## 2019-03-07 NOTE — PROGRESS NOTES
"  Physical Therapy Daily Treatment Note     Name: Amanda Ponce  Clinic Number: [id number]    Therapy Diagnosis:   Encounter Diagnoses   Name Primary?    Acute pain of right shoulder     Limited range of motion (ROM) of shoulder     Decreased ROM of neck      Physician: Giovanna Isaac F*    Visit Date: 3/7/2019    Physician Orders: PT Eval and Treat  Medical Diagnosis from Referral: Cervical radiculopathy, acute pain of right shoulder, muscle spasm  Evaluation Date: 2/22/2019  Authorization Period Expiration: 12/31/2019  Plan of Care Expiration: 4/3/2019  Visit # / Visits authorized: 3/ 20    Time In: 1655  Time Out: 1750  Total Billable Time: 55 minutes    Precautions: Standard    Subjective     Pt reports: Pt reports that her shoulder is feeling a little better and she has been sleeping better. Pt reports she tried to rest her arm a little bit, states that she did not reach a lot at the parade.     She was compliant with home exercise program.    Response to previous treatment: Pt reports being mildyl sore after last visit but felt better all weekend     Pain: 2/10  Location: right shoulder      Objective     Amanda received therapeutic exercises to develop strength, endurance, ROM, flexibility and posture for 40 minutes including:  UT stretch 2x30"  LS Stretch 2x30"  Pec Stretch 2x30"  Chin Tucks x30 with 2sec holds   Rows with red t-band 2x20 cues to relax B UT  IR with red t-band 2x20  ER with red t-band 2x20   Serratus push ups on wall 2x20  Searratus Ys on wall 2x20  AROM Scaption x20      Amanda received the following manual therapy techniques: Joint mobilizations, Soft tissue Mobilization and Friction Massage were applied to the: shoulder/neck/back for 15 minutes, including:  STM/trigger point to R UT in order to decrease pain and improve muscle tension.  Manual UT stretch 2x1' with MET  Grade III CPA/UPS to C5-T2 to improve UE kinematics    Home Exercises Provided and Patient Education Provided "     Education provided:   Pt educated on purpose of exercises and manual therapy being performed.   Continued education on importance of posture and proper shoulder kinematics.    Written Home Exercises Provided: yes.  Exercises were reviewed and Amanda was able to demonstrate them prior to the end of the session.  Amanda demonstrated good  understanding of the education provided.     See EMR under Patient Instructions for exercises provided 2/25/2019.    Assessment   Pt tolerated exercises well today. Patient cont to demonstrate scapular dyskinesia B (R>L), however demonstrating improved protraction with UE elevation, able to perform B shoulder scaption to 160' without any pain. Reports positive response to treatment, states that her shoulder feels more relaxed post treatment today.  Denies any increase in pain or soreness with treatment today.     Amanda is progressing well towards her goals.   Pt prognosis is Good.     Pt will continue to benefit from skilled outpatient physical therapy to address the deficits listed in the problem list box on initial evaluation, provide pt/family education and to maximize pt's level of independence in the home and community environment.     Pt's spiritual, cultural and educational needs considered and pt agreeable to plan of care and goals.     Anticipated barriers to physical therapy: None    Goals:   Short Term Goals: 5 weeks   Pt will demonstrate [place] with initial HEP  Pt will demonstrate awareness of and improved posture with less cues to maintain upright posture in order to decrease pain in improve QOL.  Pt will demonstrate improve LSB and RR cervical ROM by 5' each in order to improve fx mobility and decrease pain.  Pt will demonstrate improve R shoulder abd by 10' in order to improve fx mobility.  Pt will demonstrate reported pain equal to or less than 5/10 at worst in order to improve pt mobility and QOL.     Long Term Goals: 10 weeks   Pt will demonstrate  [place] with initial HEP  Pt will demonstrate awareness of and maintain upright posture independently in order to decrease pain in improve QOL.  Pt will demonstrate improve LSB and RR cervical ROM by 10' each in order to improve fx mobility and decrease pain.  Pt will demonstrate improve R shoulder abd to WNL in order to improve fx mobility.  Pt will demonstrate reported pain equal to or less than 2/10 at worst in order to improve pt mobility and QOL.    Plan     Plan of care Certification: 2/22/2019 to 4/3/2019     Outpatient Physical Therapy 2 times weekly for 10 weeks to include the following interventions: Cervical/Lumbar Traction, Manual Therapy, Moist Heat/ Ice, Neuromuscular Re-ed, Patient Education, Self Care, Therapeutic Activites and Therapeutic Exercise.      Mehul Lincoln, PT

## 2019-11-29 ENCOUNTER — OFFICE VISIT (OUTPATIENT)
Dept: INTERNAL MEDICINE | Facility: CLINIC | Age: 54
End: 2019-11-29
Payer: COMMERCIAL

## 2019-11-29 ENCOUNTER — IMMUNIZATION (OUTPATIENT)
Dept: INTERNAL MEDICINE | Facility: CLINIC | Age: 54
End: 2019-11-29
Payer: COMMERCIAL

## 2019-11-29 VITALS
DIASTOLIC BLOOD PRESSURE: 100 MMHG | HEIGHT: 59 IN | BODY MASS INDEX: 41.6 KG/M2 | OXYGEN SATURATION: 98 % | HEART RATE: 96 BPM | WEIGHT: 206.38 LBS | SYSTOLIC BLOOD PRESSURE: 180 MMHG

## 2019-11-29 DIAGNOSIS — T75.3XXA SEA SICKNESS, INITIAL ENCOUNTER: ICD-10-CM

## 2019-11-29 DIAGNOSIS — M62.838 MUSCLE SPASM: ICD-10-CM

## 2019-11-29 DIAGNOSIS — R80.9 TYPE 2 DIABETES MELLITUS WITH MICROALBUMINURIA, WITHOUT LONG-TERM CURRENT USE OF INSULIN: ICD-10-CM

## 2019-11-29 DIAGNOSIS — I10 ESSENTIAL HYPERTENSION: ICD-10-CM

## 2019-11-29 DIAGNOSIS — I15.2 HYPERTENSION ASSOCIATED WITH DIABETES: ICD-10-CM

## 2019-11-29 DIAGNOSIS — M25.511 ACUTE PAIN OF RIGHT SHOULDER: ICD-10-CM

## 2019-11-29 DIAGNOSIS — Z12.39 BREAST CANCER SCREENING: ICD-10-CM

## 2019-11-29 DIAGNOSIS — Z00.00 ANNUAL PHYSICAL EXAM: Primary | ICD-10-CM

## 2019-11-29 DIAGNOSIS — E11.69 HYPERLIPIDEMIA ASSOCIATED WITH TYPE 2 DIABETES MELLITUS: ICD-10-CM

## 2019-11-29 DIAGNOSIS — E55.9 VITAMIN D DEFICIENCY DISEASE: ICD-10-CM

## 2019-11-29 DIAGNOSIS — E78.2 MIXED HYPERLIPIDEMIA: ICD-10-CM

## 2019-11-29 DIAGNOSIS — E66.01 SEVERE OBESITY (BMI 35.0-35.9 WITH COMORBIDITY): ICD-10-CM

## 2019-11-29 DIAGNOSIS — E78.5 HYPERLIPIDEMIA ASSOCIATED WITH TYPE 2 DIABETES MELLITUS: ICD-10-CM

## 2019-11-29 DIAGNOSIS — E11.29 TYPE 2 DIABETES MELLITUS WITH MICROALBUMINURIA, WITHOUT LONG-TERM CURRENT USE OF INSULIN: ICD-10-CM

## 2019-11-29 DIAGNOSIS — E11.59 HYPERTENSION ASSOCIATED WITH DIABETES: ICD-10-CM

## 2019-11-29 PROBLEM — R29.898 DECREASED ROM OF NECK: Status: RESOLVED | Noted: 2019-02-22 | Resolved: 2019-11-29

## 2019-11-29 PROCEDURE — 90686 IIV4 VACC NO PRSV 0.5 ML IM: CPT | Mod: S$GLB,,, | Performed by: INTERNAL MEDICINE

## 2019-11-29 PROCEDURE — 90471 FLU VACCINE (QUAD) GREATER THAN OR EQUAL TO 3YO PRESERVATIVE FREE IM: ICD-10-PCS | Mod: S$GLB,,, | Performed by: INTERNAL MEDICINE

## 2019-11-29 PROCEDURE — 3080F PR MOST RECENT DIASTOLIC BLOOD PRESSURE >= 90 MM HG: ICD-10-PCS | Mod: CPTII,S$GLB,, | Performed by: PHYSICIAN ASSISTANT

## 2019-11-29 PROCEDURE — 90686 FLU VACCINE (QUAD) GREATER THAN OR EQUAL TO 3YO PRESERVATIVE FREE IM: ICD-10-PCS | Mod: S$GLB,,, | Performed by: INTERNAL MEDICINE

## 2019-11-29 PROCEDURE — 90471 IMMUNIZATION ADMIN: CPT | Mod: S$GLB,,, | Performed by: INTERNAL MEDICINE

## 2019-11-29 PROCEDURE — 3077F PR MOST RECENT SYSTOLIC BLOOD PRESSURE >= 140 MM HG: ICD-10-PCS | Mod: CPTII,S$GLB,, | Performed by: PHYSICIAN ASSISTANT

## 2019-11-29 PROCEDURE — 3080F DIAST BP >= 90 MM HG: CPT | Mod: CPTII,S$GLB,, | Performed by: PHYSICIAN ASSISTANT

## 2019-11-29 PROCEDURE — 99396 PR PREVENTIVE VISIT,EST,40-64: ICD-10-PCS | Mod: S$GLB,,, | Performed by: PHYSICIAN ASSISTANT

## 2019-11-29 PROCEDURE — 3077F SYST BP >= 140 MM HG: CPT | Mod: CPTII,S$GLB,, | Performed by: PHYSICIAN ASSISTANT

## 2019-11-29 PROCEDURE — 99999 PR PBB SHADOW E&M-EST. PATIENT-LVL III: ICD-10-PCS | Mod: PBBFAC,,, | Performed by: PHYSICIAN ASSISTANT

## 2019-11-29 PROCEDURE — 99396 PREV VISIT EST AGE 40-64: CPT | Mod: S$GLB,,, | Performed by: PHYSICIAN ASSISTANT

## 2019-11-29 PROCEDURE — 99999 PR PBB SHADOW E&M-EST. PATIENT-LVL III: CPT | Mod: PBBFAC,,, | Performed by: PHYSICIAN ASSISTANT

## 2019-11-29 RX ORDER — LOSARTAN POTASSIUM 100 MG/1
100 TABLET ORAL DAILY
Qty: 90 TABLET | Refills: 3 | Status: SHIPPED | OUTPATIENT
Start: 2019-11-29 | End: 2020-11-16 | Stop reason: SDUPTHER

## 2019-11-29 RX ORDER — ATORVASTATIN CALCIUM 20 MG/1
20 TABLET, FILM COATED ORAL DAILY
Qty: 90 TABLET | Refills: 3 | Status: SHIPPED | OUTPATIENT
Start: 2019-11-29 | End: 2020-01-07 | Stop reason: SDUPTHER

## 2019-11-29 RX ORDER — ERGOCALCIFEROL 1.25 MG/1
50000 CAPSULE ORAL
Qty: 12 CAPSULE | Refills: 3 | Status: SHIPPED | OUTPATIENT
Start: 2019-11-29 | End: 2020-10-26

## 2019-11-29 RX ORDER — MELOXICAM 15 MG/1
15 TABLET ORAL DAILY
Qty: 30 TABLET | Refills: 0 | Status: SHIPPED | OUTPATIENT
Start: 2019-11-29 | End: 2021-02-02 | Stop reason: SDUPTHER

## 2019-11-29 RX ORDER — AMLODIPINE BESYLATE 10 MG/1
10 TABLET ORAL DAILY
Qty: 90 TABLET | Refills: 3 | Status: SHIPPED | OUTPATIENT
Start: 2019-11-29 | End: 2020-11-16 | Stop reason: SDUPTHER

## 2019-11-29 RX ORDER — SCOLOPAMINE TRANSDERMAL SYSTEM 1 MG/1
1 PATCH, EXTENDED RELEASE TRANSDERMAL
Qty: 4 PATCH | Refills: 0 | Status: SHIPPED | OUTPATIENT
Start: 2019-11-29 | End: 2021-02-02

## 2019-11-29 NOTE — PROGRESS NOTES
Subjective:       Patient ID: Amanda Ponce is a 54 y.o. female.    Chief Complaint: Annual Exam    Patient presents for annual exam. She feels well today and has no complaints. She has been out of her blood pressure medicine for a few weeks. Needs refills on all medicines. She is going on a cruise in a few weeks and is interested in something for motion sickness. Patient is due for routine labs and mammogram. She is interested in getting flu shot today. Patient states she had eye exam done earlier this year with Dr. Vyas. Patient is due for diabetic foot exam.     Review of Systems   Constitutional: Negative for activity change, appetite change, chills, fatigue and fever.   HENT: Negative for congestion, ear pain, postnasal drip, rhinorrhea, sinus pressure, sinus pain and sore throat.    Eyes: Negative for pain and visual disturbance.   Respiratory: Negative for cough, chest tightness and shortness of breath.    Cardiovascular: Negative for chest pain and palpitations.   Gastrointestinal: Negative for abdominal distention, abdominal pain, constipation, diarrhea, nausea and vomiting.   Genitourinary: Negative for difficulty urinating, dysuria, flank pain and urgency.   Musculoskeletal: Negative for arthralgias, back pain and myalgias.   Skin: Negative for color change and rash.   Neurological: Negative for dizziness, weakness, light-headedness, numbness and headaches.       Objective:      Physical Exam   Constitutional: She is oriented to person, place, and time. She appears well-developed and well-nourished. No distress.   HENT:   Head: Normocephalic and atraumatic.   Right Ear: External ear normal.   Left Ear: External ear normal.   Nose: Nose normal.   Mouth/Throat: Oropharynx is clear and moist. No oropharyngeal exudate.   Eyes: Pupils are equal, round, and reactive to light. Conjunctivae and EOM are normal.   Neck: Normal range of motion. Neck supple.   Cardiovascular: Normal rate, regular rhythm, normal  heart sounds and intact distal pulses.   No murmur heard.  Pulses:       Dorsalis pedis pulses are 2+ on the right side, and 2+ on the left side.        Posterior tibial pulses are 2+ on the right side, and 2+ on the left side.   Pulmonary/Chest: Effort normal and breath sounds normal. No respiratory distress. She has no wheezes.   Abdominal: Soft. Bowel sounds are normal. She exhibits no distension.   Musculoskeletal: Normal range of motion. She exhibits no edema or deformity.        Right foot: There is normal range of motion and no deformity.        Left foot: There is normal range of motion and no deformity.   Feet:   Right Foot:   Protective Sensation: 5 sites tested. 5 sites sensed.   Skin Integrity: Negative for ulcer, blister, skin breakdown, erythema, warmth, callus or dry skin.   Left Foot:   Protective Sensation: 5 sites tested. 5 sites sensed.   Skin Integrity: Negative for ulcer, blister, skin breakdown, erythema, warmth, callus or dry skin.   Neurological: She is alert and oriented to person, place, and time. Coordination normal.   Skin: Skin is warm and dry. No rash noted.   Psychiatric: She has a normal mood and affect. Her behavior is normal. Judgment and thought content normal.       Assessment:       1. Annual physical exam    2. Hyperlipidemia associated with type 2 diabetes mellitus    3. Vitamin D deficiency disease    4. Hypertension associated with diabetes    5. Type 2 diabetes mellitus with microalbuminuria, without long-term current use of insulin    6. Severe obesity (BMI 35.0-35.9 with comorbidity)    7. Breast cancer screening    8. Sea sickness, initial encounter    9. Acute pain of right shoulder    10. Muscle spasm    11. Essential hypertension    12. Mixed hyperlipidemia        Plan:     Amanda was seen today for annual exam.    Diagnoses and all orders for this visit:    Annual physical exam    Hyperlipidemia associated with type 2 diabetes mellitus  -     Lipid panel;  Future    Vitamin D deficiency disease  -     Vitamin D; Future  -     ergocalciferol (ERGOCALCIFEROL) 50,000 unit Cap; Take 1 capsule (50,000 Units total) by mouth every 7 days.    Hypertension associated with diabetes  -     CBC auto differential; Future  -     Comprehensive metabolic panel; Future    Type 2 diabetes mellitus with microalbuminuria, without long-term current use of insulin  -     Hemoglobin A1c; Future  -     TSH; Future    Severe obesity (BMI 35.0-35.9 with comorbidity)  -     TSH; Future    Breast cancer screening  -     Mammo Digital Screening Bilat; Future    Sea sickness, initial encounter  -     scopolamine (TRANSDERM-SCOP) 1.3-1.5 mg (1 mg over 3 days); Place 1 patch onto the skin every 72 hours.    Acute pain of right shoulder  -     meloxicam (MOBIC) 15 MG tablet; Take 1 tablet (15 mg total) by mouth once daily.    Muscle spasm  -     meloxicam (MOBIC) 15 MG tablet; Take 1 tablet (15 mg total) by mouth once daily.    Essential hypertension  -     losartan (COZAAR) 100 MG tablet; Take 1 tablet (100 mg total) by mouth once daily.  -     amLODIPine (NORVASC) 10 MG tablet; Take 1 tablet (10 mg total) by mouth once daily.    Mixed hyperlipidemia  -     atorvastatin (LIPITOR) 20 MG tablet; Take 1 tablet (20 mg total) by mouth once daily.    Will notify patient of results of labs once completed.   RTC in 3 weeks to see PCP for blood pressure check after restarting medication and to discuss Adipex.

## 2019-12-04 ENCOUNTER — LAB VISIT (OUTPATIENT)
Dept: LAB | Facility: HOSPITAL | Age: 54
End: 2019-12-04
Attending: PHYSICIAN ASSISTANT
Payer: COMMERCIAL

## 2019-12-04 DIAGNOSIS — I15.2 HYPERTENSION ASSOCIATED WITH DIABETES: ICD-10-CM

## 2019-12-04 DIAGNOSIS — E55.9 VITAMIN D DEFICIENCY DISEASE: ICD-10-CM

## 2019-12-04 DIAGNOSIS — E11.59 HYPERTENSION ASSOCIATED WITH DIABETES: ICD-10-CM

## 2019-12-04 DIAGNOSIS — E78.5 HYPERLIPIDEMIA ASSOCIATED WITH TYPE 2 DIABETES MELLITUS: ICD-10-CM

## 2019-12-04 DIAGNOSIS — E11.69 HYPERLIPIDEMIA ASSOCIATED WITH TYPE 2 DIABETES MELLITUS: ICD-10-CM

## 2019-12-04 DIAGNOSIS — E11.29 TYPE 2 DIABETES MELLITUS WITH MICROALBUMINURIA, WITHOUT LONG-TERM CURRENT USE OF INSULIN: ICD-10-CM

## 2019-12-04 DIAGNOSIS — E66.01 SEVERE OBESITY (BMI 35.0-35.9 WITH COMORBIDITY): ICD-10-CM

## 2019-12-04 DIAGNOSIS — R80.9 TYPE 2 DIABETES MELLITUS WITH MICROALBUMINURIA, WITHOUT LONG-TERM CURRENT USE OF INSULIN: ICD-10-CM

## 2019-12-04 LAB
25(OH)D3+25(OH)D2 SERPL-MCNC: 29 NG/ML (ref 30–96)
ALBUMIN SERPL BCP-MCNC: 4 G/DL (ref 3.5–5.2)
ALP SERPL-CCNC: 100 U/L (ref 55–135)
ALT SERPL W/O P-5'-P-CCNC: 26 U/L (ref 10–44)
ANION GAP SERPL CALC-SCNC: 11 MMOL/L (ref 8–16)
AST SERPL-CCNC: 27 U/L (ref 10–40)
BASOPHILS # BLD AUTO: 0.02 K/UL (ref 0–0.2)
BASOPHILS NFR BLD: 0.4 % (ref 0–1.9)
BILIRUB SERPL-MCNC: 0.5 MG/DL (ref 0.1–1)
BUN SERPL-MCNC: 12 MG/DL (ref 6–20)
CALCIUM SERPL-MCNC: 10 MG/DL (ref 8.7–10.5)
CHLORIDE SERPL-SCNC: 105 MMOL/L (ref 95–110)
CHOLEST SERPL-MCNC: 196 MG/DL (ref 120–199)
CHOLEST/HDLC SERPL: 3.8 {RATIO} (ref 2–5)
CO2 SERPL-SCNC: 28 MMOL/L (ref 23–29)
CREAT SERPL-MCNC: 0.8 MG/DL (ref 0.5–1.4)
DIFFERENTIAL METHOD: ABNORMAL
EOSINOPHIL # BLD AUTO: 0.3 K/UL (ref 0–0.5)
EOSINOPHIL NFR BLD: 5.2 % (ref 0–8)
ERYTHROCYTE [DISTWIDTH] IN BLOOD BY AUTOMATED COUNT: 14.7 % (ref 11.5–14.5)
EST. GFR  (AFRICAN AMERICAN): >60 ML/MIN/1.73 M^2
EST. GFR  (NON AFRICAN AMERICAN): >60 ML/MIN/1.73 M^2
ESTIMATED AVG GLUCOSE: 134 MG/DL (ref 68–131)
GLUCOSE SERPL-MCNC: 127 MG/DL (ref 70–110)
HBA1C MFR BLD HPLC: 6.3 % (ref 4–5.6)
HCT VFR BLD AUTO: 44.5 % (ref 37–48.5)
HDLC SERPL-MCNC: 51 MG/DL (ref 40–75)
HDLC SERPL: 26 % (ref 20–50)
HGB BLD-MCNC: 13.6 G/DL (ref 12–16)
IMM GRANULOCYTES # BLD AUTO: 0.02 K/UL (ref 0–0.04)
IMM GRANULOCYTES NFR BLD AUTO: 0.4 % (ref 0–0.5)
LDLC SERPL CALC-MCNC: 124.6 MG/DL (ref 63–159)
LYMPHOCYTES # BLD AUTO: 1.8 K/UL (ref 1–4.8)
LYMPHOCYTES NFR BLD: 31.8 % (ref 18–48)
MCH RBC QN AUTO: 28 PG (ref 27–31)
MCHC RBC AUTO-ENTMCNC: 30.6 G/DL (ref 32–36)
MCV RBC AUTO: 92 FL (ref 82–98)
MONOCYTES # BLD AUTO: 0.5 K/UL (ref 0.3–1)
MONOCYTES NFR BLD: 9.5 % (ref 4–15)
NEUTROPHILS # BLD AUTO: 2.9 K/UL (ref 1.8–7.7)
NEUTROPHILS NFR BLD: 52.7 % (ref 38–73)
NONHDLC SERPL-MCNC: 145 MG/DL
NRBC BLD-RTO: 0 /100 WBC
PLATELET # BLD AUTO: 312 K/UL (ref 150–350)
PMV BLD AUTO: 11.8 FL (ref 9.2–12.9)
POTASSIUM SERPL-SCNC: 3.9 MMOL/L (ref 3.5–5.1)
PROT SERPL-MCNC: 9 G/DL (ref 6–8.4)
RBC # BLD AUTO: 4.86 M/UL (ref 4–5.4)
SODIUM SERPL-SCNC: 144 MMOL/L (ref 136–145)
TRIGL SERPL-MCNC: 102 MG/DL (ref 30–150)
TSH SERPL DL<=0.005 MIU/L-ACNC: 2.07 UIU/ML (ref 0.4–4)
WBC # BLD AUTO: 5.57 K/UL (ref 3.9–12.7)

## 2019-12-04 PROCEDURE — 84443 ASSAY THYROID STIM HORMONE: CPT

## 2019-12-04 PROCEDURE — 85025 COMPLETE CBC W/AUTO DIFF WBC: CPT

## 2019-12-04 PROCEDURE — 82306 VITAMIN D 25 HYDROXY: CPT

## 2019-12-04 PROCEDURE — 83036 HEMOGLOBIN GLYCOSYLATED A1C: CPT

## 2019-12-04 PROCEDURE — 80053 COMPREHEN METABOLIC PANEL: CPT

## 2019-12-04 PROCEDURE — 36415 COLL VENOUS BLD VENIPUNCTURE: CPT | Mod: PO

## 2019-12-04 PROCEDURE — 80061 LIPID PANEL: CPT

## 2019-12-05 ENCOUNTER — TELEPHONE (OUTPATIENT)
Dept: INTERNAL MEDICINE | Facility: CLINIC | Age: 54
End: 2019-12-05

## 2019-12-05 DIAGNOSIS — E11.29 TYPE 2 DIABETES MELLITUS WITH MICROALBUMINURIA, WITHOUT LONG-TERM CURRENT USE OF INSULIN: Primary | ICD-10-CM

## 2019-12-05 DIAGNOSIS — R80.9 TYPE 2 DIABETES MELLITUS WITH MICROALBUMINURIA, WITHOUT LONG-TERM CURRENT USE OF INSULIN: Primary | ICD-10-CM

## 2019-12-05 NOTE — TELEPHONE ENCOUNTER
Needs to see me or La in next 3-4 weeks for BP check. Also needs mammogram and eye exam (ordered) thanks    Please schedule and let me know

## 2019-12-19 ENCOUNTER — PATIENT OUTREACH (OUTPATIENT)
Dept: ADMINISTRATIVE | Facility: HOSPITAL | Age: 54
End: 2019-12-19

## 2020-01-07 ENCOUNTER — HOSPITAL ENCOUNTER (OUTPATIENT)
Dept: RADIOLOGY | Facility: HOSPITAL | Age: 55
Discharge: HOME | End: 2020-01-07
Attending: PHYSICIAN ASSISTANT
Payer: COMMERCIAL

## 2020-01-07 ENCOUNTER — OFFICE VISIT (OUTPATIENT)
Dept: INTERNAL MEDICINE | Facility: CLINIC | Age: 55
End: 2020-01-07
Payer: COMMERCIAL

## 2020-01-07 ENCOUNTER — TELEPHONE (OUTPATIENT)
Dept: INTERNAL MEDICINE | Facility: CLINIC | Age: 55
End: 2020-01-07

## 2020-01-07 VITALS
HEART RATE: 86 BPM | HEIGHT: 59 IN | OXYGEN SATURATION: 98 % | DIASTOLIC BLOOD PRESSURE: 88 MMHG | SYSTOLIC BLOOD PRESSURE: 126 MMHG | BODY MASS INDEX: 39.42 KG/M2 | WEIGHT: 195.56 LBS

## 2020-01-07 DIAGNOSIS — E78.5 HYPERLIPIDEMIA ASSOCIATED WITH TYPE 2 DIABETES MELLITUS: ICD-10-CM

## 2020-01-07 DIAGNOSIS — E66.01 SEVERE OBESITY (BMI 35.0-35.9 WITH COMORBIDITY): ICD-10-CM

## 2020-01-07 DIAGNOSIS — E11.29 TYPE 2 DIABETES MELLITUS WITH MICROALBUMINURIA, WITHOUT LONG-TERM CURRENT USE OF INSULIN: ICD-10-CM

## 2020-01-07 DIAGNOSIS — M25.619 LIMITED RANGE OF MOTION (ROM) OF SHOULDER: ICD-10-CM

## 2020-01-07 DIAGNOSIS — E11.59 HYPERTENSION ASSOCIATED WITH DIABETES: Primary | ICD-10-CM

## 2020-01-07 DIAGNOSIS — E78.2 MIXED HYPERLIPIDEMIA: ICD-10-CM

## 2020-01-07 DIAGNOSIS — E11.69 HYPERLIPIDEMIA ASSOCIATED WITH TYPE 2 DIABETES MELLITUS: ICD-10-CM

## 2020-01-07 DIAGNOSIS — R80.9 TYPE 2 DIABETES MELLITUS WITH MICROALBUMINURIA, WITHOUT LONG-TERM CURRENT USE OF INSULIN: ICD-10-CM

## 2020-01-07 DIAGNOSIS — E55.9 VITAMIN D DEFICIENCY DISEASE: ICD-10-CM

## 2020-01-07 DIAGNOSIS — Z12.39 BREAST CANCER SCREENING: ICD-10-CM

## 2020-01-07 DIAGNOSIS — I15.2 HYPERTENSION ASSOCIATED WITH DIABETES: Primary | ICD-10-CM

## 2020-01-07 PROCEDURE — 3074F PR MOST RECENT SYSTOLIC BLOOD PRESSURE < 130 MM HG: ICD-10-PCS | Mod: CPTII,S$GLB,, | Performed by: NURSE PRACTITIONER

## 2020-01-07 PROCEDURE — 77063 MAMMO DIGITAL SCREENING BILAT WITH TOMOSYNTHESIS_CAD: ICD-10-PCS | Mod: 26,,, | Performed by: RADIOLOGY

## 2020-01-07 PROCEDURE — 3008F PR BODY MASS INDEX (BMI) DOCUMENTED: ICD-10-PCS | Mod: CPTII,S$GLB,, | Performed by: NURSE PRACTITIONER

## 2020-01-07 PROCEDURE — 3079F DIAST BP 80-89 MM HG: CPT | Mod: CPTII,S$GLB,, | Performed by: NURSE PRACTITIONER

## 2020-01-07 PROCEDURE — 77063 BREAST TOMOSYNTHESIS BI: CPT | Mod: 26,,, | Performed by: RADIOLOGY

## 2020-01-07 PROCEDURE — 3044F HG A1C LEVEL LT 7.0%: CPT | Mod: CPTII,S$GLB,, | Performed by: NURSE PRACTITIONER

## 2020-01-07 PROCEDURE — 99999 PR PBB SHADOW E&M-EST. PATIENT-LVL III: CPT | Mod: PBBFAC,,, | Performed by: NURSE PRACTITIONER

## 2020-01-07 PROCEDURE — 99214 OFFICE O/P EST MOD 30 MIN: CPT | Mod: S$GLB,,, | Performed by: NURSE PRACTITIONER

## 2020-01-07 PROCEDURE — 99999 PR PBB SHADOW E&M-EST. PATIENT-LVL III: ICD-10-PCS | Mod: PBBFAC,,, | Performed by: NURSE PRACTITIONER

## 2020-01-07 PROCEDURE — 3044F PR MOST RECENT HEMOGLOBIN A1C LEVEL <7.0%: ICD-10-PCS | Mod: CPTII,S$GLB,, | Performed by: NURSE PRACTITIONER

## 2020-01-07 PROCEDURE — 77067 MAMMO DIGITAL SCREENING BILAT WITH TOMOSYNTHESIS_CAD: ICD-10-PCS | Mod: 26,,, | Performed by: RADIOLOGY

## 2020-01-07 PROCEDURE — 77067 SCR MAMMO BI INCL CAD: CPT | Mod: 26,,, | Performed by: RADIOLOGY

## 2020-01-07 PROCEDURE — 99214 PR OFFICE/OUTPT VISIT, EST, LEVL IV, 30-39 MIN: ICD-10-PCS | Mod: S$GLB,,, | Performed by: NURSE PRACTITIONER

## 2020-01-07 PROCEDURE — 3008F BODY MASS INDEX DOCD: CPT | Mod: CPTII,S$GLB,, | Performed by: NURSE PRACTITIONER

## 2020-01-07 PROCEDURE — 3074F SYST BP LT 130 MM HG: CPT | Mod: CPTII,S$GLB,, | Performed by: NURSE PRACTITIONER

## 2020-01-07 PROCEDURE — 3079F PR MOST RECENT DIASTOLIC BLOOD PRESSURE 80-89 MM HG: ICD-10-PCS | Mod: CPTII,S$GLB,, | Performed by: NURSE PRACTITIONER

## 2020-01-07 PROCEDURE — 77067 SCR MAMMO BI INCL CAD: CPT | Mod: TC

## 2020-01-07 RX ORDER — ATORVASTATIN CALCIUM 20 MG/1
20 TABLET, FILM COATED ORAL DAILY
Qty: 90 TABLET | Refills: 3 | Status: SHIPPED | OUTPATIENT
Start: 2020-01-07 | End: 2021-02-02 | Stop reason: SDUPTHER

## 2020-01-07 RX ORDER — PHENTERMINE HYDROCHLORIDE 37.5 MG/1
37.5 TABLET ORAL
Qty: 30 TABLET | Refills: 0 | Status: SHIPPED | OUTPATIENT
Start: 2020-01-07 | End: 2020-02-06

## 2020-01-07 NOTE — TELEPHONE ENCOUNTER
I can prescribe Adipex for 1 month but I will need to see her in 1 month for blood pressure check and weight follow-up.      Is she willing to see a dietitian as well?    Please schedule and let me know

## 2020-01-07 NOTE — Clinical Note
Dr Sullivan, She would like to restart adipex - her BP is controlled today but high normal. I told her that I would discuss with you. La

## 2020-01-07 NOTE — PROGRESS NOTES
INTERNAL MEDICINE PROGRESS NOTE    CHIEF COMPLAINT     Chief Complaint   Patient presents with    Blood Pressure Check    Shoulder Pain     reoccuring shoulder pain, R shoulder        HPI     Amanda Ponce is a 54 y.o. female with HLD, HTN, DM2, vit d def, obesity and Shoulder pain who presents for a follow up visit today.  She is an established pt of Dr Sullivan.     Was seen by GENIA Coles for annual 1 month ago. BP elevated - was out of meds. Here for BP check.   HTN- taking losartan 100mg and amlodipine 10mg daily     Shoulder pain- has reocurring right shoulder pain   Taking meloxicam 15mg daily   Located to the anterior right shoulder- worse with overuse.   Improved with PT in past. Still doing HEP.   Also worse at night.       HM-   MMG- today   Eye - scheduled   On Statin   All other HM UTD         Past Medical History:  Past Medical History:   Diagnosis Date    HTN (hypertension)     Obesity, Class II, BMI 35-39.9, with comorbidity 12/19/2016    Vitamin D deficiency disease 3/24/2015       Home Medications:  Prior to Admission medications    Medication Sig Start Date End Date Taking? Authorizing Provider   amLODIPine (NORVASC) 10 MG tablet Take 1 tablet (10 mg total) by mouth once daily. 11/29/19  Yes GENIA Lake   blood sugar diagnostic (ONE TOUCH ULTRA TEST) Strp 1 each by Misc.(Non-Drug; Combo Route) route daily as needed. 8/23/12  Yes Mehnaz Quinones NP   ergocalciferol (ERGOCALCIFEROL) 50,000 unit Cap Take 1 capsule (50,000 Units total) by mouth every 7 days. 11/29/19  Yes GENIA Lake   lancets Misc 1 each by Misc.(Non-Drug; Combo Route) route once daily. 8/23/12  Yes Mehnaz Quinones NP   losartan (COZAAR) 100 MG tablet Take 1 tablet (100 mg total) by mouth once daily. 11/29/19 11/28/20 Yes GENIA Lake   meloxicam (MOBIC) 15 MG tablet Take 1 tablet (15 mg total) by mouth once daily. 11/29/19  Yes GENIA Lake   scopolamine (TRANSDERM-SCOP) 1.3-1.5 mg (1  mg over 3 days) Place 1 patch onto the skin every 72 hours. 11/29/19  Yes GENIA Lake   aspirin (ECOTRIN) 81 MG EC tablet Take 1 tablet (81 mg total) by mouth once daily. 6/29/18 11/29/19  Malini Sullivan MD   atorvastatin (LIPITOR) 20 MG tablet Take 1 tablet (20 mg total) by mouth once daily. 11/29/19 12/29/19  GENIA Lake       Review of Systems:  Review of Systems   Constitutional: Negative for chills, fatigue, fever and unexpected weight change.   HENT: Negative for congestion, hearing loss, rhinorrhea and sinus pressure.    Eyes: Negative for pain, redness and visual disturbance.   Respiratory: Negative for cough and shortness of breath.    Cardiovascular: Negative for chest pain and palpitations.   Gastrointestinal: Negative for abdominal distention, abdominal pain, constipation, diarrhea, nausea and vomiting.   Endocrine: Negative for polydipsia, polyphagia and polyuria.   Genitourinary: Negative for dysuria, frequency, urgency and vaginal discharge.   Musculoskeletal: Positive for arthralgias (right shoulder ). Negative for gait problem and myalgias.   Skin: Negative for color change and rash.   Allergic/Immunologic: Negative for environmental allergies and immunocompromised state.   Neurological: Negative for dizziness, weakness, light-headedness and headaches.   Hematological: Negative for adenopathy. Does not bruise/bleed easily.   Psychiatric/Behavioral: Negative for confusion and sleep disturbance. The patient is not nervous/anxious.        Health Maintainence:   Immunizations:  Health Maintenance       Date Due Completion Date    Low Dose Statin 09/15/1987 ---    Mammogram 07/06/2019 7/6/2018    Eye Exam 10/29/2019 10/29/2018    Override on 12/14/2016: Done (Dr Ruiz)    Override on 7/1/2015: Done    Override on 12/6/2012: Done    Override on 6/6/2012: Done    Shingles Vaccine (1 of 2) 11/29/2020 (Originally 9/15/2016) ---    Hemoglobin A1c 06/04/2020 12/4/2019    Override on  "9/22/2016: Done    Override on 2/16/2016: Done    Override on 3/23/2015: Done    Foot Exam 11/29/2020 11/29/2019    Override on 1/5/2017: Done    Override on 12/19/2016: Done    Override on 2/16/2016: Done    Override on 3/23/2015: Done    Lipid Panel 12/04/2020 12/4/2019    Override on 3/23/2015: Done    Colonoscopy 10/19/2028 10/19/2018    TETANUS VACCINE 10/29/2028 10/29/2018           PHYSICAL EXAM     BP (!) 124/92 (BP Location: Left arm, Patient Position: Sitting, BP Method: Large (Manual))   Pulse 86   Ht 4' 11" (1.499 m)   Wt 88.7 kg (195 lb 8.8 oz)   SpO2 98%   BMI 39.50 kg/m²     Physical Exam   Constitutional: She is oriented to person, place, and time. She appears well-developed and well-nourished.   HENT:   Head: Normocephalic and atraumatic.   Eyes: Pupils are equal, round, and reactive to light.   Cardiovascular: Normal rate and regular rhythm.   Pulmonary/Chest: Effort normal.   Neurological: She is alert and oriented to person, place, and time.   Psychiatric: She has a normal mood and affect.       LABS     Lab Results   Component Value Date    HGBA1C 6.3 (H) 12/04/2019     CMP  Sodium   Date Value Ref Range Status   12/04/2019 144 136 - 145 mmol/L Final     Potassium   Date Value Ref Range Status   12/04/2019 3.9 3.5 - 5.1 mmol/L Final     Chloride   Date Value Ref Range Status   12/04/2019 105 95 - 110 mmol/L Final     CO2   Date Value Ref Range Status   12/04/2019 28 23 - 29 mmol/L Final     Glucose   Date Value Ref Range Status   12/04/2019 127 (H) 70 - 110 mg/dL Final     BUN, Bld   Date Value Ref Range Status   12/04/2019 12 6 - 20 mg/dL Final     Creatinine   Date Value Ref Range Status   12/04/2019 0.8 0.5 - 1.4 mg/dL Final     Calcium   Date Value Ref Range Status   12/04/2019 10.0 8.7 - 10.5 mg/dL Final     Total Protein   Date Value Ref Range Status   12/04/2019 9.0 (H) 6.0 - 8.4 g/dL Final     Albumin   Date Value Ref Range Status   12/04/2019 4.0 3.5 - 5.2 g/dL Final     Total " Bilirubin   Date Value Ref Range Status   12/04/2019 0.5 0.1 - 1.0 mg/dL Final     Comment:     For infants and newborns, interpretation of results should be based  on gestational age, weight and in agreement with clinical  observations.  Premature Infant recommended reference ranges:  Up to 24 hours.............<8.0 mg/dL  Up to 48 hours............<12.0 mg/dL  3-5 days..................<15.0 mg/dL  6-29 days.................<15.0 mg/dL       Alkaline Phosphatase   Date Value Ref Range Status   12/04/2019 100 55 - 135 U/L Final     AST   Date Value Ref Range Status   12/04/2019 27 10 - 40 U/L Final     ALT   Date Value Ref Range Status   12/04/2019 26 10 - 44 U/L Final     Anion Gap   Date Value Ref Range Status   12/04/2019 11 8 - 16 mmol/L Final     eGFR if    Date Value Ref Range Status   12/04/2019 >60.0 >60 mL/min/1.73 m^2 Final     eGFR if non    Date Value Ref Range Status   12/04/2019 >60.0 >60 mL/min/1.73 m^2 Final     Comment:     Calculation used to obtain the estimated glomerular filtration  rate (eGFR) is the CKD-EPI equation.        Lab Results   Component Value Date    WBC 5.57 12/04/2019    HGB 13.6 12/04/2019    HCT 44.5 12/04/2019    MCV 92 12/04/2019     12/04/2019     Lab Results   Component Value Date    CHOL 196 12/04/2019    CHOL 200 (H) 10/22/2018    CHOL 185 06/22/2018     Lab Results   Component Value Date    HDL 51 12/04/2019    HDL 53 10/22/2018    HDL 46 06/22/2018     Lab Results   Component Value Date    LDLCALC 124.6 12/04/2019    LDLCALC 124.8 10/22/2018    LDLCALC 123.4 06/22/2018     Lab Results   Component Value Date    TRIG 102 12/04/2019    TRIG 111 10/22/2018    TRIG 78 06/22/2018     Lab Results   Component Value Date    CHOLHDL 26.0 12/04/2019    CHOLHDL 26.5 10/22/2018    CHOLHDL 24.9 06/22/2018     Lab Results   Component Value Date    TSH 2.069 12/04/2019       ASSESSMENT/PLAN     Amanda Ponce is a 54 y.o. female with  Past Medical  History:   Diagnosis Date    HTN (hypertension)     Obesity, Class II, BMI 35-39.9, with comorbidity 12/19/2016    Vitamin D deficiency disease 3/24/2015     Hypertension associated with diabetes- at goal today. Will cont current meds.     Type 2 diabetes mellitus with microalbuminuria, without long-term current use of insulin- a1c controlled with diet only. Will cont to monitor, discussed low sugar and processed food diet.     Limited range of motion (ROM) of shoulder- may use meloxicam as needed. Refer to ortho   -     Ambulatory referral/consult to Orthopedics; Future; Expected date: 01/07/2020    Hyperlipidemia associated with type 2 diabetes mellitus- near goal. Will cont statin     Vitamin D deficiency disease- near goal. Cont ergo 50,000 weekly     Mixed hyperlipidemia  -     atorvastatin (LIPITOR) 20 MG tablet; Take 1 tablet (20 mg total) by mouth once daily.  Dispense: 90 tablet; Refill: 3    Severe obesity (BMI 35.0-35.9 with comorbidity)- pt would like adipex - will discuss with Dr Sullivan.     Follow up with PCP    Patient education provided from Chris. Patient was counseled on when and how to seek emergent care.       La RAMON, APRN, FNP-c   Department of Internal Medicine - [facility]  8:11 AM

## 2020-01-07 NOTE — TELEPHONE ENCOUNTER
----- Message from La Juarez-SULMA Garay sent at 1/7/2020  8:31 AM CST -----  Dr Sullivan,   She would like to restart adipex - her BP is controlled today but high normal. I told her that I would discuss with you.   La

## 2020-01-13 ENCOUNTER — TELEPHONE (OUTPATIENT)
Dept: INTERNAL MEDICINE | Facility: CLINIC | Age: 55
End: 2020-01-13

## 2020-01-17 ENCOUNTER — PATIENT OUTREACH (OUTPATIENT)
Dept: ADMINISTRATIVE | Facility: HOSPITAL | Age: 55
End: 2020-01-17

## 2020-01-17 ENCOUNTER — TELEPHONE (OUTPATIENT)
Dept: ADMINISTRATIVE | Facility: HOSPITAL | Age: 55
End: 2020-01-17

## 2020-01-17 NOTE — PROGRESS NOTES
Prior external Eye Exam with Dr Vyas 1-24-19 Updated .VM left for patient that it is time to call and schedule her external Eye Exam, fax number provided to send the report to our office.

## 2020-01-22 ENCOUNTER — PATIENT OUTREACH (OUTPATIENT)
Dept: ADMINISTRATIVE | Facility: HOSPITAL | Age: 55
End: 2020-01-22

## 2020-01-22 NOTE — PROGRESS NOTES
Attempted outreach to pt RE: pt due for eye exam, sees outside provider. No answer, left voice mail requesting return call. Chart review completed.

## 2020-06-09 ENCOUNTER — HOSPITAL ENCOUNTER (EMERGENCY)
Facility: HOSPITAL | Age: 55
Discharge: HOME | End: 2020-06-09
Attending: EMERGENCY MEDICINE
Payer: COMMERCIAL

## 2020-06-09 VITALS
TEMPERATURE: 99 F | BODY MASS INDEX: 38.1 KG/M2 | SYSTOLIC BLOOD PRESSURE: 181 MMHG | DIASTOLIC BLOOD PRESSURE: 98 MMHG | OXYGEN SATURATION: 100 % | HEART RATE: 100 BPM | HEIGHT: 59 IN | WEIGHT: 189 LBS | RESPIRATION RATE: 18 BRPM

## 2020-06-09 DIAGNOSIS — S99.922A FOOT INJURY, LEFT, INITIAL ENCOUNTER: Primary | ICD-10-CM

## 2020-06-09 PROCEDURE — 99283 EMERGENCY DEPT VISIT LOW MDM: CPT | Mod: 25,ER

## 2020-06-09 PROCEDURE — 25000003 PHARM REV CODE 250: Mod: ER | Performed by: PHYSICIAN ASSISTANT

## 2020-06-09 RX ORDER — ACETAMINOPHEN 325 MG/1
650 TABLET ORAL
Status: COMPLETED | OUTPATIENT
Start: 2020-06-09 | End: 2020-06-09

## 2020-06-09 RX ORDER — DICLOFENAC SODIUM 10 MG/G
2 GEL TOPICAL 4 TIMES DAILY PRN
Qty: 100 G | Refills: 0 | Status: SHIPPED | OUTPATIENT
Start: 2020-06-09 | End: 2022-07-05

## 2020-06-09 RX ADMIN — ACETAMINOPHEN 650 MG: 325 TABLET ORAL at 12:06

## 2020-06-09 NOTE — ED NOTES
Past Medical History:   Diagnosis Date    HTN (hypertension)     Obesity, Class II, BMI 35-39.9, with comorbidity 12/19/2016    Vitamin D deficiency disease 3/24/2015     Pt presenting with co left foot outer 2 digits pain and swelling . States she dropped a can on her foot 2 weeks ago and the pain is worse with ambulation. No medication taken today.

## 2020-06-09 NOTE — ED PROVIDER NOTES
Encounter Date: 6/9/2020    SCRIBE #1 NOTE: I, Nakul Resendiz, am scribing for, and in the presence of,  GENIA Nye. I have scribed the following portions of the note - Other sections scribed: HPI, ROS, PE.       History     Chief Complaint   Patient presents with    Foot Pain     c/o L foot pain after pt dropped a beer can on it a few weeks ago.      54 year old female with pain to her left foot after dropping a frozen beer can on it 2 weeks ago. States she initially stayed off of her feet for a few days, but has been walking on it recently and has had persistent pain to her dorsal left forefoot since. Denies any numbness or any other trauma/injury.    The history is provided by the patient. No  was used.     Review of patient's allergies indicates:   Allergen Reactions    Ace inhibitors      Other reaction(s): cough     Past Medical History:   Diagnosis Date    HTN (hypertension)     Obesity, Class II, BMI 35-39.9, with comorbidity 12/19/2016    Vitamin D deficiency disease 3/24/2015     Past Surgical History:   Procedure Laterality Date    CARDIAC SURGERY      Age 3- murmur    COLONOSCOPY N/A 10/19/2018    Procedure: COLONOSCOPY;  Surgeon: Cali De Jesus MD;  Location: [facility] ([address] [place] [address]);  Service: Endoscopy;  Laterality: N/A;    HYSTERECTOMY      TLH WITHOUT BSO     Family History   Problem Relation Age of Onset    Diabetes Father     COPD Father     Hypertension Father     Hypertension Mother     No Known Problems Brother     No Known Problems Daughter     No Known Problems Daughter     Hypertension Brother     No Known Problems Brother     Glaucoma Neg Hx     Breast cancer Neg Hx     Colon cancer Neg Hx     Ovarian cancer Neg Hx      Social History     Tobacco Use    Smoking status: Never Smoker    Smokeless tobacco: Never Used   Substance Use Topics    Alcohol use: Yes     Comment: ocassionally    Drug use: No     Review of Systems   Musculoskeletal: Positive  for arthralgias.   Neurological: Negative for weakness and numbness.   All other systems reviewed and are negative.      Physical Exam     Initial Vitals [06/09/20 1148]   BP Pulse Resp Temp SpO2   (!) 181/98 100 18 98.8 °F (37.1 °C) 100 %      MAP       --         Physical Exam    Nursing note and vitals reviewed.  Constitutional: She appears well-developed and well-nourished.   HENT:   Head: Normocephalic and atraumatic.   Right Ear: Tympanic membrane and external ear normal.   Left Ear: Tympanic membrane and external ear normal.   Nose: Nose normal.   Mouth/Throat: Uvula is midline, oropharynx is clear and moist and mucous membranes are normal.   Eyes: Conjunctivae are normal.   Neck: Normal range of motion. Neck supple.   Cardiovascular: Normal rate, regular rhythm and intact distal pulses.   Pulmonary/Chest: Effort normal. No respiratory distress.   Abdominal: There is no CVA tenderness.   Musculoskeletal: Normal range of motion.   Minimal tenderness over the left 4th and 5th metatarsal with no edema, erythema, or skin lesions.   Neurological: She is alert and oriented to person, place, and time.   Skin: Skin is warm and dry. No rash noted.   Psychiatric: She has a normal mood and affect. Her speech is normal and behavior is normal. Judgment and thought content normal.         ED Course   Procedures  Labs Reviewed - No data to display       Imaging Results          X-Ray Foot Complete Left (Final result)  Result time 06/09/20 12:21:22    Final result by Pedro Sanchez MD (06/09/20 12:21:22)                 Impression:      No posttraumatic or other change.      Electronically signed by: Pedro Sanchez MD  Date:    06/09/2020  Time:    12:21             Narrative:    EXAMINATION:  XR FOOT COMPLETE 3 VIEW LEFT    CLINICAL HISTORY:  .  Unspecified injury of left foot, initial encounter    TECHNIQUE:  AP, lateral and oblique views of the left foot were performed.    COMPARISON:  [facility]teen    FINDINGS:  Tiny heel spur.  Bone joint soft tissues stable normal.                              X-Rays:   Independently Interpreted Readings:   Other Readings:  X-ray left foot with no evidence of acute fracture    Medical Decision Making:   History:   Old Medical Records: I decided to obtain old medical records.  Independently Interpreted Test(s):   I have ordered and independently interpreted X-rays - see prior notes.  Clinical Tests:   Radiological Study: Ordered and Reviewed  ED Management:  54-year-old female with history exam concerning for left foot contusion.  No evidence of fracture or Lisfranc injury on x-ray.  No evidence of infection.  Will provide postop shoe and have patient follow up with Ortho.            Scribe Attestation:   Scribe #1: I performed the above scribed service and the documentation accurately describes the services I performed. I attest to the accuracy of the note.    Ras mendoza                      Clinical Impression:     1. Foot injury, left, initial encounter                ED Disposition Condition    Discharge Stable        ED Prescriptions     Medication Sig Dispense Start Date End Date Auth. Provider    diclofenac sodium (VOLTAREN) 1 % Gel Apply 2 g topically 4 (four) times daily as needed. 100 g 6/9/2020 6/19/2020 Ras Mendoza PA-C        Follow-up Information     Follow up With Specialties Details Why Contact Info    Rodolfo Matthew MD Orthopedic Surgery   [address] [facility]  [address]  [place] [address]  [phone]      Malini Sullivan MD Internal Medicine   [address]  [place] [address]  [phone]                                       Ras Mendoza PA-C  06/09/20 1231

## 2020-11-16 DIAGNOSIS — I10 ESSENTIAL HYPERTENSION: ICD-10-CM

## 2020-11-16 DIAGNOSIS — E55.9 VITAMIN D DEFICIENCY DISEASE: ICD-10-CM

## 2020-11-16 RX ORDER — LOSARTAN POTASSIUM 100 MG/1
100 TABLET ORAL DAILY
Qty: 90 TABLET | Refills: 3 | Status: SHIPPED | OUTPATIENT
Start: 2020-11-16 | End: 2021-02-02 | Stop reason: SDUPTHER

## 2020-11-16 RX ORDER — ERGOCALCIFEROL 1.25 MG/1
50000 CAPSULE ORAL
Qty: 12 CAPSULE | Refills: 0 | Status: SHIPPED | OUTPATIENT
Start: 2020-11-16 | End: 2021-02-04

## 2020-11-16 RX ORDER — AMLODIPINE BESYLATE 10 MG/1
10 TABLET ORAL DAILY
Qty: 90 TABLET | Refills: 3 | Status: SHIPPED | OUTPATIENT
Start: 2020-11-16 | End: 2020-12-07 | Stop reason: SDUPTHER

## 2020-11-16 NOTE — TELEPHONE ENCOUNTER
----- Message from Mililindsay Hutchison sent at 11/16/2020  8:32 AM CST -----  Contact: self   Requesting an RX refill or new RX.  Is this a refill or new RX:   RX name and strength:amLODIPine (NORVASC) 10 MG tablet   Route: Take 1 tablet (10 mg total) by mouth once daily. - Oral   Is this a 30 day or 90 day RX:   Pharmacy name and phone # (copy/paste from chart):  [address] [facility] [phone] (Phone)  [phone] (Fax)      Comments:          Requesting an RX refill or new RX.  Is this a refill or new RX:   RX name and strength:losartan (COZAAR) 100 MG tablet   Route: Take 1 tablet (100 mg total) by mouth once daily. - Oral   Is this a 30 day or 90 day RX:   Pharmacy name and phone # (copy/paste from chart):  [address] - [address] [facility] [phone] (Phone)  [phone] (Fax)  Comments:          Requesting an RX refill or new RX.  Is this a refill or new RX:   RX name and strength:VITAMIN D2 1,250 mcg (50,000 unit) capsule   Take 1 capsule by mouth once a week   Is this a 30 day or 90 day RX:   Pharmacy name and phone # (copy/paste from chart):  [address] - [address] [facility] [phone] (Phone)  [phone] (Fax)  Comments:

## 2020-12-07 DIAGNOSIS — I10 ESSENTIAL HYPERTENSION: ICD-10-CM

## 2020-12-07 RX ORDER — AMLODIPINE BESYLATE 10 MG/1
10 TABLET ORAL DAILY
Qty: 90 TABLET | Refills: 3 | Status: SHIPPED | OUTPATIENT
Start: 2020-12-07 | End: 2021-02-02 | Stop reason: SDUPTHER

## 2020-12-07 NOTE — TELEPHONE ENCOUNTER
----- Message from Angelo Stroud sent at 12/7/2020 10:38 AM CST -----  Is this a refill or new RX: Refill  RX name and strength: amLODIPine (NORVASC) 10 MG tablet and losartan (COZAAR) 100 MG tablet  Is this a 30 day or 90 day RX: 30  Pharmacy name and phone # [facility] [place] [place] - [address] [facility] [phone] (Phone) [phone] (Fax)    Comments: She said to add more refill

## 2021-01-19 ENCOUNTER — PATIENT MESSAGE (OUTPATIENT)
Dept: ADMINISTRATIVE | Facility: HOSPITAL | Age: 56
End: 2021-01-19

## 2021-01-19 ENCOUNTER — PATIENT OUTREACH (OUTPATIENT)
Dept: ADMINISTRATIVE | Facility: HOSPITAL | Age: 56
End: 2021-01-19

## 2021-01-19 DIAGNOSIS — E55.9 VITAMIN D DEFICIENCY DISEASE: ICD-10-CM

## 2021-01-19 DIAGNOSIS — R80.9 TYPE 2 DIABETES MELLITUS WITH MICROALBUMINURIA, WITHOUT LONG-TERM CURRENT USE OF INSULIN: ICD-10-CM

## 2021-01-19 DIAGNOSIS — Z11.4 ENCOUNTER FOR SCREENING FOR HUMAN IMMUNODEFICIENCY VIRUS (HIV): ICD-10-CM

## 2021-01-19 DIAGNOSIS — E11.29 TYPE 2 DIABETES MELLITUS WITH MICROALBUMINURIA, WITHOUT LONG-TERM CURRENT USE OF INSULIN: ICD-10-CM

## 2021-01-19 DIAGNOSIS — I15.2 HYPERTENSION ASSOCIATED WITH DIABETES: ICD-10-CM

## 2021-01-19 DIAGNOSIS — Z11.59 ENCOUNTER FOR HEPATITIS C SCREENING TEST FOR LOW RISK PATIENT: Primary | ICD-10-CM

## 2021-01-19 DIAGNOSIS — E11.59 HYPERTENSION ASSOCIATED WITH DIABETES: ICD-10-CM

## 2021-01-19 DIAGNOSIS — Z12.39 ENCOUNTER FOR SCREENING FOR MALIGNANT NEOPLASM OF BREAST, UNSPECIFIED SCREENING MODALITY: ICD-10-CM

## 2021-02-01 ENCOUNTER — LAB VISIT (OUTPATIENT)
Dept: LAB | Facility: HOSPITAL | Age: 56
End: 2021-02-01
Attending: INTERNAL MEDICINE
Payer: COMMERCIAL

## 2021-02-01 DIAGNOSIS — Z11.4 ENCOUNTER FOR SCREENING FOR HUMAN IMMUNODEFICIENCY VIRUS (HIV): ICD-10-CM

## 2021-02-01 DIAGNOSIS — Z11.59 ENCOUNTER FOR HEPATITIS C SCREENING TEST FOR LOW RISK PATIENT: ICD-10-CM

## 2021-02-01 PROCEDURE — 86703 HIV-1/HIV-2 1 RESULT ANTBDY: CPT

## 2021-02-01 PROCEDURE — 36415 COLL VENOUS BLD VENIPUNCTURE: CPT | Mod: PO

## 2021-02-01 PROCEDURE — 86803 HEPATITIS C AB TEST: CPT

## 2021-02-02 ENCOUNTER — OFFICE VISIT (OUTPATIENT)
Dept: INTERNAL MEDICINE | Facility: CLINIC | Age: 56
End: 2021-02-02
Payer: COMMERCIAL

## 2021-02-02 ENCOUNTER — HOSPITAL ENCOUNTER (OUTPATIENT)
Dept: RADIOLOGY | Facility: HOSPITAL | Age: 56
Discharge: HOME | End: 2021-02-02
Attending: INTERNAL MEDICINE
Payer: COMMERCIAL

## 2021-02-02 ENCOUNTER — PATIENT MESSAGE (OUTPATIENT)
Dept: PHARMACY | Facility: CLINIC | Age: 56
End: 2021-02-02

## 2021-02-02 ENCOUNTER — IMMUNIZATION (OUTPATIENT)
Dept: PHARMACY | Facility: CLINIC | Age: 56
End: 2021-02-02
Payer: COMMERCIAL

## 2021-02-02 VITALS
SYSTOLIC BLOOD PRESSURE: 135 MMHG | HEIGHT: 59 IN | DIASTOLIC BLOOD PRESSURE: 80 MMHG | BODY MASS INDEX: 41.33 KG/M2 | WEIGHT: 205 LBS

## 2021-02-02 VITALS — HEIGHT: 59 IN | WEIGHT: 190 LBS | BODY MASS INDEX: 38.3 KG/M2

## 2021-02-02 DIAGNOSIS — R80.9 TYPE 2 DIABETES MELLITUS WITH MICROALBUMINURIA, WITHOUT LONG-TERM CURRENT USE OF INSULIN: ICD-10-CM

## 2021-02-02 DIAGNOSIS — I10 ESSENTIAL HYPERTENSION: ICD-10-CM

## 2021-02-02 DIAGNOSIS — E66.01 MORBID OBESITY WITH BMI OF 40.0-44.9, ADULT: ICD-10-CM

## 2021-02-02 DIAGNOSIS — E78.5 HYPERLIPIDEMIA ASSOCIATED WITH TYPE 2 DIABETES MELLITUS: ICD-10-CM

## 2021-02-02 DIAGNOSIS — I15.2 HYPERTENSION ASSOCIATED WITH DIABETES: ICD-10-CM

## 2021-02-02 DIAGNOSIS — Z00.00 ANNUAL PHYSICAL EXAM: Primary | ICD-10-CM

## 2021-02-02 DIAGNOSIS — E11.69 HYPERLIPIDEMIA ASSOCIATED WITH TYPE 2 DIABETES MELLITUS: ICD-10-CM

## 2021-02-02 DIAGNOSIS — R06.83 SNORING: ICD-10-CM

## 2021-02-02 DIAGNOSIS — E78.2 MIXED HYPERLIPIDEMIA: ICD-10-CM

## 2021-02-02 DIAGNOSIS — E11.29 TYPE 2 DIABETES MELLITUS WITH MICROALBUMINURIA, WITHOUT LONG-TERM CURRENT USE OF INSULIN: ICD-10-CM

## 2021-02-02 DIAGNOSIS — E11.59 HYPERTENSION ASSOCIATED WITH DIABETES: ICD-10-CM

## 2021-02-02 DIAGNOSIS — Z12.39 ENCOUNTER FOR SCREENING FOR MALIGNANT NEOPLASM OF BREAST, UNSPECIFIED SCREENING MODALITY: ICD-10-CM

## 2021-02-02 DIAGNOSIS — M25.511 ACUTE PAIN OF RIGHT SHOULDER: ICD-10-CM

## 2021-02-02 PROBLEM — M54.2 NECK PAIN: Status: RESOLVED | Noted: 2019-02-22 | Resolved: 2021-02-02

## 2021-02-02 LAB
HCV AB SERPL QL IA: NEGATIVE
HIV 1+2 AB+HIV1 P24 AG SERPL QL IA: NEGATIVE

## 2021-02-02 PROCEDURE — 3044F PR MOST RECENT HEMOGLOBIN A1C LEVEL <7.0%: ICD-10-PCS | Mod: CPTII,S$GLB,, | Performed by: INTERNAL MEDICINE

## 2021-02-02 PROCEDURE — 3044F HG A1C LEVEL LT 7.0%: CPT | Mod: CPTII,S$GLB,, | Performed by: INTERNAL MEDICINE

## 2021-02-02 PROCEDURE — 3008F BODY MASS INDEX DOCD: CPT | Mod: CPTII,S$GLB,, | Performed by: INTERNAL MEDICINE

## 2021-02-02 PROCEDURE — 99999 PR PBB SHADOW E&M-EST. PATIENT-LVL III: CPT | Mod: PBBFAC,,, | Performed by: INTERNAL MEDICINE

## 2021-02-02 PROCEDURE — 99396 PREV VISIT EST AGE 40-64: CPT | Mod: S$GLB,,, | Performed by: INTERNAL MEDICINE

## 2021-02-02 PROCEDURE — 3075F PR MOST RECENT SYSTOLIC BLOOD PRESS GE 130-139MM HG: ICD-10-PCS | Mod: CPTII,S$GLB,, | Performed by: INTERNAL MEDICINE

## 2021-02-02 PROCEDURE — 1125F AMNT PAIN NOTED PAIN PRSNT: CPT | Mod: S$GLB,,, | Performed by: INTERNAL MEDICINE

## 2021-02-02 PROCEDURE — 99396 PR PREVENTIVE VISIT,EST,40-64: ICD-10-PCS | Mod: S$GLB,,, | Performed by: INTERNAL MEDICINE

## 2021-02-02 PROCEDURE — 77063 MAMMO DIGITAL SCREENING BILAT WITH TOMO: ICD-10-PCS | Mod: 26,,, | Performed by: RADIOLOGY

## 2021-02-02 PROCEDURE — 3079F PR MOST RECENT DIASTOLIC BLOOD PRESSURE 80-89 MM HG: ICD-10-PCS | Mod: CPTII,S$GLB,, | Performed by: INTERNAL MEDICINE

## 2021-02-02 PROCEDURE — 3079F DIAST BP 80-89 MM HG: CPT | Mod: CPTII,S$GLB,, | Performed by: INTERNAL MEDICINE

## 2021-02-02 PROCEDURE — 77067 SCR MAMMO BI INCL CAD: CPT | Mod: TC

## 2021-02-02 PROCEDURE — 3075F SYST BP GE 130 - 139MM HG: CPT | Mod: CPTII,S$GLB,, | Performed by: INTERNAL MEDICINE

## 2021-02-02 PROCEDURE — 77063 BREAST TOMOSYNTHESIS BI: CPT | Mod: 26,,, | Performed by: RADIOLOGY

## 2021-02-02 PROCEDURE — 77067 MAMMO DIGITAL SCREENING BILAT WITH TOMO: ICD-10-PCS | Mod: 26,,, | Performed by: RADIOLOGY

## 2021-02-02 PROCEDURE — 3008F PR BODY MASS INDEX (BMI) DOCUMENTED: ICD-10-PCS | Mod: CPTII,S$GLB,, | Performed by: INTERNAL MEDICINE

## 2021-02-02 PROCEDURE — 99999 PR PBB SHADOW E&M-EST. PATIENT-LVL III: ICD-10-PCS | Mod: PBBFAC,,, | Performed by: INTERNAL MEDICINE

## 2021-02-02 PROCEDURE — 77067 SCR MAMMO BI INCL CAD: CPT | Mod: 26,,, | Performed by: RADIOLOGY

## 2021-02-02 PROCEDURE — 1125F PR PAIN SEVERITY QUANTIFIED, PAIN PRESENT: ICD-10-PCS | Mod: S$GLB,,, | Performed by: INTERNAL MEDICINE

## 2021-02-02 RX ORDER — MELOXICAM 15 MG/1
15 TABLET ORAL DAILY PRN
Qty: 30 TABLET | Refills: 0 | Status: SHIPPED | OUTPATIENT
Start: 2021-02-02

## 2021-02-02 RX ORDER — PHENTERMINE HYDROCHLORIDE 37.5 MG/1
37.5 TABLET ORAL
Qty: 30 TABLET | Refills: 0 | Status: SHIPPED | OUTPATIENT
Start: 2021-02-02 | End: 2021-03-04

## 2021-02-02 RX ORDER — METFORMIN HYDROCHLORIDE 500 MG/1
500 TABLET, EXTENDED RELEASE ORAL
Qty: 90 TABLET | Refills: 3 | Status: SHIPPED | OUTPATIENT
Start: 2021-02-02 | End: 2022-02-25 | Stop reason: SDUPTHER

## 2021-02-02 RX ORDER — LOSARTAN POTASSIUM 100 MG/1
100 TABLET ORAL DAILY
Qty: 90 TABLET | Refills: 3 | Status: SHIPPED | OUTPATIENT
Start: 2021-02-02 | End: 2022-02-25 | Stop reason: SDUPTHER

## 2021-02-02 RX ORDER — ATORVASTATIN CALCIUM 20 MG/1
20 TABLET, FILM COATED ORAL DAILY
Qty: 90 TABLET | Refills: 3 | Status: SHIPPED | OUTPATIENT
Start: 2021-02-02 | End: 2021-08-05 | Stop reason: SDUPTHER

## 2021-02-02 RX ORDER — AMLODIPINE BESYLATE 10 MG/1
10 TABLET ORAL DAILY
Qty: 90 TABLET | Refills: 3 | Status: SHIPPED | OUTPATIENT
Start: 2021-02-02 | End: 2022-02-25 | Stop reason: SDUPTHER

## 2021-02-03 ENCOUNTER — PATIENT OUTREACH (OUTPATIENT)
Dept: ADMINISTRATIVE | Facility: HOSPITAL | Age: 56
End: 2021-02-03

## 2021-02-04 ENCOUNTER — PATIENT MESSAGE (OUTPATIENT)
Dept: INTERNAL MEDICINE | Facility: CLINIC | Age: 56
End: 2021-02-04

## 2021-02-04 ENCOUNTER — TELEPHONE (OUTPATIENT)
Dept: SLEEP MEDICINE | Facility: HOSPITAL | Age: 56
End: 2021-02-04

## 2021-02-04 DIAGNOSIS — E55.9 VITAMIN D DEFICIENCY DISEASE: Primary | ICD-10-CM

## 2021-03-04 ENCOUNTER — IMMUNIZATION (OUTPATIENT)
Dept: OBSTETRICS AND GYNECOLOGY | Facility: CLINIC | Age: 56
End: 2021-03-04
Payer: COMMERCIAL

## 2021-03-04 DIAGNOSIS — Z23 NEED FOR VACCINATION: Primary | ICD-10-CM

## 2021-03-04 PROCEDURE — 0001A COVID-19, MRNA, LNP-S, PF, 30 MCG/0.3 ML DOSE VACCINE: ICD-10-PCS | Mod: CV19,S$GLB,, | Performed by: FAMILY MEDICINE

## 2021-03-04 PROCEDURE — 91300 COVID-19, MRNA, LNP-S, PF, 30 MCG/0.3 ML DOSE VACCINE: ICD-10-PCS | Mod: S$GLB,,, | Performed by: FAMILY MEDICINE

## 2021-03-04 PROCEDURE — 0001A COVID-19, MRNA, LNP-S, PF, 30 MCG/0.3 ML DOSE VACCINE: CPT | Mod: CV19,S$GLB,, | Performed by: FAMILY MEDICINE

## 2021-03-04 PROCEDURE — 91300 COVID-19, MRNA, LNP-S, PF, 30 MCG/0.3 ML DOSE VACCINE: CPT | Mod: S$GLB,,, | Performed by: FAMILY MEDICINE

## 2021-03-25 ENCOUNTER — IMMUNIZATION (OUTPATIENT)
Dept: OBSTETRICS AND GYNECOLOGY | Facility: CLINIC | Age: 56
End: 2021-03-25
Payer: COMMERCIAL

## 2021-03-25 DIAGNOSIS — Z23 NEED FOR VACCINATION: Primary | ICD-10-CM

## 2021-03-25 PROCEDURE — 0002A COVID-19, MRNA, LNP-S, PF, 30 MCG/0.3 ML DOSE VACCINE: ICD-10-PCS | Mod: CV19,S$GLB,, | Performed by: FAMILY MEDICINE

## 2021-03-25 PROCEDURE — 91300 COVID-19, MRNA, LNP-S, PF, 30 MCG/0.3 ML DOSE VACCINE: ICD-10-PCS | Mod: S$GLB,,, | Performed by: FAMILY MEDICINE

## 2021-03-25 PROCEDURE — 91300 COVID-19, MRNA, LNP-S, PF, 30 MCG/0.3 ML DOSE VACCINE: CPT | Mod: S$GLB,,, | Performed by: FAMILY MEDICINE

## 2021-03-25 PROCEDURE — 0002A COVID-19, MRNA, LNP-S, PF, 30 MCG/0.3 ML DOSE VACCINE: CPT | Mod: CV19,S$GLB,, | Performed by: FAMILY MEDICINE

## 2021-04-05 ENCOUNTER — IMMUNIZATION (OUTPATIENT)
Dept: PHARMACY | Facility: CLINIC | Age: 56
End: 2021-04-05
Payer: COMMERCIAL

## 2021-05-12 DIAGNOSIS — E11.9 TYPE 2 DIABETES MELLITUS WITHOUT COMPLICATION: ICD-10-CM

## 2021-07-06 ENCOUNTER — PATIENT MESSAGE (OUTPATIENT)
Dept: ADMINISTRATIVE | Facility: HOSPITAL | Age: 56
End: 2021-07-06

## 2021-07-12 ENCOUNTER — TELEPHONE (OUTPATIENT)
Dept: INTERNAL MEDICINE | Facility: CLINIC | Age: 56
End: 2021-07-12

## 2021-07-12 RX ORDER — ERGOCALCIFEROL 1.25 MG/1
50000 CAPSULE ORAL
COMMUNITY
Start: 2021-02-06 | End: 2022-07-07 | Stop reason: SDUPTHER

## 2021-07-12 NOTE — TRANSFER OF CARE
"Anesthesia Transfer of Care Note    Patient: Amanda Ponce    Procedure(s) Performed: Procedure(s) (LRB):  COLONOSCOPY (N/A)    Patient location: PACU    Anesthesia Type: general    Transport from OR: Transported from OR on room air with adequate spontaneous ventilation    Post pain: adequate analgesia    Post assessment: tolerated procedure well and no apparent anesthetic complications    Post vital signs: stable    Level of consciousness: sedated    Nausea/Vomiting: no nausea/vomiting    Complications: none    Transfer of care protocol was followed      Last vitals:   Visit Vitals  /68 (BP Location: Left arm, Patient Position: Sitting)   Pulse 100   Temp 37.1 °C (98.8 °F) (Oral)   Resp 12   Ht 4' 11" (1.499 m)   Wt 81.6 kg (180 lb)   SpO2 98%   Breastfeeding? No   BMI 36.36 kg/m²     " [Time Spent: ___ minutes] : I have spent [unfilled] minutes of time on the encounter.

## 2021-08-03 ENCOUNTER — LAB VISIT (OUTPATIENT)
Dept: LAB | Facility: HOSPITAL | Age: 56
End: 2021-08-03
Attending: INTERNAL MEDICINE
Payer: COMMERCIAL

## 2021-08-03 DIAGNOSIS — R80.9 TYPE 2 DIABETES MELLITUS WITH MICROALBUMINURIA, WITHOUT LONG-TERM CURRENT USE OF INSULIN: ICD-10-CM

## 2021-08-03 DIAGNOSIS — E55.9 VITAMIN D DEFICIENCY DISEASE: ICD-10-CM

## 2021-08-03 DIAGNOSIS — E11.29 TYPE 2 DIABETES MELLITUS WITH MICROALBUMINURIA, WITHOUT LONG-TERM CURRENT USE OF INSULIN: ICD-10-CM

## 2021-08-03 DIAGNOSIS — E11.69 HYPERLIPIDEMIA ASSOCIATED WITH TYPE 2 DIABETES MELLITUS: ICD-10-CM

## 2021-08-03 DIAGNOSIS — E78.5 HYPERLIPIDEMIA ASSOCIATED WITH TYPE 2 DIABETES MELLITUS: ICD-10-CM

## 2021-08-03 LAB
25(OH)D3+25(OH)D2 SERPL-MCNC: 27 NG/ML (ref 30–96)
ALBUMIN SERPL BCP-MCNC: 3.9 G/DL (ref 3.5–5.2)
ALP SERPL-CCNC: 87 U/L (ref 55–135)
ALT SERPL W/O P-5'-P-CCNC: 24 U/L (ref 10–44)
ANION GAP SERPL CALC-SCNC: 7 MMOL/L (ref 8–16)
AST SERPL-CCNC: 19 U/L (ref 10–40)
BILIRUB SERPL-MCNC: 0.3 MG/DL (ref 0.1–1)
BUN SERPL-MCNC: 18 MG/DL (ref 6–20)
CALCIUM SERPL-MCNC: 10.4 MG/DL (ref 8.7–10.5)
CHLORIDE SERPL-SCNC: 106 MMOL/L (ref 95–110)
CHOLEST SERPL-MCNC: 173 MG/DL (ref 120–199)
CHOLEST/HDLC SERPL: 4.3 {RATIO} (ref 2–5)
CO2 SERPL-SCNC: 29 MMOL/L (ref 23–29)
CREAT SERPL-MCNC: 0.9 MG/DL (ref 0.5–1.4)
EST. GFR  (AFRICAN AMERICAN): >60 ML/MIN/1.73 M^2
EST. GFR  (NON AFRICAN AMERICAN): >60 ML/MIN/1.73 M^2
ESTIMATED AVG GLUCOSE: 131 MG/DL (ref 68–131)
GLUCOSE SERPL-MCNC: 102 MG/DL (ref 70–110)
HBA1C MFR BLD: 6.2 % (ref 4–5.6)
HDLC SERPL-MCNC: 40 MG/DL (ref 40–75)
HDLC SERPL: 23.1 % (ref 20–50)
LDLC SERPL CALC-MCNC: 110.6 MG/DL (ref 63–159)
NONHDLC SERPL-MCNC: 133 MG/DL
POTASSIUM SERPL-SCNC: 4.1 MMOL/L (ref 3.5–5.1)
PROT SERPL-MCNC: 9 G/DL (ref 6–8.4)
SODIUM SERPL-SCNC: 142 MMOL/L (ref 136–145)
TRIGL SERPL-MCNC: 112 MG/DL (ref 30–150)

## 2021-08-03 PROCEDURE — 80061 LIPID PANEL: CPT | Performed by: INTERNAL MEDICINE

## 2021-08-03 PROCEDURE — 82306 VITAMIN D 25 HYDROXY: CPT | Performed by: INTERNAL MEDICINE

## 2021-08-03 PROCEDURE — 83036 HEMOGLOBIN GLYCOSYLATED A1C: CPT | Performed by: INTERNAL MEDICINE

## 2021-08-03 PROCEDURE — 80053 COMPREHEN METABOLIC PANEL: CPT | Performed by: INTERNAL MEDICINE

## 2021-08-03 PROCEDURE — 36415 COLL VENOUS BLD VENIPUNCTURE: CPT | Mod: PO | Performed by: INTERNAL MEDICINE

## 2021-08-05 DIAGNOSIS — E78.2 MIXED HYPERLIPIDEMIA: ICD-10-CM

## 2021-08-05 DIAGNOSIS — R77.9 ELEVATED BLOOD PROTEIN: Primary | ICD-10-CM

## 2021-08-05 RX ORDER — ATORVASTATIN CALCIUM 20 MG/1
20 TABLET, FILM COATED ORAL DAILY
Qty: 90 TABLET | Refills: 3 | Status: SHIPPED | OUTPATIENT
Start: 2021-08-05 | End: 2022-02-25 | Stop reason: SDUPTHER

## 2021-08-13 ENCOUNTER — LAB VISIT (OUTPATIENT)
Dept: LAB | Facility: HOSPITAL | Age: 56
End: 2021-08-13
Attending: INTERNAL MEDICINE
Payer: COMMERCIAL

## 2021-08-13 DIAGNOSIS — R77.9 ELEVATED BLOOD PROTEIN: ICD-10-CM

## 2021-08-13 PROCEDURE — 86334 IMMUNOFIX E-PHORESIS SERUM: CPT | Mod: 26,,, | Performed by: PATHOLOGY

## 2021-08-13 PROCEDURE — 84165 PROTEIN E-PHORESIS SERUM: CPT | Performed by: INTERNAL MEDICINE

## 2021-08-13 PROCEDURE — 36415 COLL VENOUS BLD VENIPUNCTURE: CPT | Mod: PO | Performed by: INTERNAL MEDICINE

## 2021-08-13 PROCEDURE — 86334 PATHOLOGIST INTERPRETATION IFE: ICD-10-PCS | Mod: 26,,, | Performed by: PATHOLOGY

## 2021-08-13 PROCEDURE — 84165 PROTEIN E-PHORESIS SERUM: CPT | Mod: 26,,, | Performed by: PATHOLOGY

## 2021-08-13 PROCEDURE — 84165 PATHOLOGIST INTERPRETATION SPE: ICD-10-PCS | Mod: 26,,, | Performed by: PATHOLOGY

## 2021-08-13 PROCEDURE — 86334 IMMUNOFIX E-PHORESIS SERUM: CPT | Performed by: INTERNAL MEDICINE

## 2021-08-16 LAB
ALBUMIN SERPL ELPH-MCNC: 3.8 G/DL (ref 3.35–5.55)
ALPHA1 GLOB SERPL ELPH-MCNC: 0.32 G/DL (ref 0.17–0.41)
ALPHA2 GLOB SERPL ELPH-MCNC: 0.75 G/DL (ref 0.43–0.99)
B-GLOBULIN SERPL ELPH-MCNC: 1.64 G/DL (ref 0.5–1.1)
GAMMA GLOB SERPL ELPH-MCNC: 1.79 G/DL (ref 0.67–1.58)
INTERPRETATION SERPL IFE-IMP: NORMAL
PROT SERPL-MCNC: 8.3 G/DL (ref 6–8.4)

## 2021-08-17 ENCOUNTER — TELEPHONE (OUTPATIENT)
Dept: INTERNAL MEDICINE | Facility: CLINIC | Age: 56
End: 2021-08-17

## 2021-08-17 DIAGNOSIS — R77.8 ELEVATED TOTAL PROTEIN: Primary | ICD-10-CM

## 2021-08-17 LAB
PATHOLOGIST INTERPRETATION IFE: NORMAL
PATHOLOGIST INTERPRETATION SPE: NORMAL

## 2021-09-22 ENCOUNTER — LAB VISIT (OUTPATIENT)
Dept: LAB | Facility: HOSPITAL | Age: 56
End: 2021-09-22
Payer: COMMERCIAL

## 2021-09-22 ENCOUNTER — OFFICE VISIT (OUTPATIENT)
Dept: HEMATOLOGY/ONCOLOGY | Facility: CLINIC | Age: 56
End: 2021-09-22
Payer: COMMERCIAL

## 2021-09-22 VITALS
SYSTOLIC BLOOD PRESSURE: 135 MMHG | HEIGHT: 59 IN | TEMPERATURE: 99 F | WEIGHT: 194.31 LBS | DIASTOLIC BLOOD PRESSURE: 86 MMHG | RESPIRATION RATE: 16 BRPM | BODY MASS INDEX: 39.17 KG/M2 | OXYGEN SATURATION: 98 % | HEART RATE: 104 BPM

## 2021-09-22 DIAGNOSIS — R77.8 ELEVATED TOTAL PROTEIN: ICD-10-CM

## 2021-09-22 LAB
BASOPHILS # BLD AUTO: 0.02 K/UL (ref 0–0.2)
BASOPHILS NFR BLD: 0.3 % (ref 0–1.9)
DIFFERENTIAL METHOD: ABNORMAL
EOSINOPHIL # BLD AUTO: 0.3 K/UL (ref 0–0.5)
EOSINOPHIL NFR BLD: 4.8 % (ref 0–8)
ERYTHROCYTE [DISTWIDTH] IN BLOOD BY AUTOMATED COUNT: 15.2 % (ref 11.5–14.5)
HBV CORE AB SERPL QL IA: NEGATIVE
HBV SURFACE AB SER-ACNC: NEGATIVE M[IU]/ML
HBV SURFACE AG SERPL QL IA: NEGATIVE
HCT VFR BLD AUTO: 37.5 % (ref 37–48.5)
HGB BLD-MCNC: 12.1 G/DL (ref 12–16)
IGA SERPL-MCNC: 354 MG/DL (ref 40–350)
IGG SERPL-MCNC: 1958 MG/DL (ref 650–1600)
IGM SERPL-MCNC: 171 MG/DL (ref 50–300)
IMM GRANULOCYTES # BLD AUTO: 0.02 K/UL (ref 0–0.04)
IMM GRANULOCYTES NFR BLD AUTO: 0.3 % (ref 0–0.5)
LYMPHOCYTES # BLD AUTO: 1.7 K/UL (ref 1–4.8)
LYMPHOCYTES NFR BLD: 25 % (ref 18–48)
MCH RBC QN AUTO: 28.4 PG (ref 27–31)
MCHC RBC AUTO-ENTMCNC: 32.3 G/DL (ref 32–36)
MCV RBC AUTO: 88 FL (ref 82–98)
MONOCYTES # BLD AUTO: 0.4 K/UL (ref 0.3–1)
MONOCYTES NFR BLD: 5.8 % (ref 4–15)
NEUTROPHILS # BLD AUTO: 4.3 K/UL (ref 1.8–7.7)
NEUTROPHILS NFR BLD: 63.8 % (ref 38–73)
NRBC BLD-RTO: 0 /100 WBC
PLATELET # BLD AUTO: 318 K/UL (ref 150–450)
PMV BLD AUTO: 10.7 FL (ref 9.2–12.9)
RBC # BLD AUTO: 4.26 M/UL (ref 4–5.4)
WBC # BLD AUTO: 6.67 K/UL (ref 3.9–12.7)

## 2021-09-22 PROCEDURE — 3044F HG A1C LEVEL LT 7.0%: CPT | Mod: CPTII,S$GLB,, | Performed by: INTERNAL MEDICINE

## 2021-09-22 PROCEDURE — 3008F BODY MASS INDEX DOCD: CPT | Mod: CPTII,S$GLB,, | Performed by: INTERNAL MEDICINE

## 2021-09-22 PROCEDURE — 3075F SYST BP GE 130 - 139MM HG: CPT | Mod: CPTII,S$GLB,, | Performed by: INTERNAL MEDICINE

## 2021-09-22 PROCEDURE — 3079F DIAST BP 80-89 MM HG: CPT | Mod: CPTII,S$GLB,, | Performed by: INTERNAL MEDICINE

## 2021-09-22 PROCEDURE — 1159F PR MEDICATION LIST DOCUMENTED IN MEDICAL RECORD: ICD-10-PCS | Mod: CPTII,S$GLB,, | Performed by: INTERNAL MEDICINE

## 2021-09-22 PROCEDURE — 3061F NEG MICROALBUMINURIA REV: CPT | Mod: CPTII,S$GLB,, | Performed by: INTERNAL MEDICINE

## 2021-09-22 PROCEDURE — 4010F PR ACE/ARB THEARPY RXD/TAKEN: ICD-10-PCS | Mod: CPTII,S$GLB,, | Performed by: INTERNAL MEDICINE

## 2021-09-22 PROCEDURE — 1159F MED LIST DOCD IN RCRD: CPT | Mod: CPTII,S$GLB,, | Performed by: INTERNAL MEDICINE

## 2021-09-22 PROCEDURE — 3066F NEPHROPATHY DOC TX: CPT | Mod: CPTII,S$GLB,, | Performed by: INTERNAL MEDICINE

## 2021-09-22 PROCEDURE — 82784 ASSAY IGA/IGD/IGG/IGM EACH: CPT | Mod: 59 | Performed by: INTERNAL MEDICINE

## 2021-09-22 PROCEDURE — 99204 OFFICE O/P NEW MOD 45 MIN: CPT | Mod: S$GLB,,, | Performed by: INTERNAL MEDICINE

## 2021-09-22 PROCEDURE — 3079F PR MOST RECENT DIASTOLIC BLOOD PRESSURE 80-89 MM HG: ICD-10-PCS | Mod: CPTII,S$GLB,, | Performed by: INTERNAL MEDICINE

## 2021-09-22 PROCEDURE — 86706 HEP B SURFACE ANTIBODY: CPT | Performed by: INTERNAL MEDICINE

## 2021-09-22 PROCEDURE — 86704 HEP B CORE ANTIBODY TOTAL: CPT | Performed by: INTERNAL MEDICINE

## 2021-09-22 PROCEDURE — 99204 PR OFFICE/OUTPT VISIT, NEW, LEVL IV, 45-59 MIN: ICD-10-PCS | Mod: S$GLB,,, | Performed by: INTERNAL MEDICINE

## 2021-09-22 PROCEDURE — 3075F PR MOST RECENT SYSTOLIC BLOOD PRESS GE 130-139MM HG: ICD-10-PCS | Mod: CPTII,S$GLB,, | Performed by: INTERNAL MEDICINE

## 2021-09-22 PROCEDURE — 3008F PR BODY MASS INDEX (BMI) DOCUMENTED: ICD-10-PCS | Mod: CPTII,S$GLB,, | Performed by: INTERNAL MEDICINE

## 2021-09-22 PROCEDURE — 83520 IMMUNOASSAY QUANT NOS NONAB: CPT | Mod: 59 | Performed by: INTERNAL MEDICINE

## 2021-09-22 PROCEDURE — 87340 HEPATITIS B SURFACE AG IA: CPT | Performed by: INTERNAL MEDICINE

## 2021-09-22 PROCEDURE — 3061F PR NEG MICROALBUMINURIA RESULT DOCUMENTED/REVIEW: ICD-10-PCS | Mod: CPTII,S$GLB,, | Performed by: INTERNAL MEDICINE

## 2021-09-22 PROCEDURE — 36415 COLL VENOUS BLD VENIPUNCTURE: CPT | Performed by: INTERNAL MEDICINE

## 2021-09-22 PROCEDURE — 3066F PR DOCUMENTATION OF TREATMENT FOR NEPHROPATHY: ICD-10-PCS | Mod: CPTII,S$GLB,, | Performed by: INTERNAL MEDICINE

## 2021-09-22 PROCEDURE — 99999 PR PBB SHADOW E&M-EST. PATIENT-LVL IV: ICD-10-PCS | Mod: PBBFAC,,, | Performed by: INTERNAL MEDICINE

## 2021-09-22 PROCEDURE — 3044F PR MOST RECENT HEMOGLOBIN A1C LEVEL <7.0%: ICD-10-PCS | Mod: CPTII,S$GLB,, | Performed by: INTERNAL MEDICINE

## 2021-09-22 PROCEDURE — 99999 PR PBB SHADOW E&M-EST. PATIENT-LVL IV: CPT | Mod: PBBFAC,,, | Performed by: INTERNAL MEDICINE

## 2021-09-22 PROCEDURE — 85025 COMPLETE CBC W/AUTO DIFF WBC: CPT | Performed by: INTERNAL MEDICINE

## 2021-09-22 PROCEDURE — 4010F ACE/ARB THERAPY RXD/TAKEN: CPT | Mod: CPTII,S$GLB,, | Performed by: INTERNAL MEDICINE

## 2021-09-23 LAB
KAPPA LC SER QL IA: 5.45 MG/DL (ref 0.33–1.94)
KAPPA LC/LAMBDA SER IA: 2.37 (ref 0.26–1.65)
LAMBDA LC SER QL IA: 2.3 MG/DL (ref 0.57–2.63)

## 2021-10-02 ENCOUNTER — OFFICE VISIT (OUTPATIENT)
Dept: URGENT CARE | Facility: CLINIC | Age: 56
End: 2021-10-02
Payer: COMMERCIAL

## 2021-10-02 VITALS
HEIGHT: 59 IN | SYSTOLIC BLOOD PRESSURE: 148 MMHG | DIASTOLIC BLOOD PRESSURE: 81 MMHG | WEIGHT: 194.44 LBS | BODY MASS INDEX: 39.2 KG/M2 | HEART RATE: 100 BPM | TEMPERATURE: 98 F | RESPIRATION RATE: 20 BRPM | OXYGEN SATURATION: 95 %

## 2021-10-02 DIAGNOSIS — S39.012A LUMBAR STRAIN, INITIAL ENCOUNTER: Primary | ICD-10-CM

## 2021-10-02 PROCEDURE — 1160F PR REVIEW ALL MEDS BY PRESCRIBER/CLIN PHARMACIST DOCUMENTED: ICD-10-PCS | Mod: CPTII,S$GLB,, | Performed by: PHYSICIAN ASSISTANT

## 2021-10-02 PROCEDURE — 3077F PR MOST RECENT SYSTOLIC BLOOD PRESSURE >= 140 MM HG: ICD-10-PCS | Mod: CPTII,S$GLB,, | Performed by: PHYSICIAN ASSISTANT

## 2021-10-02 PROCEDURE — 3061F PR NEG MICROALBUMINURIA RESULT DOCUMENTED/REVIEW: ICD-10-PCS | Mod: CPTII,S$GLB,, | Performed by: PHYSICIAN ASSISTANT

## 2021-10-02 PROCEDURE — 3061F NEG MICROALBUMINURIA REV: CPT | Mod: CPTII,S$GLB,, | Performed by: PHYSICIAN ASSISTANT

## 2021-10-02 PROCEDURE — 99214 OFFICE O/P EST MOD 30 MIN: CPT | Mod: 25,S$GLB,, | Performed by: PHYSICIAN ASSISTANT

## 2021-10-02 PROCEDURE — 3008F BODY MASS INDEX DOCD: CPT | Mod: CPTII,S$GLB,, | Performed by: PHYSICIAN ASSISTANT

## 2021-10-02 PROCEDURE — 3044F PR MOST RECENT HEMOGLOBIN A1C LEVEL <7.0%: ICD-10-PCS | Mod: CPTII,S$GLB,, | Performed by: PHYSICIAN ASSISTANT

## 2021-10-02 PROCEDURE — 3077F SYST BP >= 140 MM HG: CPT | Mod: CPTII,S$GLB,, | Performed by: PHYSICIAN ASSISTANT

## 2021-10-02 PROCEDURE — 1160F RVW MEDS BY RX/DR IN RCRD: CPT | Mod: CPTII,S$GLB,, | Performed by: PHYSICIAN ASSISTANT

## 2021-10-02 PROCEDURE — 96372 PR INJECTION,THERAP/PROPH/DIAG2ST, IM OR SUBCUT: ICD-10-PCS | Mod: S$GLB,,, | Performed by: PHYSICIAN ASSISTANT

## 2021-10-02 PROCEDURE — 3008F PR BODY MASS INDEX (BMI) DOCUMENTED: ICD-10-PCS | Mod: CPTII,S$GLB,, | Performed by: PHYSICIAN ASSISTANT

## 2021-10-02 PROCEDURE — 1159F MED LIST DOCD IN RCRD: CPT | Mod: CPTII,S$GLB,, | Performed by: PHYSICIAN ASSISTANT

## 2021-10-02 PROCEDURE — 4010F ACE/ARB THERAPY RXD/TAKEN: CPT | Mod: CPTII,S$GLB,, | Performed by: PHYSICIAN ASSISTANT

## 2021-10-02 PROCEDURE — 3066F PR DOCUMENTATION OF TREATMENT FOR NEPHROPATHY: ICD-10-PCS | Mod: CPTII,S$GLB,, | Performed by: PHYSICIAN ASSISTANT

## 2021-10-02 PROCEDURE — 96372 THER/PROPH/DIAG INJ SC/IM: CPT | Mod: S$GLB,,, | Performed by: PHYSICIAN ASSISTANT

## 2021-10-02 PROCEDURE — 3066F NEPHROPATHY DOC TX: CPT | Mod: CPTII,S$GLB,, | Performed by: PHYSICIAN ASSISTANT

## 2021-10-02 PROCEDURE — 99214 PR OFFICE/OUTPT VISIT, EST, LEVL IV, 30-39 MIN: ICD-10-PCS | Mod: 25,S$GLB,, | Performed by: PHYSICIAN ASSISTANT

## 2021-10-02 PROCEDURE — 1159F PR MEDICATION LIST DOCUMENTED IN MEDICAL RECORD: ICD-10-PCS | Mod: CPTII,S$GLB,, | Performed by: PHYSICIAN ASSISTANT

## 2021-10-02 PROCEDURE — 4010F PR ACE/ARB THEARPY RXD/TAKEN: ICD-10-PCS | Mod: CPTII,S$GLB,, | Performed by: PHYSICIAN ASSISTANT

## 2021-10-02 PROCEDURE — 3044F HG A1C LEVEL LT 7.0%: CPT | Mod: CPTII,S$GLB,, | Performed by: PHYSICIAN ASSISTANT

## 2021-10-02 PROCEDURE — 3079F DIAST BP 80-89 MM HG: CPT | Mod: CPTII,S$GLB,, | Performed by: PHYSICIAN ASSISTANT

## 2021-10-02 PROCEDURE — 3079F PR MOST RECENT DIASTOLIC BLOOD PRESSURE 80-89 MM HG: ICD-10-PCS | Mod: CPTII,S$GLB,, | Performed by: PHYSICIAN ASSISTANT

## 2021-10-02 RX ORDER — METHOCARBAMOL 500 MG/1
500 TABLET, FILM COATED ORAL NIGHTLY PRN
Qty: 10 TABLET | Refills: 0 | Status: SHIPPED | OUTPATIENT
Start: 2021-10-02 | End: 2022-07-05

## 2021-10-02 RX ORDER — KETOROLAC TROMETHAMINE 30 MG/ML
30 INJECTION, SOLUTION INTRAMUSCULAR; INTRAVENOUS
Status: COMPLETED | OUTPATIENT
Start: 2021-10-02 | End: 2021-10-02

## 2021-10-02 RX ORDER — NAPROXEN 500 MG/1
500 TABLET ORAL 2 TIMES DAILY WITH MEALS
Qty: 20 TABLET | Refills: 0 | Status: SHIPPED | OUTPATIENT
Start: 2021-10-02 | End: 2021-10-12

## 2021-10-02 RX ORDER — KETOROLAC TROMETHAMINE 30 MG/ML
30 INJECTION, SOLUTION INTRAMUSCULAR; INTRAVENOUS
Status: DISCONTINUED | OUTPATIENT
Start: 2021-10-02 | End: 2021-10-02

## 2021-10-02 RX ADMIN — KETOROLAC TROMETHAMINE 30 MG: 30 INJECTION, SOLUTION INTRAMUSCULAR; INTRAVENOUS at 05:10

## 2021-10-23 ENCOUNTER — IMMUNIZATION (OUTPATIENT)
Dept: PRIMARY CARE CLINIC | Facility: CLINIC | Age: 56
End: 2021-10-23
Payer: COMMERCIAL

## 2021-10-23 DIAGNOSIS — Z23 NEED FOR VACCINATION: Primary | ICD-10-CM

## 2021-10-23 PROCEDURE — 0003A COVID-19, MRNA, LNP-S, PF, 30 MCG/0.3 ML DOSE VACCINE: CPT | Mod: CV19,PBBFAC | Performed by: INTERNAL MEDICINE

## 2021-10-23 PROCEDURE — 91300 COVID-19, MRNA, LNP-S, PF, 30 MCG/0.3 ML DOSE VACCINE: CPT | Mod: PBBFAC | Performed by: INTERNAL MEDICINE

## 2021-10-30 ENCOUNTER — CLINICAL SUPPORT (OUTPATIENT)
Dept: URGENT CARE | Facility: CLINIC | Age: 56
End: 2021-10-30

## 2021-10-30 DIAGNOSIS — Z23 FLU VACCINE NEED: Primary | ICD-10-CM

## 2021-10-30 PROCEDURE — 90686 IIV4 VACC NO PRSV 0.5 ML IM: CPT | Mod: S$GLB,,, | Performed by: NURSE PRACTITIONER

## 2021-10-30 PROCEDURE — 90686 FLU VACCINE (QUAD) GREATER THAN OR EQUAL TO 3YO PRESERVATIVE FREE IM: ICD-10-PCS | Mod: S$GLB,,, | Performed by: NURSE PRACTITIONER

## 2021-10-30 PROCEDURE — 90471 IMMUNIZATION ADMIN: CPT | Mod: S$GLB,,, | Performed by: NURSE PRACTITIONER

## 2021-10-30 PROCEDURE — 90471 FLU VACCINE (QUAD) GREATER THAN OR EQUAL TO 3YO PRESERVATIVE FREE IM: ICD-10-PCS | Mod: S$GLB,,, | Performed by: NURSE PRACTITIONER

## 2021-11-09 ENCOUNTER — CLINICAL SUPPORT (OUTPATIENT)
Dept: URGENT CARE | Facility: CLINIC | Age: 56
End: 2021-11-09
Payer: COMMERCIAL

## 2021-11-09 DIAGNOSIS — Z20.822 ENCOUNTER FOR LABORATORY TESTING FOR COVID-19 VIRUS: Primary | ICD-10-CM

## 2021-11-09 LAB
CTP QC/QA: YES
SARS-COV-2 RDRP RESP QL NAA+PROBE: NEGATIVE

## 2021-11-09 PROCEDURE — U0002 COVID-19 LAB TEST NON-CDC: HCPCS | Mod: QW,S$GLB,, | Performed by: PHYSICIAN ASSISTANT

## 2021-11-09 PROCEDURE — U0002: ICD-10-PCS | Mod: QW,S$GLB,, | Performed by: PHYSICIAN ASSISTANT

## 2021-11-19 ENCOUNTER — PATIENT MESSAGE (OUTPATIENT)
Dept: HEMATOLOGY/ONCOLOGY | Facility: CLINIC | Age: 56
End: 2021-11-19
Payer: COMMERCIAL

## 2021-11-23 DIAGNOSIS — R77.8 ELEVATED TOTAL PROTEIN: Primary | ICD-10-CM

## 2022-02-25 DIAGNOSIS — E11.29 TYPE 2 DIABETES MELLITUS WITH MICROALBUMINURIA, WITHOUT LONG-TERM CURRENT USE OF INSULIN: ICD-10-CM

## 2022-02-25 DIAGNOSIS — R80.9 TYPE 2 DIABETES MELLITUS WITH MICROALBUMINURIA, WITHOUT LONG-TERM CURRENT USE OF INSULIN: ICD-10-CM

## 2022-02-25 DIAGNOSIS — E78.2 MIXED HYPERLIPIDEMIA: ICD-10-CM

## 2022-02-25 DIAGNOSIS — I10 ESSENTIAL HYPERTENSION: ICD-10-CM

## 2022-02-25 RX ORDER — ATORVASTATIN CALCIUM 20 MG/1
20 TABLET, FILM COATED ORAL DAILY
Qty: 90 TABLET | Refills: 3 | Status: SHIPPED | OUTPATIENT
Start: 2022-02-25 | End: 2022-07-07 | Stop reason: SDUPTHER

## 2022-02-25 RX ORDER — AMLODIPINE BESYLATE 10 MG/1
10 TABLET ORAL DAILY
Qty: 90 TABLET | Refills: 3 | Status: SHIPPED | OUTPATIENT
Start: 2022-02-25 | End: 2023-02-20 | Stop reason: SDUPTHER

## 2022-02-25 RX ORDER — LOSARTAN POTASSIUM 100 MG/1
100 TABLET ORAL DAILY
Qty: 90 TABLET | Refills: 3 | Status: SHIPPED | OUTPATIENT
Start: 2022-02-25 | End: 2023-02-27 | Stop reason: SDUPTHER

## 2022-02-25 RX ORDER — METFORMIN HYDROCHLORIDE 500 MG/1
500 TABLET, EXTENDED RELEASE ORAL
Qty: 90 TABLET | Refills: 3 | Status: SHIPPED | OUTPATIENT
Start: 2022-02-25 | End: 2023-06-15

## 2022-02-25 NOTE — TELEPHONE ENCOUNTER
----- Message from Aida Galeano sent at 2/25/2022 12:21 PM CST -----  Contact: Self/[phone]  Requesting an RX refill or new RX.  Is this a refill or new RX: New  RX name and strength :  losartan (COZAAR) 100 MG tablet  Is this a 30 day or 90 day RX: [address] [address]  Phone: [phone] Fax: [phone]   The doctors have asked that we provide their patients with the following 2 reminders -- prescription refills can take up to 72 hours, and a friendly reminder that in the future you can use your [facility] account to request refills: yes      Requesting an RX refill or new RX.  Is this a refill or new RX: New  RX name and strength :  amLODIPine (NORVASC) 10 MG tablet  Is this a 30 day or 90 day RX: [address] [place], [place] [address]  Phone: [phone] Fax: [phone]  The doctors have asked that we provide their patients with the following 2 reminders -- prescription refills can take up to 72 hours, and a friendly reminder that in the future you can use your [facility] account to request refills: yes     Requesting an RX refill or new RX.  Is this a refill or new RX: New  RX name and strength :  atorvastatin (LIPITOR) 20 MG tablet  Is this a 30 day or 90 day RX: [address] [place], [place] [address] [place] [id number]  Phone: [phone] Fax: [phone]      The doctors have asked that we provide their patients with the following 2 reminders -- prescription refills can take up to 72 hours, and a friendly reminder that in the future you can use your [facility] account to request refills: yes

## 2022-02-25 NOTE — TELEPHONE ENCOUNTER
Care Due:                  Date            Visit Type   Department     Provider  --------------------------------------------------------------------------------                                MYCHART                              ANNUAL                              CHECKUP/PHY  [facility] INTERNAL  Last Visit: 02-      S            AMILCAR Sullivan                              EP -                              PRIMARY      [facility] INTERNAL  Next Visit: 07-      CARE ([facility])   MEDICINE       Malini Sullivan                                                            Last  Test          Frequency    Reason                     Performed    Due Date  --------------------------------------------------------------------------------    Office Visit  12 months..  amLODIPine...............  02- 01-    Powered by [employer]. Reference number: [id number].   2/25/2022 12:52:52 PM CST

## 2022-03-11 ENCOUNTER — LAB VISIT (OUTPATIENT)
Dept: LAB | Facility: HOSPITAL | Age: 57
End: 2022-03-11
Attending: INTERNAL MEDICINE
Payer: COMMERCIAL

## 2022-03-11 DIAGNOSIS — R77.8 ELEVATED TOTAL PROTEIN: ICD-10-CM

## 2022-03-11 LAB
ALBUMIN SERPL BCP-MCNC: 3.8 G/DL (ref 3.5–5.2)
ALP SERPL-CCNC: 77 U/L (ref 55–135)
ALT SERPL W/O P-5'-P-CCNC: 22 U/L (ref 10–44)
ANION GAP SERPL CALC-SCNC: 9 MMOL/L (ref 8–16)
AST SERPL-CCNC: 21 U/L (ref 10–40)
BASOPHILS # BLD AUTO: 0.01 K/UL (ref 0–0.2)
BASOPHILS NFR BLD: 0.2 % (ref 0–1.9)
BILIRUB SERPL-MCNC: 0.4 MG/DL (ref 0.1–1)
BUN SERPL-MCNC: 13 MG/DL (ref 6–20)
CALCIUM SERPL-MCNC: 9.4 MG/DL (ref 8.7–10.5)
CHLORIDE SERPL-SCNC: 102 MMOL/L (ref 95–110)
CO2 SERPL-SCNC: 29 MMOL/L (ref 23–29)
CREAT SERPL-MCNC: 0.7 MG/DL (ref 0.5–1.4)
DIFFERENTIAL METHOD: ABNORMAL
EOSINOPHIL # BLD AUTO: 0.3 K/UL (ref 0–0.5)
EOSINOPHIL NFR BLD: 5.8 % (ref 0–8)
ERYTHROCYTE [DISTWIDTH] IN BLOOD BY AUTOMATED COUNT: 14.6 % (ref 11.5–14.5)
EST. GFR  (AFRICAN AMERICAN): >60 ML/MIN/1.73 M^2
EST. GFR  (NON AFRICAN AMERICAN): >60 ML/MIN/1.73 M^2
GLUCOSE SERPL-MCNC: 101 MG/DL (ref 70–110)
HCT VFR BLD AUTO: 39.6 % (ref 37–48.5)
HGB BLD-MCNC: 12.5 G/DL (ref 12–16)
IGA SERPL-MCNC: 342 MG/DL (ref 40–350)
IGG SERPL-MCNC: 1955 MG/DL (ref 650–1600)
IGM SERPL-MCNC: 177 MG/DL (ref 50–300)
IMM GRANULOCYTES # BLD AUTO: 0.01 K/UL (ref 0–0.04)
IMM GRANULOCYTES NFR BLD AUTO: 0.2 % (ref 0–0.5)
LYMPHOCYTES # BLD AUTO: 2.1 K/UL (ref 1–4.8)
LYMPHOCYTES NFR BLD: 41.5 % (ref 18–48)
MCH RBC QN AUTO: 28.5 PG (ref 27–31)
MCHC RBC AUTO-ENTMCNC: 31.6 G/DL (ref 32–36)
MCV RBC AUTO: 90 FL (ref 82–98)
MONOCYTES # BLD AUTO: 0.5 K/UL (ref 0.3–1)
MONOCYTES NFR BLD: 9.2 % (ref 4–15)
NEUTROPHILS # BLD AUTO: 2.2 K/UL (ref 1.8–7.7)
NEUTROPHILS NFR BLD: 43.1 % (ref 38–73)
NRBC BLD-RTO: 0 /100 WBC
PLATELET # BLD AUTO: 346 K/UL (ref 150–450)
PMV BLD AUTO: 11.1 FL (ref 9.2–12.9)
POTASSIUM SERPL-SCNC: 3.5 MMOL/L (ref 3.5–5.1)
PROT SERPL-MCNC: 8.5 G/DL (ref 6–8.4)
RBC # BLD AUTO: 4.38 M/UL (ref 4–5.4)
SODIUM SERPL-SCNC: 140 MMOL/L (ref 136–145)
WBC # BLD AUTO: 5.13 K/UL (ref 3.9–12.7)

## 2022-03-11 PROCEDURE — 80053 COMPREHEN METABOLIC PANEL: CPT | Performed by: INTERNAL MEDICINE

## 2022-03-11 PROCEDURE — 83520 IMMUNOASSAY QUANT NOS NONAB: CPT | Performed by: INTERNAL MEDICINE

## 2022-03-11 PROCEDURE — 86334 IMMUNOFIX E-PHORESIS SERUM: CPT | Performed by: INTERNAL MEDICINE

## 2022-03-11 PROCEDURE — 36415 COLL VENOUS BLD VENIPUNCTURE: CPT | Mod: PO | Performed by: INTERNAL MEDICINE

## 2022-03-11 PROCEDURE — 84165 PATHOLOGIST INTERPRETATION SPE: ICD-10-PCS | Mod: 26,,, | Performed by: PATHOLOGY

## 2022-03-11 PROCEDURE — 82784 ASSAY IGA/IGD/IGG/IGM EACH: CPT | Mod: 59 | Performed by: INTERNAL MEDICINE

## 2022-03-11 PROCEDURE — 84165 PROTEIN E-PHORESIS SERUM: CPT | Mod: 26,,, | Performed by: PATHOLOGY

## 2022-03-11 PROCEDURE — 85025 COMPLETE CBC W/AUTO DIFF WBC: CPT | Performed by: INTERNAL MEDICINE

## 2022-03-11 PROCEDURE — 86334 IMMUNOFIX E-PHORESIS SERUM: CPT | Mod: 26,,, | Performed by: PATHOLOGY

## 2022-03-11 PROCEDURE — 84165 PROTEIN E-PHORESIS SERUM: CPT | Performed by: INTERNAL MEDICINE

## 2022-03-11 PROCEDURE — 86334 PATHOLOGIST INTERPRETATION IFE: ICD-10-PCS | Mod: 26,,, | Performed by: PATHOLOGY

## 2022-03-14 DIAGNOSIS — Z12.31 OTHER SCREENING MAMMOGRAM: ICD-10-CM

## 2022-03-14 LAB
ALBUMIN SERPL ELPH-MCNC: 3.8 G/DL (ref 3.35–5.55)
ALPHA1 GLOB SERPL ELPH-MCNC: 0.35 G/DL (ref 0.17–0.41)
ALPHA2 GLOB SERPL ELPH-MCNC: 0.75 G/DL (ref 0.43–0.99)
B-GLOBULIN SERPL ELPH-MCNC: 1.05 G/DL (ref 0.5–1.1)
GAMMA GLOB SERPL ELPH-MCNC: 2.05 G/DL (ref 0.67–1.58)
INTERPRETATION SERPL IFE-IMP: NORMAL
KAPPA LC SER QL IA: 4.59 MG/DL (ref 0.33–1.94)
KAPPA LC/LAMBDA SER IA: 1.99 (ref 0.26–1.65)
LAMBDA LC SER QL IA: 2.31 MG/DL (ref 0.57–2.63)
PROT SERPL-MCNC: 8 G/DL (ref 6–8.4)

## 2022-03-15 LAB
PATHOLOGIST INTERPRETATION IFE: NORMAL
PATHOLOGIST INTERPRETATION SPE: NORMAL

## 2022-03-29 ENCOUNTER — TELEPHONE (OUTPATIENT)
Dept: HEMATOLOGY/ONCOLOGY | Facility: CLINIC | Age: 57
End: 2022-03-29
Payer: COMMERCIAL

## 2022-03-29 NOTE — TELEPHONE ENCOUNTER
Spoke with patient  And reviewed reason for visit. Patient understood and rescheduled appt as she is going out of town

## 2022-03-29 NOTE — TELEPHONE ENCOUNTER
"----- Message from Lei Abiola sent at 3/29/2022  9:45 AM CDT -----  Consult/Advisory:          Name Of Caller: Self      Contact Preference?: [phone]         Provider Name: Jenny      Does patient feel the need to be seen today? No      What is the nature of the call?: Calling to speak w/ nurse about 3/30 (tomorrow's appt). Stating she wasn't aware of tomorrow's appt and wanted to discuss the nature of it before potentially r/s.          Additional Notes:  "Thank you for all that you do for our patients"      "

## 2022-04-22 ENCOUNTER — OFFICE VISIT (OUTPATIENT)
Dept: HEMATOLOGY/ONCOLOGY | Facility: CLINIC | Age: 57
End: 2022-04-22
Payer: COMMERCIAL

## 2022-04-22 VITALS
RESPIRATION RATE: 20 BRPM | BODY MASS INDEX: 39.8 KG/M2 | DIASTOLIC BLOOD PRESSURE: 68 MMHG | TEMPERATURE: 99 F | HEIGHT: 59 IN | SYSTOLIC BLOOD PRESSURE: 139 MMHG | HEART RATE: 94 BPM | OXYGEN SATURATION: 95 % | WEIGHT: 197.44 LBS

## 2022-04-22 DIAGNOSIS — R76.8 ELEVATED SERUM IMMUNOGLOBULIN FREE LIGHT CHAINS: Primary | ICD-10-CM

## 2022-04-22 PROCEDURE — 99999 PR PBB SHADOW E&M-EST. PATIENT-LVL IV: ICD-10-PCS | Mod: PBBFAC,,, | Performed by: INTERNAL MEDICINE

## 2022-04-22 PROCEDURE — 3078F PR MOST RECENT DIASTOLIC BLOOD PRESSURE < 80 MM HG: ICD-10-PCS | Mod: CPTII,S$GLB,, | Performed by: INTERNAL MEDICINE

## 2022-04-22 PROCEDURE — 3078F DIAST BP <80 MM HG: CPT | Mod: CPTII,S$GLB,, | Performed by: INTERNAL MEDICINE

## 2022-04-22 PROCEDURE — 3075F PR MOST RECENT SYSTOLIC BLOOD PRESS GE 130-139MM HG: ICD-10-PCS | Mod: CPTII,S$GLB,, | Performed by: INTERNAL MEDICINE

## 2022-04-22 PROCEDURE — 1159F MED LIST DOCD IN RCRD: CPT | Mod: CPTII,S$GLB,, | Performed by: INTERNAL MEDICINE

## 2022-04-22 PROCEDURE — 4010F ACE/ARB THERAPY RXD/TAKEN: CPT | Mod: CPTII,S$GLB,, | Performed by: INTERNAL MEDICINE

## 2022-04-22 PROCEDURE — 3008F BODY MASS INDEX DOCD: CPT | Mod: CPTII,S$GLB,, | Performed by: INTERNAL MEDICINE

## 2022-04-22 PROCEDURE — 99214 OFFICE O/P EST MOD 30 MIN: CPT | Mod: S$GLB,,, | Performed by: INTERNAL MEDICINE

## 2022-04-22 PROCEDURE — 99999 PR PBB SHADOW E&M-EST. PATIENT-LVL IV: CPT | Mod: PBBFAC,,, | Performed by: INTERNAL MEDICINE

## 2022-04-22 PROCEDURE — 1159F PR MEDICATION LIST DOCUMENTED IN MEDICAL RECORD: ICD-10-PCS | Mod: CPTII,S$GLB,, | Performed by: INTERNAL MEDICINE

## 2022-04-22 PROCEDURE — 3075F SYST BP GE 130 - 139MM HG: CPT | Mod: CPTII,S$GLB,, | Performed by: INTERNAL MEDICINE

## 2022-04-22 PROCEDURE — 99214 PR OFFICE/OUTPT VISIT, EST, LEVL IV, 30-39 MIN: ICD-10-PCS | Mod: S$GLB,,, | Performed by: INTERNAL MEDICINE

## 2022-04-22 PROCEDURE — 4010F PR ACE/ARB THEARPY RXD/TAKEN: ICD-10-PCS | Mod: CPTII,S$GLB,, | Performed by: INTERNAL MEDICINE

## 2022-04-22 PROCEDURE — 3008F PR BODY MASS INDEX (BMI) DOCUMENTED: ICD-10-PCS | Mod: CPTII,S$GLB,, | Performed by: INTERNAL MEDICINE

## 2022-04-22 NOTE — PROGRESS NOTES
SECTION OF HEMATOLOGY AND BONE MARROW TRANSPLANT   return Patient Visit   04/26/2022  Referred by:  No ref. provider found  Referred for: elevated total protein     CHIEF COMPLAINT: No chief complaint on file.      HISTORY OF PRESENT ILLNESS:   Initial consult note - 9/22/21  56 y.o. female; PMH:  Hyperlipidemia associated with type 2 diabetes mellitus     Vitamin D deficiency disease     Hypertension associated with diabetes     Type 2 diabetes mellitus with microalbuminuria, without long-term current use of insulin     Morbid obesity with BMI of 40.0-44.9, adult       Referred by pcp for elevated total protein noted on CMP; upon epic review she has had mild elevation of total protein dating back to at least 2008.  Never exceeding 9 grams.  2021 spep and SIEF with polyclonal banding and no monoclonal proteins. Recent hiv, hep c ab negative. She denies any symptoms of hyperviscosity, infection, autoimmune disease.   Denies any symptoms to suggest underlying plasma cell disorder or Lymphoproliferative disorder.  Works at [employer].  Fully functional.  Denies fever, chills, nightsweats, bleeding, brusing, lymphadenopathy, signs/symptoms of splenomegaly.        Interval visit:   Presents to review repeat cbc, cmp, serum free light chains, quantitative immunoglobulins, serum electropheresis, serum immunofixation labs repeated out of abundance of caution to ensure stablity.   She has no signs/symptoms to suggest evolving/underlying plasma cell disorder or Lymphoproliferative disorder.     PAST MEDICAL HISTORY:   Past Medical History:   Diagnosis Date    HTN (hypertension)     Obesity, Class II, BMI 35-39.9, with comorbidity 12/19/2016    Vitamin D deficiency disease 3/24/2015       PAST SURGICAL HISTORY:   Past Surgical History:   Procedure Laterality Date    CARDIAC SURGERY      Age 3- murmur    COLONOSCOPY N/A 10/19/2018    Procedure: COLONOSCOPY;  Surgeon: Cali De Jesus MD;  Location: [facility] ([address] [place] [address]);   Service: Endoscopy;  Laterality: N/A;    HYSTERECTOMY      TLH WITHOUT BSO       PAST SOCIAL HISTORY:   reports that she has never smoked. She has never used smokeless tobacco. She reports current alcohol use. She reports that she does not use drugs.    FAMILY HISTORY:  Family History   Problem Relation Age of Onset    Diabetes Father     COPD Father     Hypertension Father     Hypertension Mother     Hypertension Brother     No Known Problems Daughter     No Known Problems Daughter     Hypertension Brother     No Known Problems Brother     Glaucoma Neg Hx     Breast cancer Neg Hx     Colon cancer Neg Hx     Ovarian cancer Neg Hx        CURRENT MEDICATIONS:   Current Outpatient Medications   Medication Sig    amLODIPine (NORVASC) 10 MG tablet Take 1 tablet (10 mg total) by mouth once daily.    aspirin (ECOTRIN) 81 MG EC tablet Take 1 tablet (81 mg total) by mouth once daily.    atorvastatin (LIPITOR) 20 MG tablet Take 1 tablet (20 mg total) by mouth once daily.    blood sugar diagnostic (ONE TOUCH ULTRA TEST) Strp 1 each by Misc.(Non-Drug; Combo Route) route daily as needed.    lancets Misc 1 each by Misc.(Non-Drug; Combo Route) route once daily.    losartan (COZAAR) 100 MG tablet Take 1 tablet (100 mg total) by mouth once daily.    meloxicam (MOBIC) 15 MG tablet Take 1 tablet (15 mg total) by mouth daily as needed for Pain.    metFORMIN (GLUCOPHAGE-XR) 500 MG ER 24hr tablet Take 1 tablet (500 mg total) by mouth daily with breakfast.    methocarbamoL (ROBAXIN) 500 MG Tab Take 1 tablet (500 mg total) by mouth nightly as needed (muscle spasms). As needed for muscle spasm. May cause drowsiness, use especially at bedtime    diclofenac sodium (VOLTAREN) 1 % Gel Apply 2 g topically 4 (four) times daily as needed. (Patient not taking: Reported on 4/22/2022)    ergocalciferol (ERGOCALCIFEROL) 50,000 unit Cap Take 50,000 Units by mouth every 7 days.     No current facility-administered medications  for this visit.     ALLERGIES:   Review of patient's allergies indicates:   Allergen Reactions    Ace inhibitors      Other reaction(s): cough             REVIEW OF SYSTEMS:   General ROS: negative  Psychological ROS: negative  Ophthalmic ROS: negative  ENT ROS: negative  Allergy and Immunology ROS: negative  Hematological and Lymphatic ROS: negative  Endocrine ROS: negative  Respiratory ROS: negative  Cardiovascular ROS: negative  Gastrointestinal ROS: negative  Genito-Urinary ROS: negative  Musculoskeletal ROS: negative  Neurological ROS: negative  Dermatological ROS: negative    PHYSICAL EXAM:   Vitals:    04/22/22 1314   BP: 139/68   Pulse: 94   Resp: 20   Temp: 98.7 °F (37.1 °C)       General - well developed, well nourished, no apparent distress  Head & Face - no sinus tenderness  Eyes - normal conjunctivae and lids   ENT - normal external auditory canals and tympanic membranes bilaterally oropharynx clear,  Normal dentition and gums  Neck - normal thyroid  Chest and Lung - normal respiratory effort, clear to auscultation bilaterally   Cardiovascular - RRR with no MGR, normal S1 and S2; no pedal edema  Abdomen -  soft, nontender, no palpable hepatomegaly or splenomegaly  Lymph - no palpable lymphadenopathy  Extremities - unremarkable nails and digits  Heme - no bruising, petechiae, pallor  Skin - no rashes or lesions  Psych - appropriate mood and affect      ECOG Performance Status: (foot note - ECOG PS provided by Eastern Cooperative Oncology Group) 0 - Asymptomatic    Karnofsky Performance Score:  100%- Normal, No Complaints, No Evidence of Disease  DATA:   Lab Results   Component Value Date    WBC 5.13 03/11/2022    HGB 12.5 03/11/2022    HCT 39.6 03/11/2022    MCV 90 03/11/2022     03/11/2022       CMP  Sodium   Date Value Ref Range Status   03/11/2022 140 136 - 145 mmol/L Final     Potassium   Date Value Ref Range Status   03/11/2022 3.5 3.5 - 5.1 mmol/L Final     Chloride   Date Value Ref Range  Status   03/11/2022 102 95 - 110 mmol/L Final     CO2   Date Value Ref Range Status   03/11/2022 29 23 - 29 mmol/L Final     Glucose   Date Value Ref Range Status   03/11/2022 101 70 - 110 mg/dL Final     BUN   Date Value Ref Range Status   03/11/2022 13 6 - 20 mg/dL Final     Creatinine   Date Value Ref Range Status   03/11/2022 0.7 0.5 - 1.4 mg/dL Final     Calcium   Date Value Ref Range Status   03/11/2022 9.4 8.7 - 10.5 mg/dL Final     Total Protein   Date Value Ref Range Status   03/11/2022 8.5 (H) 6.0 - 8.4 g/dL Final     Albumin   Date Value Ref Range Status   03/11/2022 3.8 3.5 - 5.2 g/dL Final     Total Bilirubin   Date Value Ref Range Status   03/11/2022 0.4 0.1 - 1.0 mg/dL Final     Comment:     For infants and newborns, interpretation of results should be based  on gestational age, weight and in agreement with clinical  observations.    Premature Infant recommended reference ranges:  Up to 24 hours.............<8.0 mg/dL  Up to 48 hours............<12.0 mg/dL  3-5 days..................<15.0 mg/dL  6-29 days.................<15.0 mg/dL       Alkaline Phosphatase   Date Value Ref Range Status   03/11/2022 77 55 - 135 U/L Final     AST   Date Value Ref Range Status   03/11/2022 21 10 - 40 U/L Final     ALT   Date Value Ref Range Status   03/11/2022 22 10 - 44 U/L Final     Anion Gap   Date Value Ref Range Status   03/11/2022 9 8 - 16 mmol/L Final     eGFR if    Date Value Ref Range Status   03/11/2022 >60.0 >60 mL/min/1.73 m^2 Final     eGFR if non    Date Value Ref Range Status   03/11/2022 >60.0 >60 mL/min/1.73 m^2 Final     Comment:     Calculation used to obtain the estimated glomerular filtration  rate (eGFR) is the CKD-EPI equation.        Collected: 08/13/21 0749   Result status: Final   Resulting lab: [facility]   Value: REVIEWED   Comment:   Electronically reviewed and signed by:   Micaela Haque M.D.   Signed on 08/17/21 at 16:16    Total protein is at the upper limit of normal.   Increased gamma globulin, polyclonal.   No paraproteins are detected, but the beta-1/beta-2 ratio is   abnormal.  Given that the corresponding AMANDA is normal, follow up with   free light chain studies and repeat SPE/AMANDA in 3-6 months is   recommended.    Pathologist Interpretation AMANDA  Pathologist Interpretation AMANDA  Collected: 08/13/21 0749   Result status: Final   Resulting lab: [facility]   Value: REVIEWED   Comment:   Electronically reviewed and signed by:   Micaela Haque M.D.   Signed on 08/17/21 at 16:19   No monoclonal peaks identified.    *Additional information available - comment         ASSESSMENT AND PLAN:   Encounter Diagnosis   Name Primary?    Elevated serum immunoglobulin free light chains Yes     -chronic mild elevation of total protein dating back to at least 2008 in setting of polyclonal SPEP and normal  SIEF/CBC/CMP  -given lack of symptoms, normal SPEP/SIEF results and chronicity of TP finding have extremely low index of suspicion for primary hematologic disorder  -suspect her stable mild elevation of KLc is function of GFR rather than occult bone marrow process  -she has no signs/symptoms to suggest occult malignancy, infection, or autoimmune disease as etiology of her lab findings  -suspect this is her physiologic TP baseline  -repeat serum studies from march 2022 remain stable  -given such  low clinical index of suspicion for primary bone marrow process, we will sign off; recommend pcp check cbc, cmp annually and refer back if any concerning changes    Follow Up:  As above

## 2022-05-04 ENCOUNTER — OFFICE VISIT (OUTPATIENT)
Dept: URGENT CARE | Facility: CLINIC | Age: 57
End: 2022-05-04
Payer: COMMERCIAL

## 2022-05-04 VITALS
TEMPERATURE: 101 F | DIASTOLIC BLOOD PRESSURE: 78 MMHG | HEART RATE: 120 BPM | BODY MASS INDEX: 39.72 KG/M2 | WEIGHT: 197 LBS | HEIGHT: 59 IN | OXYGEN SATURATION: 96 % | SYSTOLIC BLOOD PRESSURE: 123 MMHG | RESPIRATION RATE: 18 BRPM

## 2022-05-04 DIAGNOSIS — J10.1 INFLUENZA A: Primary | ICD-10-CM

## 2022-05-04 LAB
CTP QC/QA: YES
CTP QC/QA: YES
POC MOLECULAR INFLUENZA A AGN: POSITIVE
POC MOLECULAR INFLUENZA B AGN: NEGATIVE
SARS-COV-2 RDRP RESP QL NAA+PROBE: NEGATIVE

## 2022-05-04 PROCEDURE — 99213 OFFICE O/P EST LOW 20 MIN: CPT | Mod: S$GLB,CS,, | Performed by: FAMILY MEDICINE

## 2022-05-04 PROCEDURE — 3008F BODY MASS INDEX DOCD: CPT | Mod: CPTII,S$GLB,, | Performed by: FAMILY MEDICINE

## 2022-05-04 PROCEDURE — 3078F PR MOST RECENT DIASTOLIC BLOOD PRESSURE < 80 MM HG: ICD-10-PCS | Mod: CPTII,S$GLB,, | Performed by: FAMILY MEDICINE

## 2022-05-04 PROCEDURE — 3008F PR BODY MASS INDEX (BMI) DOCUMENTED: ICD-10-PCS | Mod: CPTII,S$GLB,, | Performed by: FAMILY MEDICINE

## 2022-05-04 PROCEDURE — 1160F RVW MEDS BY RX/DR IN RCRD: CPT | Mod: CPTII,S$GLB,, | Performed by: FAMILY MEDICINE

## 2022-05-04 PROCEDURE — 4010F ACE/ARB THERAPY RXD/TAKEN: CPT | Mod: CPTII,S$GLB,, | Performed by: FAMILY MEDICINE

## 2022-05-04 PROCEDURE — 3074F SYST BP LT 130 MM HG: CPT | Mod: CPTII,S$GLB,, | Performed by: FAMILY MEDICINE

## 2022-05-04 PROCEDURE — 4010F PR ACE/ARB THEARPY RXD/TAKEN: ICD-10-PCS | Mod: CPTII,S$GLB,, | Performed by: FAMILY MEDICINE

## 2022-05-04 PROCEDURE — 3074F PR MOST RECENT SYSTOLIC BLOOD PRESSURE < 130 MM HG: ICD-10-PCS | Mod: CPTII,S$GLB,, | Performed by: FAMILY MEDICINE

## 2022-05-04 PROCEDURE — 1159F MED LIST DOCD IN RCRD: CPT | Mod: CPTII,S$GLB,, | Performed by: FAMILY MEDICINE

## 2022-05-04 PROCEDURE — 1160F PR REVIEW ALL MEDS BY PRESCRIBER/CLIN PHARMACIST DOCUMENTED: ICD-10-PCS | Mod: CPTII,S$GLB,, | Performed by: FAMILY MEDICINE

## 2022-05-04 PROCEDURE — 1159F PR MEDICATION LIST DOCUMENTED IN MEDICAL RECORD: ICD-10-PCS | Mod: CPTII,S$GLB,, | Performed by: FAMILY MEDICINE

## 2022-05-04 PROCEDURE — 99213 PR OFFICE/OUTPT VISIT, EST, LEVL III, 20-29 MIN: ICD-10-PCS | Mod: S$GLB,CS,, | Performed by: FAMILY MEDICINE

## 2022-05-04 PROCEDURE — 87502 INFLUENZA DNA AMP PROBE: CPT | Mod: QW,S$GLB,, | Performed by: FAMILY MEDICINE

## 2022-05-04 PROCEDURE — U0002: ICD-10-PCS | Mod: QW,S$GLB,, | Performed by: FAMILY MEDICINE

## 2022-05-04 PROCEDURE — 87502 POCT INFLUENZA A/B MOLECULAR: ICD-10-PCS | Mod: QW,S$GLB,, | Performed by: FAMILY MEDICINE

## 2022-05-04 PROCEDURE — 3078F DIAST BP <80 MM HG: CPT | Mod: CPTII,S$GLB,, | Performed by: FAMILY MEDICINE

## 2022-05-04 PROCEDURE — U0002 COVID-19 LAB TEST NON-CDC: HCPCS | Mod: QW,S$GLB,, | Performed by: FAMILY MEDICINE

## 2022-05-04 RX ORDER — ACETAMINOPHEN 500 MG
500 TABLET ORAL
Status: COMPLETED | OUTPATIENT
Start: 2022-05-04 | End: 2022-05-04

## 2022-05-04 RX ORDER — PROMETHAZINE HYDROCHLORIDE AND DEXTROMETHORPHAN HYDROBROMIDE 6.25; 15 MG/5ML; MG/5ML
5 SYRUP ORAL EVERY 4 HOURS PRN
Qty: 118 ML | Refills: 0 | Status: SHIPPED | OUTPATIENT
Start: 2022-05-04 | End: 2022-07-05

## 2022-05-04 RX ADMIN — Medication 500 MG: at 07:05

## 2022-05-04 NOTE — PROGRESS NOTES
"Subjective:       Patient ID: Amanda Ponce is a 56 y.o. female.    Vitals:  height is 4' 11" (1.499 m) and weight is 89.4 kg (197 lb). Her temperature is 100.8 °F (38.2 °C) (abnormal). Her blood pressure is 123/78 and her pulse is 120 (abnormal). Her respiration is 18 and oxygen saturation is 96%.     Chief Complaint: Cough    Pt is coming in today with cough. Pt says her symptoms started Sunday 3 days ago. Pt says she was possibly exposed to Covid at work. Pt says she also have sore throat, congestion, abdominal and back pain.100.8 fever. Pt took multiple OTC medications to help, but with no relief.       Cough  This is a new problem. The current episode started in the past 7 days. The problem has been gradually worsening. The problem occurs constantly. The cough is non-productive. Associated symptoms include a fever, headaches and a sore throat. Pertinent negatives include no chest pain, ear congestion, ear pain, postnasal drip, shortness of breath or wheezing. Nothing aggravates the symptoms. She has tried OTC cough suppressant for the symptoms. The treatment provided no relief. There is no history of asthma, bronchitis, COPD or pneumonia.       Constitution: Positive for fever.   HENT: Positive for congestion and sore throat. Negative for ear pain and postnasal drip.    Cardiovascular: Negative for chest pain.   Eyes: Negative for eye pain.   Respiratory: Positive for cough. Negative for chest tightness, shortness of breath, wheezing and asthma.    Gastrointestinal: Positive for abdominal pain (epigastric pain with coughing). Negative for nausea, vomiting and diarrhea.   Musculoskeletal: Positive for back pain.   Allergic/Immunologic: Negative for asthma.   Neurological: Positive for headaches.       Objective:      Physical Exam   Constitutional: She is oriented to person, place, and time. She appears well-developed. She is cooperative.  Non-toxic appearance. She does not appear ill. No distress.   HENT: "   Head: Normocephalic and atraumatic.   Ears:   Right Ear: Hearing, tympanic membrane, external ear and ear canal normal. Tympanic membrane is not erythematous. No middle ear effusion.   Left Ear: Hearing, tympanic membrane, external ear and ear canal normal. Tympanic membrane is not erythematous.  No middle ear effusion.   Nose: Nose normal. No mucosal edema, rhinorrhea or nasal deformity. No epistaxis. Right sinus exhibits no maxillary sinus tenderness and no frontal sinus tenderness. Left sinus exhibits no maxillary sinus tenderness and no frontal sinus tenderness.   Mouth/Throat: Uvula is midline, oropharynx is clear and moist and mucous membranes are normal. No trismus in the jaw. Normal dentition. No uvula swelling. No oropharyngeal exudate, posterior oropharyngeal edema, posterior oropharyngeal erythema, tonsillar abscesses or cobblestoning. Tonsils are 2+ on the right. Tonsils are 2+ on the left.   Eyes: Conjunctivae and lids are normal. No scleral icterus.   Neck: Trachea normal and phonation normal. Neck supple. No edema present. No erythema present. No neck rigidity present.   Cardiovascular: Normal rate, regular rhythm, normal heart sounds and normal pulses.   Pulmonary/Chest: Effort normal and breath sounds normal. No accessory muscle usage. No tachypnea. No respiratory distress. She has no decreased breath sounds. She has no wheezes. She has no rhonchi. She has no rales.   NAD able to speak in clear complete sentences without difficulty      Comments: NAD able to speak in clear complete sentences without difficulty      Abdominal: Normal appearance.   Musculoskeletal: Normal range of motion.         General: No deformity. Normal range of motion.   Neurological: She is alert and oriented to person, place, and time. She exhibits normal muscle tone. Coordination normal.   Skin: Skin is warm, dry, intact, not diaphoretic and not pale.   Psychiatric: Her speech is normal and behavior is normal. Judgment and  thought content normal.   Nursing note and vitals reviewed.    Results for orders placed or performed in visit on 05/04/22   POCT COVID-19 Rapid Screening   Result Value Ref Range    POC Rapid COVID Negative Negative     Acceptable Yes    POCT Influenza A/B MOLECULAR   Result Value Ref Range    POC Molecular Influenza A Ag Positive (A) Negative, Not Reported    POC Molecular Influenza B Ag Negative Negative, Not Reported     Acceptable Yes            Assessment:       1. Influenza A          Plan:         Influenza A  -     POCT COVID-19 Rapid Screening  -     POCT Influenza A/B MOLECULAR  -     promethazine-dextromethorphan (PROMETHAZINE-DM) 6.25-15 mg/5 mL Syrp; Take 5 mLs by mouth every 4 (four) hours as needed (cough).  Dispense: 118 mL; Refill: 0  -     acetaminophen tablet 500 mg      Patient Instructions   PLEASE READ YOUR DISCHARGE INSTRUCTIONS ENTIRELY AS IT CONTAINS IMPORTANT INFORMATION.      You have been diagnosed with Influenza.     You are contagious for 24 hours after  your last fever,   Please drink plenty of fluids.    Please get plenty of rest.    Please return here or go to the Emergency Department for any concerns or worsening of condition.      Tylenol and ibuprofen as needed    Coricidin    Do not drive while taking the cough syrup - best to take it at night before going to sleep. However, you can take it during the day (every 4-6 hours) if you do not have to drive or operate machinery. This medication will make you drowsy. Try taking half a dose first to see how it affects you.   DO NOT TAKE WITH ANY OTHER OVER THE COUNTER MEDICATIONS       Use over the counter flonase: one spray each nostril twice daily OR two sprays each nostril once daily.   If you find this dries your nose out or your nose bleeds, try using over the counter nasal saline a few minutes prior to using the flonase to moisten the lining of your nose.     Please return or see your primary care  doctor if you develop new or worsening symptoms.     Please arrange follow up with your primary medical clinic as soon as possible. You must understand that you've received an Urgent Care treatment only and that you may be released before all of your medical problems are known or treated. You, the patient, will arrange for follow up as instructed. If your symptoms worsen or fail to improve you should go to the Emergency Room.  WE CANNOT RULE OUT ALL POSSIBLE CAUSES OF YOUR SYMPTOMS IN THE URGENT CARE SETTING PLEASE GO TO THE ER IF YOU FEELS YOUR CONDITION IS WORSENING OR YOU WOULD LIKE EMERGENT EVALUATION.

## 2022-05-04 NOTE — LETTER
[address] [facility], [address] ? [place] [address] ? Phone [phone] ? Fax [phone]           Return to Work/School    Patient: Amanda Ponce  YOB: 1965   Date: 05/04/2022      To Whom It May Concern:     Amanda Ponce was in contact with/seen in my office on 05/04/2022. COVID-19 is present in our communities across the state. Not all patients are eligible or appropriate to be tested. In this situation, your employee meets the following criteria:     Amanda Ponce has met the criteria for COVID-19 testing and has a NEGATIVE result. The employee can return to work once they are fever free for 24 hours without the use of fever reducing medications (Tylenol, Motrin, etc).     If you have any questions or concerns, or if I can be of further assistance, please do not hesitate to contact me.     Sincerely,      Edy Edwards NP

## 2022-05-05 NOTE — PATIENT INSTRUCTIONS
PLEASE READ YOUR DISCHARGE INSTRUCTIONS ENTIRELY AS IT CONTAINS IMPORTANT INFORMATION.      You have been diagnosed with Influenza.     You are contagious for 24 hours after  your last fever,   Please drink plenty of fluids.    Please get plenty of rest.    Please return here or go to the Emergency Department for any concerns or worsening of condition.      Tylenol and ibuprofen as needed    Coricidin    Do not drive while taking the cough syrup - best to take it at night before going to sleep. However, you can take it during the day (every 4-6 hours) if you do not have to drive or operate machinery. This medication will make you drowsy. Try taking half a dose first to see how it affects you.   DO NOT TAKE WITH ANY OTHER OVER THE COUNTER MEDICATIONS       Use over the counter flonase: one spray each nostril twice daily OR two sprays each nostril once daily.   If you find this dries your nose out or your nose bleeds, try using over the counter nasal saline a few minutes prior to using the flonase to moisten the lining of your nose.     Please return or see your primary care doctor if you develop new or worsening symptoms.     Please arrange follow up with your primary medical clinic as soon as possible. You must understand that you've received an Urgent Care treatment only and that you may be released before all of your medical problems are known or treated. You, the patient, will arrange for follow up as instructed. If your symptoms worsen or fail to improve you should go to the Emergency Room.  WE CANNOT RULE OUT ALL POSSIBLE CAUSES OF YOUR SYMPTOMS IN THE URGENT CARE SETTING PLEASE GO TO THE ER IF YOU FEELS YOUR CONDITION IS WORSENING OR YOU WOULD LIKE EMERGENT EVALUATION.

## 2022-06-29 ENCOUNTER — HOSPITAL ENCOUNTER (OUTPATIENT)
Dept: RADIOLOGY | Facility: HOSPITAL | Age: 57
Discharge: HOME | End: 2022-06-29
Attending: INTERNAL MEDICINE
Payer: COMMERCIAL

## 2022-06-29 DIAGNOSIS — Z12.31 OTHER SCREENING MAMMOGRAM: ICD-10-CM

## 2022-06-29 PROCEDURE — 77063 BREAST TOMOSYNTHESIS BI: CPT | Mod: 26,,, | Performed by: RADIOLOGY

## 2022-06-29 PROCEDURE — 77067 SCR MAMMO BI INCL CAD: CPT | Mod: 26,,, | Performed by: RADIOLOGY

## 2022-06-29 PROCEDURE — 77067 SCR MAMMO BI INCL CAD: CPT | Mod: TC,PO

## 2022-06-29 PROCEDURE — 77063 BREAST TOMOSYNTHESIS BI: CPT | Mod: TC,PO

## 2022-06-29 PROCEDURE — 77063 MAMMO DIGITAL SCREENING BILAT WITH TOMO: ICD-10-PCS | Mod: 26,,, | Performed by: RADIOLOGY

## 2022-06-29 PROCEDURE — 77067 MAMMO DIGITAL SCREENING BILAT WITH TOMO: ICD-10-PCS | Mod: 26,,, | Performed by: RADIOLOGY

## 2022-07-05 ENCOUNTER — LAB VISIT (OUTPATIENT)
Dept: LAB | Facility: HOSPITAL | Age: 57
End: 2022-07-05
Payer: COMMERCIAL

## 2022-07-05 ENCOUNTER — OFFICE VISIT (OUTPATIENT)
Dept: INTERNAL MEDICINE | Facility: CLINIC | Age: 57
End: 2022-07-05
Payer: COMMERCIAL

## 2022-07-05 VITALS
BODY MASS INDEX: 40.32 KG/M2 | HEIGHT: 59 IN | DIASTOLIC BLOOD PRESSURE: 80 MMHG | SYSTOLIC BLOOD PRESSURE: 130 MMHG | WEIGHT: 200 LBS

## 2022-07-05 DIAGNOSIS — Z00.00 ANNUAL PHYSICAL EXAM: Primary | ICD-10-CM

## 2022-07-05 DIAGNOSIS — E11.59 HYPERTENSION ASSOCIATED WITH DIABETES: ICD-10-CM

## 2022-07-05 DIAGNOSIS — E66.01 MORBID OBESITY WITH BMI OF 40.0-44.9, ADULT: ICD-10-CM

## 2022-07-05 DIAGNOSIS — H61.20 CERUMEN DEBRIS ON TYMPANIC MEMBRANE, UNSPECIFIED LATERALITY: ICD-10-CM

## 2022-07-05 DIAGNOSIS — E11.69 HYPERLIPIDEMIA ASSOCIATED WITH TYPE 2 DIABETES MELLITUS: ICD-10-CM

## 2022-07-05 DIAGNOSIS — I15.2 HYPERTENSION ASSOCIATED WITH DIABETES: ICD-10-CM

## 2022-07-05 DIAGNOSIS — E78.5 HYPERLIPIDEMIA ASSOCIATED WITH TYPE 2 DIABETES MELLITUS: ICD-10-CM

## 2022-07-05 DIAGNOSIS — R80.9 TYPE 2 DIABETES MELLITUS WITH MICROALBUMINURIA, WITHOUT LONG-TERM CURRENT USE OF INSULIN: ICD-10-CM

## 2022-07-05 DIAGNOSIS — E11.29 TYPE 2 DIABETES MELLITUS WITH MICROALBUMINURIA, WITHOUT LONG-TERM CURRENT USE OF INSULIN: ICD-10-CM

## 2022-07-05 DIAGNOSIS — Z00.00 ANNUAL PHYSICAL EXAM: ICD-10-CM

## 2022-07-05 DIAGNOSIS — E55.9 VITAMIN D DEFICIENCY DISEASE: ICD-10-CM

## 2022-07-05 LAB
25(OH)D3+25(OH)D2 SERPL-MCNC: 20 NG/ML (ref 30–96)
ALBUMIN SERPL BCP-MCNC: 3.8 G/DL (ref 3.5–5.2)
ALBUMIN/CREAT UR: 7.2 UG/MG (ref 0–30)
ALP SERPL-CCNC: 90 U/L (ref 55–135)
ALT SERPL W/O P-5'-P-CCNC: 21 U/L (ref 10–44)
ANION GAP SERPL CALC-SCNC: 10 MMOL/L (ref 8–16)
AST SERPL-CCNC: 19 U/L (ref 10–40)
BASOPHILS # BLD AUTO: 0.02 K/UL (ref 0–0.2)
BASOPHILS NFR BLD: 0.3 % (ref 0–1.9)
BILIRUB SERPL-MCNC: 0.4 MG/DL (ref 0.1–1)
BUN SERPL-MCNC: 13 MG/DL (ref 6–20)
CALCIUM SERPL-MCNC: 9.6 MG/DL (ref 8.7–10.5)
CHLORIDE SERPL-SCNC: 103 MMOL/L (ref 95–110)
CHOLEST SERPL-MCNC: 173 MG/DL (ref 120–199)
CHOLEST/HDLC SERPL: 3.3 {RATIO} (ref 2–5)
CO2 SERPL-SCNC: 27 MMOL/L (ref 23–29)
CREAT SERPL-MCNC: 0.8 MG/DL (ref 0.5–1.4)
CREAT UR-MCNC: 181 MG/DL (ref 15–325)
DIFFERENTIAL METHOD: ABNORMAL
EOSINOPHIL # BLD AUTO: 0.3 K/UL (ref 0–0.5)
EOSINOPHIL NFR BLD: 5.2 % (ref 0–8)
ERYTHROCYTE [DISTWIDTH] IN BLOOD BY AUTOMATED COUNT: 15.7 % (ref 11.5–14.5)
EST. GFR  (AFRICAN AMERICAN): >60 ML/MIN/1.73 M^2
EST. GFR  (NON AFRICAN AMERICAN): >60 ML/MIN/1.73 M^2
ESTIMATED AVG GLUCOSE: 140 MG/DL (ref 68–131)
GLUCOSE SERPL-MCNC: 113 MG/DL (ref 70–110)
HBA1C MFR BLD: 6.5 % (ref 4–5.6)
HCT VFR BLD AUTO: 40.8 % (ref 37–48.5)
HDLC SERPL-MCNC: 53 MG/DL (ref 40–75)
HDLC SERPL: 30.6 % (ref 20–50)
HGB BLD-MCNC: 12.8 G/DL (ref 12–16)
IMM GRANULOCYTES # BLD AUTO: 0.01 K/UL (ref 0–0.04)
IMM GRANULOCYTES NFR BLD AUTO: 0.2 % (ref 0–0.5)
LDLC SERPL CALC-MCNC: 98.2 MG/DL (ref 63–159)
LYMPHOCYTES # BLD AUTO: 1.8 K/UL (ref 1–4.8)
LYMPHOCYTES NFR BLD: 30.9 % (ref 18–48)
MCH RBC QN AUTO: 28.7 PG (ref 27–31)
MCHC RBC AUTO-ENTMCNC: 31.4 G/DL (ref 32–36)
MCV RBC AUTO: 92 FL (ref 82–98)
MICROALBUMIN UR DL<=1MG/L-MCNC: 13 UG/ML
MONOCYTES # BLD AUTO: 0.6 K/UL (ref 0.3–1)
MONOCYTES NFR BLD: 9.2 % (ref 4–15)
NEUTROPHILS # BLD AUTO: 3.2 K/UL (ref 1.8–7.7)
NEUTROPHILS NFR BLD: 54.2 % (ref 38–73)
NONHDLC SERPL-MCNC: 120 MG/DL
NRBC BLD-RTO: 0 /100 WBC
PLATELET # BLD AUTO: 331 K/UL (ref 150–450)
PMV BLD AUTO: 10.8 FL (ref 9.2–12.9)
POTASSIUM SERPL-SCNC: 3.6 MMOL/L (ref 3.5–5.1)
PROT SERPL-MCNC: 8.6 G/DL (ref 6–8.4)
RBC # BLD AUTO: 4.46 M/UL (ref 4–5.4)
SODIUM SERPL-SCNC: 140 MMOL/L (ref 136–145)
TRIGL SERPL-MCNC: 109 MG/DL (ref 30–150)
TSH SERPL DL<=0.005 MIU/L-ACNC: 2.06 UIU/ML (ref 0.4–4)
WBC # BLD AUTO: 5.95 K/UL (ref 3.9–12.7)

## 2022-07-05 PROCEDURE — 3061F NEG MICROALBUMINURIA REV: CPT | Mod: CPTII,S$GLB,, | Performed by: INTERNAL MEDICINE

## 2022-07-05 PROCEDURE — 99999 PR PBB SHADOW E&M-EST. PATIENT-LVL IV: ICD-10-PCS | Mod: PBBFAC,,, | Performed by: INTERNAL MEDICINE

## 2022-07-05 PROCEDURE — 3066F PR DOCUMENTATION OF TREATMENT FOR NEPHROPATHY: ICD-10-PCS | Mod: CPTII,S$GLB,, | Performed by: INTERNAL MEDICINE

## 2022-07-05 PROCEDURE — 99396 PREV VISIT EST AGE 40-64: CPT | Mod: S$GLB,,, | Performed by: INTERNAL MEDICINE

## 2022-07-05 PROCEDURE — 83036 HEMOGLOBIN GLYCOSYLATED A1C: CPT | Performed by: INTERNAL MEDICINE

## 2022-07-05 PROCEDURE — 3044F HG A1C LEVEL LT 7.0%: CPT | Mod: CPTII,S$GLB,, | Performed by: INTERNAL MEDICINE

## 2022-07-05 PROCEDURE — 3075F SYST BP GE 130 - 139MM HG: CPT | Mod: CPTII,S$GLB,, | Performed by: INTERNAL MEDICINE

## 2022-07-05 PROCEDURE — 1160F RVW MEDS BY RX/DR IN RCRD: CPT | Mod: CPTII,S$GLB,, | Performed by: INTERNAL MEDICINE

## 2022-07-05 PROCEDURE — 1159F MED LIST DOCD IN RCRD: CPT | Mod: CPTII,S$GLB,, | Performed by: INTERNAL MEDICINE

## 2022-07-05 PROCEDURE — 3079F PR MOST RECENT DIASTOLIC BLOOD PRESSURE 80-89 MM HG: ICD-10-PCS | Mod: CPTII,S$GLB,, | Performed by: INTERNAL MEDICINE

## 2022-07-05 PROCEDURE — 3061F PR NEG MICROALBUMINURIA RESULT DOCUMENTED/REVIEW: ICD-10-PCS | Mod: CPTII,S$GLB,, | Performed by: INTERNAL MEDICINE

## 2022-07-05 PROCEDURE — 3066F NEPHROPATHY DOC TX: CPT | Mod: CPTII,S$GLB,, | Performed by: INTERNAL MEDICINE

## 2022-07-05 PROCEDURE — 85025 COMPLETE CBC W/AUTO DIFF WBC: CPT | Performed by: INTERNAL MEDICINE

## 2022-07-05 PROCEDURE — 82043 UR ALBUMIN QUANTITATIVE: CPT | Performed by: INTERNAL MEDICINE

## 2022-07-05 PROCEDURE — 80061 LIPID PANEL: CPT | Performed by: INTERNAL MEDICINE

## 2022-07-05 PROCEDURE — 3079F DIAST BP 80-89 MM HG: CPT | Mod: CPTII,S$GLB,, | Performed by: INTERNAL MEDICINE

## 2022-07-05 PROCEDURE — 3008F BODY MASS INDEX DOCD: CPT | Mod: CPTII,S$GLB,, | Performed by: INTERNAL MEDICINE

## 2022-07-05 PROCEDURE — 36415 COLL VENOUS BLD VENIPUNCTURE: CPT | Performed by: INTERNAL MEDICINE

## 2022-07-05 PROCEDURE — 99999 PR PBB SHADOW E&M-EST. PATIENT-LVL IV: CPT | Mod: PBBFAC,,, | Performed by: INTERNAL MEDICINE

## 2022-07-05 PROCEDURE — 1159F PR MEDICATION LIST DOCUMENTED IN MEDICAL RECORD: ICD-10-PCS | Mod: CPTII,S$GLB,, | Performed by: INTERNAL MEDICINE

## 2022-07-05 PROCEDURE — 82306 VITAMIN D 25 HYDROXY: CPT | Performed by: INTERNAL MEDICINE

## 2022-07-05 PROCEDURE — 3044F PR MOST RECENT HEMOGLOBIN A1C LEVEL <7.0%: ICD-10-PCS | Mod: CPTII,S$GLB,, | Performed by: INTERNAL MEDICINE

## 2022-07-05 PROCEDURE — 1160F PR REVIEW ALL MEDS BY PRESCRIBER/CLIN PHARMACIST DOCUMENTED: ICD-10-PCS | Mod: CPTII,S$GLB,, | Performed by: INTERNAL MEDICINE

## 2022-07-05 PROCEDURE — 4010F ACE/ARB THERAPY RXD/TAKEN: CPT | Mod: CPTII,S$GLB,, | Performed by: INTERNAL MEDICINE

## 2022-07-05 PROCEDURE — 82570 ASSAY OF URINE CREATININE: CPT | Performed by: INTERNAL MEDICINE

## 2022-07-05 PROCEDURE — 3075F PR MOST RECENT SYSTOLIC BLOOD PRESS GE 130-139MM HG: ICD-10-PCS | Mod: CPTII,S$GLB,, | Performed by: INTERNAL MEDICINE

## 2022-07-05 PROCEDURE — 3008F PR BODY MASS INDEX (BMI) DOCUMENTED: ICD-10-PCS | Mod: CPTII,S$GLB,, | Performed by: INTERNAL MEDICINE

## 2022-07-05 PROCEDURE — 80053 COMPREHEN METABOLIC PANEL: CPT | Performed by: INTERNAL MEDICINE

## 2022-07-05 PROCEDURE — 99396 PR PREVENTIVE VISIT,EST,40-64: ICD-10-PCS | Mod: S$GLB,,, | Performed by: INTERNAL MEDICINE

## 2022-07-05 PROCEDURE — 84443 ASSAY THYROID STIM HORMONE: CPT | Performed by: INTERNAL MEDICINE

## 2022-07-05 PROCEDURE — 4010F PR ACE/ARB THEARPY RXD/TAKEN: ICD-10-PCS | Mod: CPTII,S$GLB,, | Performed by: INTERNAL MEDICINE

## 2022-07-05 NOTE — PROGRESS NOTES
Subjective:       Patient ID: Amanda Ponce is a 56 y.o. female.    Chief Complaint: Annual Exam    Annual exam    Overall feeling OK    Had flu a few months ago and recovered well.      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    Not much exercise, hopes to start.    Some weight gain.  Does snore.  Denies fatigue or depression; willing to do a sleep study.    Patient Active Problem List:     Hyperlipidemia associated with type 2 diabetes mellitus     Vitamin D deficiency disease     Hypertension associated with diabetes     Type 2 diabetes mellitus with microalbuminuria, without long-term current use of insulin     Morbid obesity with BMI of 40.0-44.9, adult     Limited range of motion (ROM) of shoulder          Review of Systems   Constitutional: Negative for activity change, appetite change, chills, fatigue and fever.   HENT: Negative for congestion, hearing loss, sinus pressure and sore throat.    Eyes: Negative for visual disturbance.   Respiratory: Negative for apnea, cough, shortness of breath and wheezing.         Snoring   Cardiovascular: Negative for chest pain, palpitations and leg swelling.   Gastrointestinal: Negative for abdominal distention, abdominal pain, constipation, diarrhea, nausea and vomiting.   Genitourinary: Negative for dysuria, frequency, hematuria and vaginal bleeding.   Musculoskeletal: Negative for gait problem, joint swelling and myalgias.   Skin: Negative for rash.   Neurological: Negative for dizziness, weakness, light-headedness and headaches.   Hematological: Negative for adenopathy. Does not bruise/bleed easily.   Psychiatric/Behavioral: Negative for confusion, hallucinations, sleep disturbance and suicidal ideas.       Objective:      Physical Exam  Vitals and nursing note reviewed.   Constitutional:       Appearance: She is well-developed.   HENT:      Head: Normocephalic and atraumatic.      Right Ear: External ear normal.      Left Ear:  External ear normal.      Ears:      Comments: Cerumen R side     Nose: Nose normal.      Mouth/Throat:      Pharynx: No oropharyngeal exudate.   Eyes:      General: No scleral icterus.     Extraocular Movements: Extraocular movements intact.      Conjunctiva/sclera: Conjunctivae normal.   Neck:      Thyroid: No thyromegaly.      Vascular: No JVD.   Cardiovascular:      Rate and Rhythm: Normal rate and regular rhythm.      Pulses:           Dorsalis pedis pulses are 2+ on the right side and 2+ on the left side.        Posterior tibial pulses are 2+ on the right side and 2+ on the left side.      Heart sounds: Normal heart sounds. No murmur heard.    No gallop.   Pulmonary:      Effort: Pulmonary effort is normal. No respiratory distress.      Breath sounds: Normal breath sounds. No wheezing.   Abdominal:      General: Bowel sounds are normal. There is no distension.      Palpations: Abdomen is soft. There is no mass.      Tenderness: There is no abdominal tenderness. There is no guarding or rebound.   Musculoskeletal:         General: No tenderness. Normal range of motion.      Cervical back: Normal range of motion and neck supple.      Right foot: Normal range of motion. No deformity or bunion.      Left foot: Normal range of motion. No deformity or bunion.   Feet:      Right foot:      Protective Sensation: 5 sites tested. 5 sites sensed.      Skin integrity: No ulcer, blister or skin breakdown.      Left foot:      Protective Sensation: 5 sites tested. 5 sites sensed.      Skin integrity: No ulcer, blister or skin breakdown.   Lymphadenopathy:      Cervical: No cervical adenopathy.   Skin:     General: Skin is warm.      Findings: No erythema or rash.   Neurological:      General: No focal deficit present.      Mental Status: She is alert and oriented to person, place, and time.      Cranial Nerves: No cranial nerve deficit.      Coordination: Coordination normal.   Psychiatric:         Behavior: Behavior normal.          Thought Content: Thought content normal.         Judgment: Judgment normal.         Assessment:       1. Annual physical exam    2. Hypertension associated with diabetes    3. Type 2 diabetes mellitus with microalbuminuria, without long-term current use of insulin    4. Morbid obesity with BMI of 40.0-44.9, adult    5. Vitamin D deficiency disease    6. Hyperlipidemia associated with type 2 diabetes mellitus    7. Cerumen debris on tympanic membrane, unspecified laterality        Plan:         Amanda was seen today for annual exam.    Diagnoses and all orders for this visit:    Annual physical exam  -     Lipid Panel; Future  -     CBC Auto Differential; Future  -     Comprehensive Metabolic Panel; Future  -     Hemoglobin A1C; Future  -     Home Sleep Studies; Future  -     TSH; Future  -     Vitamin D; Future    Hypertension associated with diabetes; Low salt diet, exercise, 15-20-# weight loss in next 6-12 months' time.  Call if BP > 130/80 on a regular basis.    Type 2 diabetes mellitus with microalbuminuria, without long-term current use of insulin  -     Microalbumin/Creatinine Ratio, Urine    Morbid obesity with BMI of 40.0-44.9, adult; schedule sleep study.  Portion control, exercise and weight loss recommendations discussed, she declines bariatric assessment.  Not interested in SGLT 2    Vitamin D deficiency disease; labs and review    Hyperlipidemia associated with type 2 diabetes mellitus:  Continue regimen, diet exercise and weight loss    Cerumen debris on tympanic membrane, unspecified laterality:  Hygienic and OTC remedies reviewed, follow-up poor results    Outside eye exam to be obtained  Pneumovax and COVID booster discussed  I will review all studies and determine further tx depending on findings

## 2022-07-06 ENCOUNTER — TELEPHONE (OUTPATIENT)
Dept: INTERNAL MEDICINE | Facility: CLINIC | Age: 57
End: 2022-07-06
Payer: COMMERCIAL

## 2022-07-06 DIAGNOSIS — R06.83 SNORING: Primary | ICD-10-CM

## 2022-07-06 NOTE — TELEPHONE ENCOUNTER
Spoke to patient and advised of insurance not coving sleep study, and recommendation  That she should still be seen in the sleep clinic.     patient verbalized understanding.     Stated that she has a eye exam coming up and she will have the information faxed after her appt.

## 2022-07-06 NOTE — TELEPHONE ENCOUNTER
Please let her know that her insurance will not pay for a sleep study but I do still recommend she be evaluated for sleep apnea.  I have placed a referral for a consultation in the Sleep Clinic, please assist with scheduling     also, she is due for eye exam, if she has had this done at her eye doctor's, let us know and we can get the outside records, thank you      Dr. Malini Sullivan,     Thank you for ordering [facility] - HOME SLEEP STUDIES for patient Amanda Ponce, MRN [id number]. Unfortunately, the patient's insurance, [employer] ALL OUT OF STATE, has denied the service due to Other, N/A. This is an automated In Basket notification that does not require a reply. For a more detailed explanation, or for questions regarding this insurance denial, send an In Basket message to the Pre Service Intake pool or call the [facility] Pre-[facility] department at [phone] and reference referral ID [id number].The Pre-Service department hours are M-F 8 a.m. to 5 p.m.       Thank you,     [facility]

## 2022-07-07 ENCOUNTER — TELEPHONE (OUTPATIENT)
Dept: INTERNAL MEDICINE | Facility: CLINIC | Age: 57
End: 2022-07-07
Payer: COMMERCIAL

## 2022-07-07 DIAGNOSIS — R80.9 TYPE 2 DIABETES MELLITUS WITH MICROALBUMINURIA, WITHOUT LONG-TERM CURRENT USE OF INSULIN: Primary | ICD-10-CM

## 2022-07-07 DIAGNOSIS — E78.2 MIXED HYPERLIPIDEMIA: ICD-10-CM

## 2022-07-07 DIAGNOSIS — E11.29 TYPE 2 DIABETES MELLITUS WITH MICROALBUMINURIA, WITHOUT LONG-TERM CURRENT USE OF INSULIN: Primary | ICD-10-CM

## 2022-07-07 DIAGNOSIS — E55.9 VITAMIN D DEFICIENCY DISEASE: ICD-10-CM

## 2022-07-07 RX ORDER — ATORVASTATIN CALCIUM 20 MG/1
20 TABLET, FILM COATED ORAL DAILY
Qty: 90 TABLET | Refills: 3 | Status: SHIPPED | OUTPATIENT
Start: 2022-07-07 | End: 2022-09-10

## 2022-07-07 RX ORDER — ERGOCALCIFEROL 1.25 MG/1
50000 CAPSULE ORAL
Qty: 12 CAPSULE | Refills: 3 | Status: SHIPPED | OUTPATIENT
Start: 2022-07-07 | End: 2023-06-15

## 2022-07-07 NOTE — TELEPHONE ENCOUNTER
Spoke to pt and she stated that she stopped taking her Vit D, because that what she was advise at the last visit   Pt would like to take the once a week Vit D  Please advise

## 2022-07-07 NOTE — TELEPHONE ENCOUNTER
Okay, thank you, I sent in the vitamin-D to be taken weekly     she also should be taking Lipitor which I sent to her pharmacy     need to get a copy of her eye exam which I think she has done outside of [facility], please ask her to have that sent to us     labs in 6 months, orders in, please let me know when scheduled thanks

## 2022-07-07 NOTE — TELEPHONE ENCOUNTER
----- Message from Eulalio Soto sent at 7/7/2022 10:40 AM CDT -----  Contact: [phone]  Pt would like a call back about her vitamin D levels are low.

## 2022-07-12 ENCOUNTER — TELEPHONE (OUTPATIENT)
Dept: INTERNAL MEDICINE | Facility: CLINIC | Age: 57
End: 2022-07-12
Payer: COMMERCIAL

## 2022-07-12 RX ORDER — VIT C/E/ZN/COPPR/LUTEIN/ZEAXAN 250MG-90MG
2000 CAPSULE ORAL DAILY
Qty: 60 CAPSULE | Refills: 12 | Status: SHIPPED | OUTPATIENT
Start: 2022-07-12

## 2022-07-12 NOTE — TELEPHONE ENCOUNTER
Thank you, repeat labs in 4 months, orders in, thanks    In addition to the weekly D, she would be taking over-the-counter vitamin-D cholecalciferol 2000 units daily as well

## 2022-07-12 NOTE — TELEPHONE ENCOUNTER
Patient did not read Inez message.      Please call to review information- vitamin-D is low, how much vitamin-D is she currently taking?

## 2022-08-29 ENCOUNTER — TELEPHONE (OUTPATIENT)
Dept: OBSTETRICS AND GYNECOLOGY | Facility: CLINIC | Age: 57
End: 2022-08-29
Payer: COMMERCIAL

## 2022-08-29 NOTE — TELEPHONE ENCOUNTER
Pt was scheduled for her wwe.      ----- Message from Gena Torres sent at 8/29/2022  1:39 PM CDT -----  Regarding: Appointment Access              Name of Who is Calling:  Amanda Ponce    Who Left The Message:  Amanda Ponce      What is the request in detail:     Patient called requesting to schedule her annual well women's visit; please give a call back and further advise.   Thank you!      Reply by [facility]: No    Preferred Call Back :  [phone] (F)

## 2022-09-10 DIAGNOSIS — E78.2 MIXED HYPERLIPIDEMIA: ICD-10-CM

## 2022-09-10 RX ORDER — ATORVASTATIN CALCIUM 20 MG/1
TABLET, FILM COATED ORAL
Qty: 90 TABLET | Refills: 3 | Status: SHIPPED | OUTPATIENT
Start: 2022-09-10 | End: 2023-06-15 | Stop reason: SDUPTHER

## 2022-09-10 NOTE — TELEPHONE ENCOUNTER
No new care gaps identified.  [facility] Embedded Care Gaps. Reference number: [id number]. 9/10/2022   8:12:54 AM BISHOPT

## 2022-09-10 NOTE — TELEPHONE ENCOUNTER
Refill Decision Note   Amanda Ponce  is requesting a refill authorization.  Brief Assessment and Rationale for Refill:  Approve     Medication Therapy Plan:       Medication Reconciliation Completed: No   Comments:     No Care Gaps recommended.     Note composed:11:20 AM 09/10/2022

## 2022-09-29 ENCOUNTER — OFFICE VISIT (OUTPATIENT)
Dept: OBSTETRICS AND GYNECOLOGY | Facility: CLINIC | Age: 57
End: 2022-09-29
Payer: COMMERCIAL

## 2022-09-29 VITALS
WEIGHT: 204.81 LBS | BODY MASS INDEX: 41.37 KG/M2 | DIASTOLIC BLOOD PRESSURE: 90 MMHG | SYSTOLIC BLOOD PRESSURE: 130 MMHG

## 2022-09-29 DIAGNOSIS — Z12.31 BREAST CANCER SCREENING BY MAMMOGRAM: ICD-10-CM

## 2022-09-29 DIAGNOSIS — Z01.419 ENCOUNTER FOR GYNECOLOGICAL EXAMINATION WITHOUT ABNORMAL FINDING: Primary | ICD-10-CM

## 2022-09-29 PROCEDURE — 4010F ACE/ARB THERAPY RXD/TAKEN: CPT | Mod: CPTII,S$GLB,, | Performed by: OBSTETRICS & GYNECOLOGY

## 2022-09-29 PROCEDURE — 99999 PR PBB SHADOW E&M-EST. PATIENT-LVL III: CPT | Mod: PBBFAC,,, | Performed by: OBSTETRICS & GYNECOLOGY

## 2022-09-29 PROCEDURE — 3075F PR MOST RECENT SYSTOLIC BLOOD PRESS GE 130-139MM HG: ICD-10-PCS | Mod: CPTII,S$GLB,, | Performed by: OBSTETRICS & GYNECOLOGY

## 2022-09-29 PROCEDURE — 99386 PREV VISIT NEW AGE 40-64: CPT | Mod: S$GLB,,, | Performed by: OBSTETRICS & GYNECOLOGY

## 2022-09-29 PROCEDURE — 3066F PR DOCUMENTATION OF TREATMENT FOR NEPHROPATHY: ICD-10-PCS | Mod: CPTII,S$GLB,, | Performed by: OBSTETRICS & GYNECOLOGY

## 2022-09-29 PROCEDURE — 3080F PR MOST RECENT DIASTOLIC BLOOD PRESSURE >= 90 MM HG: ICD-10-PCS | Mod: CPTII,S$GLB,, | Performed by: OBSTETRICS & GYNECOLOGY

## 2022-09-29 PROCEDURE — 1159F MED LIST DOCD IN RCRD: CPT | Mod: CPTII,S$GLB,, | Performed by: OBSTETRICS & GYNECOLOGY

## 2022-09-29 PROCEDURE — 3066F NEPHROPATHY DOC TX: CPT | Mod: CPTII,S$GLB,, | Performed by: OBSTETRICS & GYNECOLOGY

## 2022-09-29 PROCEDURE — 3044F PR MOST RECENT HEMOGLOBIN A1C LEVEL <7.0%: ICD-10-PCS | Mod: CPTII,S$GLB,, | Performed by: OBSTETRICS & GYNECOLOGY

## 2022-09-29 PROCEDURE — 3008F PR BODY MASS INDEX (BMI) DOCUMENTED: ICD-10-PCS | Mod: CPTII,S$GLB,, | Performed by: OBSTETRICS & GYNECOLOGY

## 2022-09-29 PROCEDURE — 3075F SYST BP GE 130 - 139MM HG: CPT | Mod: CPTII,S$GLB,, | Performed by: OBSTETRICS & GYNECOLOGY

## 2022-09-29 PROCEDURE — 4010F PR ACE/ARB THEARPY RXD/TAKEN: ICD-10-PCS | Mod: CPTII,S$GLB,, | Performed by: OBSTETRICS & GYNECOLOGY

## 2022-09-29 PROCEDURE — 99999 PR PBB SHADOW E&M-EST. PATIENT-LVL III: ICD-10-PCS | Mod: PBBFAC,,, | Performed by: OBSTETRICS & GYNECOLOGY

## 2022-09-29 PROCEDURE — 3061F PR NEG MICROALBUMINURIA RESULT DOCUMENTED/REVIEW: ICD-10-PCS | Mod: CPTII,S$GLB,, | Performed by: OBSTETRICS & GYNECOLOGY

## 2022-09-29 PROCEDURE — 1159F PR MEDICATION LIST DOCUMENTED IN MEDICAL RECORD: ICD-10-PCS | Mod: CPTII,S$GLB,, | Performed by: OBSTETRICS & GYNECOLOGY

## 2022-09-29 PROCEDURE — 3061F NEG MICROALBUMINURIA REV: CPT | Mod: CPTII,S$GLB,, | Performed by: OBSTETRICS & GYNECOLOGY

## 2022-09-29 PROCEDURE — 3008F BODY MASS INDEX DOCD: CPT | Mod: CPTII,S$GLB,, | Performed by: OBSTETRICS & GYNECOLOGY

## 2022-09-29 PROCEDURE — 99386 PR PREVENTIVE VISIT,NEW,40-64: ICD-10-PCS | Mod: S$GLB,,, | Performed by: OBSTETRICS & GYNECOLOGY

## 2022-09-29 PROCEDURE — 3080F DIAST BP >= 90 MM HG: CPT | Mod: CPTII,S$GLB,, | Performed by: OBSTETRICS & GYNECOLOGY

## 2022-09-29 PROCEDURE — 3044F HG A1C LEVEL LT 7.0%: CPT | Mod: CPTII,S$GLB,, | Performed by: OBSTETRICS & GYNECOLOGY

## 2022-09-29 NOTE — PROGRESS NOTES
Subjective:       Patient ID: Amanda Ponce is a 57 y.o. female.    Chief Complaint:  Well Woman      History of Present Illness  HPI  Annual Exam-Postmenopausal  Patient presents for annual exam. The patient has no complaints today. The patient is sexually active. GYN screening history: last pap: was normal and last mammogram: was normal. The patient is not taking hormone replacement therapy. Patient denies post-menopausal vaginal bleeding. The patient wears seatbelts: yes. The patient participates in regular exercise: yes. Has the patient ever been transfused or tattooed?: no. The patient reports that there is not domestic violence in her life.    GYN & OB History  No LMP recorded. Patient has had a hysterectomy.   Date of Last Pap: No result found    OB History    Para Term  AB Living   2 2 2     2   SAB IAB Ectopic Multiple Live Births           2      # Outcome Date GA Lbr Scott/2nd Weight Sex Delivery Anes PTL Lv   2 Term      Vag-Spont  N NANCY   1 Term      Vag-Spont  N NANCY     Past Medical History:  Past Medical History:   Diagnosis Date    DM (diabetes mellitus)     HTN (hypertension)     Obesity, Class II, BMI 35-39.9, with comorbidity 2016    Vitamin D deficiency disease 2015       Past Surgical History:  Past Surgical History:   Procedure Laterality Date    CARDIAC SURGERY      Age 3- murmur    COLONOSCOPY N/A 10/19/2018    Procedure: COLONOSCOPY;  Surgeon: Cali De Jesus MD;  Location: [address];  Service: Endoscopy;  Laterality: N/A;    HYSTERECTOMY      TLH WITHOUT BSO       Family History:  Family History   Problem Relation Age of Onset    Diabetes Father     COPD Father     Hypertension Father     Hypertension Mother     Hypertension Brother     No Known Problems Daughter     No Known Problems Daughter     Hypertension Brother     No Known Problems Brother     Glaucoma Neg Hx     Breast cancer Neg Hx     Colon cancer Neg Hx     Ovarian cancer Neg Hx         Allergies:  Review of patient's allergies indicates:   Allergen Reactions    Ace inhibitors      Other reaction(s): cough       Medications:  Current Outpatient Medications on File Prior to Visit   Medication Sig Dispense Refill    amLODIPine (NORVASC) 10 MG tablet Take 1 tablet (10 mg total) by mouth once daily. 90 tablet 3    atorvastatin (LIPITOR) 20 MG tablet Take 1 tablet by mouth once daily 90 tablet 3    blood sugar diagnostic (ONE TOUCH ULTRA TEST) Strp 1 each by Misc.(Non-Drug; Combo Route) route daily as needed. 35 strip 6    cholecalciferol, vitamin D3, (VITAMIN D3) 25 mcg (1,000 unit) capsule Take 2 capsules (2,000 Units total) by mouth once daily. 60 capsule 12    ergocalciferol (ERGOCALCIFEROL) 50,000 unit Cap Take 1 capsule (50,000 Units total) by mouth every 7 days. 12 capsule 3    lancets Misc 1 each by Misc.(Non-Drug; Combo Route) route once daily. 100 each 3    losartan (COZAAR) 100 MG tablet Take 1 tablet (100 mg total) by mouth once daily. 90 tablet 3    meloxicam (MOBIC) 15 MG tablet Take 1 tablet (15 mg total) by mouth daily as needed for Pain. 30 tablet 0    metFORMIN (GLUCOPHAGE-XR) 500 MG ER 24hr tablet Take 1 tablet (500 mg total) by mouth daily with breakfast. 90 tablet 3    aspirin (ECOTRIN) 81 MG EC tablet Take 1 tablet (81 mg total) by mouth once daily. (Patient not taking: Reported on 7/5/2022) 30 tablet 12     No current facility-administered medications on file prior to visit.       Social History:  Social History     Tobacco Use    Smoking status: Never    Smokeless tobacco: Never   Substance Use Topics    Alcohol use: Yes     Comment: ocassionally    Drug use: No                Review of Systems  Review of Systems   Constitutional: Negative.    HENT: Negative.     Eyes: Negative.    Respiratory: Negative.     Cardiovascular: Negative.    Gastrointestinal: Negative.    Genitourinary: Negative.  Positive for hot flashes.   Musculoskeletal: Negative.    Integumentary:   Negative.   Neurological: Negative.    Hematological: Negative.    Psychiatric/Behavioral: Negative.     Breast: negative.          Objective:    Physical Exam:   Constitutional: She is oriented to person, place, and time. She appears well-nourished.    HENT:   Head: Normocephalic and atraumatic.    Eyes: EOM are normal. Right eye exhibits normal extraocular motion. Left eye exhibits normal extraocular motion.    Neck: No thyromegaly present.    Cardiovascular:  Normal rate.             Pulmonary/Chest: Effort normal. No respiratory distress. Right breast exhibits no mass, no skin change and no tenderness. Left breast exhibits no mass, no skin change and no tenderness. Breasts are symmetrical.        Abdominal: Soft. She exhibits no distension and no mass. There is no abdominal tenderness. Hernia confirmed negative in the right inguinal area and confirmed negative in the left inguinal area.     Genitourinary:    Vagina, right adnexa and left adnexa normal.      Pelvic exam was performed with patient supine.   The external female genitalia was normal.   No external genitalia lesions identified,Genitalia hair distrobution normal .   Labial bartholins normal.There is no rash or lesion on the right labia. There is no rash or lesion on the left labia. Right adnexum displays no tenderness and no fullness. Left adnexum displays no tenderness and no fullness. Vaginal cuff normal.  No  no vaginal discharge, bleeding or unspecified prolapse of vaginal walls in the vagina. Vagina was moist.Cervix is absent.Uterus is absent. Normal urethral meatus.Urethral Meatus exhibits: urethral lesion and prolapsedUrethra findings: no urethral mass and no tendernessBladder findings: no bladder distention and no bladder tenderness          Musculoskeletal: Normal range of motion.      Lymphadenopathy:     She has no cervical adenopathy.    Neurological: She is oriented to person, place, and time.   Cranial Nerves II-XII grossly intact.     Skin: No rash noted. No erythema.    Psychiatric: She has a normal mood and affect. Her behavior is normal.        Assessment:        1. Encounter for gynecological examination without abnormal finding    2. Breast cancer screening by mammogram                Plan:      1. Encounter for gynecological examination without abnormal finding  - Pap no longer indicated.  S/p TLH for uterine fibroids  -   Screening tests as ordered.  - Diet and exercise encouraged.    Counseling: injury prevention: Driving under the influence of alcohol  Seatbelts  Perimenopause/Menopause  Stress management techniques  indications for and frequency of periodic gynecologic exam  reviewed current Pap guidelines. Explained new understanding of natural history of cervical disease and improved Paps. Recommended guideline concordant care.      2. Breast cancer screening by mammogram  - Self breast exams encouraged

## 2022-11-13 ENCOUNTER — CLINICAL SUPPORT (OUTPATIENT)
Dept: URGENT CARE | Facility: CLINIC | Age: 57
End: 2022-11-13
Payer: COMMERCIAL

## 2022-11-13 PROCEDURE — 90694 FLU VACCINE - QUADRIVALENT - ADJUVANTED: ICD-10-PCS | Mod: S$GLB,,, | Performed by: NURSE PRACTITIONER

## 2022-11-13 PROCEDURE — 90471 IMMUNIZATION ADMIN: CPT | Mod: S$GLB,,, | Performed by: NURSE PRACTITIONER

## 2022-11-13 PROCEDURE — 90471 FLU VACCINE - QUADRIVALENT - ADJUVANTED: ICD-10-PCS | Mod: S$GLB,,, | Performed by: NURSE PRACTITIONER

## 2022-11-13 PROCEDURE — 90694 VACC AIIV4 NO PRSRV 0.5ML IM: CPT | Mod: S$GLB,,, | Performed by: NURSE PRACTITIONER

## 2022-11-14 ENCOUNTER — LAB VISIT (OUTPATIENT)
Dept: LAB | Facility: HOSPITAL | Age: 57
End: 2022-11-14
Attending: INTERNAL MEDICINE
Payer: COMMERCIAL

## 2022-11-14 DIAGNOSIS — R80.9 TYPE 2 DIABETES MELLITUS WITH MICROALBUMINURIA, WITHOUT LONG-TERM CURRENT USE OF INSULIN: ICD-10-CM

## 2022-11-14 DIAGNOSIS — E11.29 TYPE 2 DIABETES MELLITUS WITH MICROALBUMINURIA, WITHOUT LONG-TERM CURRENT USE OF INSULIN: ICD-10-CM

## 2022-11-14 DIAGNOSIS — E55.9 VITAMIN D DEFICIENCY DISEASE: ICD-10-CM

## 2022-11-14 LAB
25(OH)D3+25(OH)D2 SERPL-MCNC: 58 NG/ML (ref 30–96)
ALBUMIN SERPL BCP-MCNC: 3.8 G/DL (ref 3.5–5.2)
ALP SERPL-CCNC: 103 U/L (ref 55–135)
ALT SERPL W/O P-5'-P-CCNC: 21 U/L (ref 10–44)
ANION GAP SERPL CALC-SCNC: 10 MMOL/L (ref 8–16)
AST SERPL-CCNC: 20 U/L (ref 10–40)
BILIRUB SERPL-MCNC: 0.5 MG/DL (ref 0.1–1)
BUN SERPL-MCNC: 16 MG/DL (ref 6–20)
CALCIUM SERPL-MCNC: 9.6 MG/DL (ref 8.7–10.5)
CHLORIDE SERPL-SCNC: 102 MMOL/L (ref 95–110)
CO2 SERPL-SCNC: 27 MMOL/L (ref 23–29)
CREAT SERPL-MCNC: 0.8 MG/DL (ref 0.5–1.4)
EST. GFR  (NO RACE VARIABLE): >60 ML/MIN/1.73 M^2
ESTIMATED AVG GLUCOSE: 143 MG/DL (ref 68–131)
GLUCOSE SERPL-MCNC: 116 MG/DL (ref 70–110)
HBA1C MFR BLD: 6.6 % (ref 4–5.6)
POTASSIUM SERPL-SCNC: 4.2 MMOL/L (ref 3.5–5.1)
PROT SERPL-MCNC: 8.4 G/DL (ref 6–8.4)
SODIUM SERPL-SCNC: 139 MMOL/L (ref 136–145)

## 2022-11-14 PROCEDURE — 80053 COMPREHEN METABOLIC PANEL: CPT | Performed by: INTERNAL MEDICINE

## 2022-11-14 PROCEDURE — 83036 HEMOGLOBIN GLYCOSYLATED A1C: CPT | Performed by: INTERNAL MEDICINE

## 2022-11-14 PROCEDURE — 82306 VITAMIN D 25 HYDROXY: CPT | Performed by: INTERNAL MEDICINE

## 2022-11-14 PROCEDURE — 36415 COLL VENOUS BLD VENIPUNCTURE: CPT | Mod: PO | Performed by: INTERNAL MEDICINE

## 2023-01-09 ENCOUNTER — PATIENT OUTREACH (OUTPATIENT)
Dept: ADMINISTRATIVE | Facility: HOSPITAL | Age: 58
End: 2023-01-09
Payer: COMMERCIAL

## 2023-01-09 NOTE — LETTER
AUTHORIZATION FOR RELEASE OF   CONFIDENTIAL INFORMATION    Dear Dr. Mario Vyas,    We are seeing Amanda Ponce, date of birth 1965, in the clinic at [facility]. Malini Sullivan MD is the patient's PCP. Amanda Ponce has an outstanding lab/procedure at the time we reviewed her chart. In order to help keep her health information updated, she has authorized us to request the following medical record(s):        (  )  MAMMOGRAM                                      (  )  COLONOSCOPY      (  )  PAP SMEAR                                          (  )  OUTSIDE LAB RESULTS     (  )  DEXA SCAN                                          ( X )  EYE EXAM            (  )  FOOT EXAM                                          (  )  ENTIRE RECORD     (  )  OUTSIDE IMMUNIZATIONS                 (  )  _______________         Please fax records to Malini Sullivan MD, [phone]     If you have any questions, please contact ABDIAZIZ Tee at [phone].           Patient Name: Amanda Ponce  : 1965  Patient Phone #: [phone]

## 2023-01-09 NOTE — PROGRESS NOTES
Health Maintenance Due   Topic Date Due    Pneumococcal Vaccines (Age 0-64) (2 - PCV) 08/23/2013    Eye Exam  09/03/2021    COVID-19 Vaccine (4 - Booster for Pfizer series) 12/18/2021     Triggered LINKS. Updated Care Everywhere. Record request sent to Dr. Mario Vyas asking for a copy of pt's most recent eye exam results. Chart review completed.

## 2023-02-01 DIAGNOSIS — E11.9 TYPE 2 DIABETES MELLITUS WITHOUT COMPLICATION, UNSPECIFIED WHETHER LONG TERM INSULIN USE: ICD-10-CM

## 2023-02-03 ENCOUNTER — IMMUNIZATION (OUTPATIENT)
Dept: INTERNAL MEDICINE | Facility: CLINIC | Age: 58
End: 2023-02-03
Payer: COMMERCIAL

## 2023-02-03 DIAGNOSIS — Z23 NEED FOR VACCINATION: Primary | ICD-10-CM

## 2023-02-03 PROCEDURE — 91312 COVID-19, MRNA, LNP-S, BIVALENT BOOSTER, PF, 30 MCG/0.3 ML DOSE: ICD-10-PCS | Mod: S$GLB,,, | Performed by: INTERNAL MEDICINE

## 2023-02-03 PROCEDURE — 91312 COVID-19, MRNA, LNP-S, BIVALENT BOOSTER, PF, 30 MCG/0.3 ML DOSE: CPT | Mod: S$GLB,,, | Performed by: INTERNAL MEDICINE

## 2023-02-03 PROCEDURE — 0124A COVID-19, MRNA, LNP-S, BIVALENT BOOSTER, PF, 30 MCG/0.3 ML DOSE: CPT | Mod: CV19,PBBFAC | Performed by: INTERNAL MEDICINE

## 2023-02-06 ENCOUNTER — PATIENT MESSAGE (OUTPATIENT)
Dept: ADMINISTRATIVE | Facility: HOSPITAL | Age: 58
End: 2023-02-06
Payer: COMMERCIAL

## 2023-02-20 DIAGNOSIS — I10 ESSENTIAL HYPERTENSION: ICD-10-CM

## 2023-02-20 NOTE — TELEPHONE ENCOUNTER
----- Message from Angelo Stroud sent at 2/20/2023 10:06 AM CST -----  Contact: Pt [phone]  Requesting an RX refill or new RX.  Is this a refill or new RX: Refill  RX name and strengthamLODIPine (NORVASC) 10 MG tablet and losartan (COZAAR) 100 MG tablet  Is this a 30 day or 90 day RX:   Pharmacy name and phone # [facility] [address] [place] [address] [address]   Phone:  [phone] Fax:  [phone]

## 2023-02-20 NOTE — TELEPHONE ENCOUNTER
Care Due:                  Date            Visit Type   Department     Provider  --------------------------------------------------------------------------------                                EP -                              PRIMARY      [facility]  Last Visit: 07-      CARE ([facility])   AMILCAR Sullivan                              EP -                              PRIMARY      [facility]  Next Visit: 06-      CARE ([facility])   AMILCAR Sullivan                                                            Last  Test          Frequency    Reason                     Performed    Due Date  --------------------------------------------------------------------------------    HBA1C.......  6 months...  metFORMIN................  11- 05-    [facility] Embedded Care Gaps. Reference number: [id number]. 2/20/2023   10:32:28 AM CST

## 2023-02-21 RX ORDER — AMLODIPINE BESYLATE 10 MG/1
10 TABLET ORAL DAILY
Qty: 90 TABLET | Refills: 1 | Status: SHIPPED | OUTPATIENT
Start: 2023-02-21 | End: 2023-06-15 | Stop reason: SDUPTHER

## 2023-02-21 NOTE — TELEPHONE ENCOUNTER
Refill Routing Note     Refill Routing Note   Medication(s) are not appropriate for processing by [facility] for the following reason(s):       Required vitals abnormal    [facility] action(s):  Defer   Requires labs : Yes         Appointments  past 12m or future 3m with PCP    Date Provider   Last Visit   7/5/2022 Malini Sullivan MD   Next Visit   6/15/2023 Malini Sullivan MD   ED visits in past 90 days: 0        Note composed:7:08 AM 02/21/2023

## 2023-02-27 DIAGNOSIS — I10 ESSENTIAL HYPERTENSION: ICD-10-CM

## 2023-02-27 RX ORDER — LOSARTAN POTASSIUM 100 MG/1
100 TABLET ORAL DAILY
Qty: 90 TABLET | Refills: 3 | Status: SHIPPED | OUTPATIENT
Start: 2023-02-27 | End: 2023-06-15 | Stop reason: SDUPTHER

## 2023-02-27 NOTE — TELEPHONE ENCOUNTER
No new care gaps identified.  [facility] Embedded Care Gaps. Reference number: [id number]. 2/27/2023   9:32:00 AM CST

## 2023-03-16 LAB
LEFT EYE DM RETINOPATHY: NEGATIVE
RIGHT EYE DM RETINOPATHY: NEGATIVE

## 2023-04-03 ENCOUNTER — PATIENT MESSAGE (OUTPATIENT)
Dept: ADMINISTRATIVE | Facility: HOSPITAL | Age: 58
End: 2023-04-03
Payer: COMMERCIAL

## 2023-06-07 ENCOUNTER — TELEPHONE (OUTPATIENT)
Dept: INTERNAL MEDICINE | Facility: CLINIC | Age: 58
End: 2023-06-07
Payer: COMMERCIAL

## 2023-06-07 DIAGNOSIS — E11.29 TYPE 2 DIABETES MELLITUS WITH MICROALBUMINURIA, WITHOUT LONG-TERM CURRENT USE OF INSULIN: Primary | ICD-10-CM

## 2023-06-07 DIAGNOSIS — E11.9 TYPE 2 DIABETES MELLITUS WITHOUT COMPLICATION: ICD-10-CM

## 2023-06-07 DIAGNOSIS — R80.9 TYPE 2 DIABETES MELLITUS WITH MICROALBUMINURIA, WITHOUT LONG-TERM CURRENT USE OF INSULIN: Primary | ICD-10-CM

## 2023-06-10 ENCOUNTER — LAB VISIT (OUTPATIENT)
Dept: LAB | Facility: HOSPITAL | Age: 58
End: 2023-06-10
Attending: INTERNAL MEDICINE
Payer: COMMERCIAL

## 2023-06-10 DIAGNOSIS — E11.9 TYPE 2 DIABETES MELLITUS WITHOUT COMPLICATION: ICD-10-CM

## 2023-06-10 LAB
ESTIMATED AVG GLUCOSE: 134 MG/DL (ref 68–131)
HBA1C MFR BLD: 6.3 % (ref 4–5.6)

## 2023-06-10 PROCEDURE — 83036 HEMOGLOBIN GLYCOSYLATED A1C: CPT | Performed by: INTERNAL MEDICINE

## 2023-06-10 PROCEDURE — 36415 COLL VENOUS BLD VENIPUNCTURE: CPT | Mod: PO | Performed by: INTERNAL MEDICINE

## 2023-06-12 ENCOUNTER — PATIENT MESSAGE (OUTPATIENT)
Dept: ADMINISTRATIVE | Facility: HOSPITAL | Age: 58
End: 2023-06-12
Payer: COMMERCIAL

## 2023-06-15 ENCOUNTER — HOSPITAL ENCOUNTER (OUTPATIENT)
Dept: RADIOLOGY | Facility: HOSPITAL | Age: 58
Discharge: HOME | End: 2023-06-15
Attending: INTERNAL MEDICINE
Payer: COMMERCIAL

## 2023-06-15 ENCOUNTER — OFFICE VISIT (OUTPATIENT)
Dept: INTERNAL MEDICINE | Facility: CLINIC | Age: 58
End: 2023-06-15
Payer: COMMERCIAL

## 2023-06-15 VITALS
SYSTOLIC BLOOD PRESSURE: 125 MMHG | WEIGHT: 200 LBS | DIASTOLIC BLOOD PRESSURE: 80 MMHG | BODY MASS INDEX: 40.32 KG/M2 | HEIGHT: 59 IN

## 2023-06-15 DIAGNOSIS — M54.9 DORSALGIA, UNSPECIFIED: ICD-10-CM

## 2023-06-15 DIAGNOSIS — M79.673 HEEL PAIN, UNSPECIFIED LATERALITY: ICD-10-CM

## 2023-06-15 DIAGNOSIS — R80.9 TYPE 2 DIABETES MELLITUS WITH MICROALBUMINURIA, WITHOUT LONG-TERM CURRENT USE OF INSULIN: ICD-10-CM

## 2023-06-15 DIAGNOSIS — E66.01 MORBID OBESITY WITH BMI OF 40.0-44.9, ADULT: ICD-10-CM

## 2023-06-15 DIAGNOSIS — Z00.00 ANNUAL PHYSICAL EXAM: Primary | ICD-10-CM

## 2023-06-15 DIAGNOSIS — Z12.31 SCREENING MAMMOGRAM, ENCOUNTER FOR: ICD-10-CM

## 2023-06-15 DIAGNOSIS — E11.29 TYPE 2 DIABETES MELLITUS WITH MICROALBUMINURIA, WITHOUT LONG-TERM CURRENT USE OF INSULIN: ICD-10-CM

## 2023-06-15 DIAGNOSIS — G47.30 SLEEP APNEA, UNSPECIFIED TYPE: ICD-10-CM

## 2023-06-15 DIAGNOSIS — E78.2 MIXED HYPERLIPIDEMIA: ICD-10-CM

## 2023-06-15 DIAGNOSIS — I15.2 HYPERTENSION ASSOCIATED WITH DIABETES: ICD-10-CM

## 2023-06-15 DIAGNOSIS — E78.5 HYPERLIPIDEMIA ASSOCIATED WITH TYPE 2 DIABETES MELLITUS: ICD-10-CM

## 2023-06-15 DIAGNOSIS — I10 ESSENTIAL HYPERTENSION: ICD-10-CM

## 2023-06-15 DIAGNOSIS — E11.69 HYPERLIPIDEMIA ASSOCIATED WITH TYPE 2 DIABETES MELLITUS: ICD-10-CM

## 2023-06-15 DIAGNOSIS — E11.59 HYPERTENSION ASSOCIATED WITH DIABETES: ICD-10-CM

## 2023-06-15 LAB
ALBUMIN/CREAT UR: NORMAL UG/MG (ref 0–30)
CREAT UR-MCNC: 39 MG/DL (ref 15–325)
MICROALBUMIN UR DL<=1MG/L-MCNC: <5 UG/ML

## 2023-06-15 PROCEDURE — 3079F PR MOST RECENT DIASTOLIC BLOOD PRESSURE 80-89 MM HG: ICD-10-PCS | Mod: CPTII,S$GLB,, | Performed by: INTERNAL MEDICINE

## 2023-06-15 PROCEDURE — 99396 PREV VISIT EST AGE 40-64: CPT | Mod: S$GLB,,, | Performed by: INTERNAL MEDICINE

## 2023-06-15 PROCEDURE — 3008F PR BODY MASS INDEX (BMI) DOCUMENTED: ICD-10-PCS | Mod: CPTII,S$GLB,, | Performed by: INTERNAL MEDICINE

## 2023-06-15 PROCEDURE — 3074F SYST BP LT 130 MM HG: CPT | Mod: CPTII,S$GLB,, | Performed by: INTERNAL MEDICINE

## 2023-06-15 PROCEDURE — 99999 PR PBB SHADOW E&M-EST. PATIENT-LVL IV: CPT | Mod: PBBFAC,,, | Performed by: INTERNAL MEDICINE

## 2023-06-15 PROCEDURE — 1159F PR MEDICATION LIST DOCUMENTED IN MEDICAL RECORD: ICD-10-PCS | Mod: CPTII,S$GLB,, | Performed by: INTERNAL MEDICINE

## 2023-06-15 PROCEDURE — 73650 X-RAY EXAM OF HEEL: CPT | Mod: 26,RT,, | Performed by: RADIOLOGY

## 2023-06-15 PROCEDURE — 3044F PR MOST RECENT HEMOGLOBIN A1C LEVEL <7.0%: ICD-10-PCS | Mod: CPTII,S$GLB,, | Performed by: INTERNAL MEDICINE

## 2023-06-15 PROCEDURE — 3079F DIAST BP 80-89 MM HG: CPT | Mod: CPTII,S$GLB,, | Performed by: INTERNAL MEDICINE

## 2023-06-15 PROCEDURE — 4010F PR ACE/ARB THEARPY RXD/TAKEN: ICD-10-PCS | Mod: CPTII,S$GLB,, | Performed by: INTERNAL MEDICINE

## 2023-06-15 PROCEDURE — 82570 ASSAY OF URINE CREATININE: CPT | Performed by: INTERNAL MEDICINE

## 2023-06-15 PROCEDURE — 1159F MED LIST DOCD IN RCRD: CPT | Mod: CPTII,S$GLB,, | Performed by: INTERNAL MEDICINE

## 2023-06-15 PROCEDURE — 4010F ACE/ARB THERAPY RXD/TAKEN: CPT | Mod: CPTII,S$GLB,, | Performed by: INTERNAL MEDICINE

## 2023-06-15 PROCEDURE — 72100 XR LUMBAR SPINE AP AND LATERAL: ICD-10-PCS | Mod: 26,,, | Performed by: RADIOLOGY

## 2023-06-15 PROCEDURE — 99999 PR PBB SHADOW E&M-EST. PATIENT-LVL IV: ICD-10-PCS | Mod: PBBFAC,,, | Performed by: INTERNAL MEDICINE

## 2023-06-15 PROCEDURE — 99396 PR PREVENTIVE VISIT,EST,40-64: ICD-10-PCS | Mod: S$GLB,,, | Performed by: INTERNAL MEDICINE

## 2023-06-15 PROCEDURE — 72100 X-RAY EXAM L-S SPINE 2/3 VWS: CPT | Mod: TC

## 2023-06-15 PROCEDURE — 73650 X-RAY EXAM OF HEEL: CPT | Mod: TC,50

## 2023-06-15 PROCEDURE — 72100 X-RAY EXAM L-S SPINE 2/3 VWS: CPT | Mod: 26,,, | Performed by: RADIOLOGY

## 2023-06-15 PROCEDURE — 3008F BODY MASS INDEX DOCD: CPT | Mod: CPTII,S$GLB,, | Performed by: INTERNAL MEDICINE

## 2023-06-15 PROCEDURE — 3074F PR MOST RECENT SYSTOLIC BLOOD PRESSURE < 130 MM HG: ICD-10-PCS | Mod: CPTII,S$GLB,, | Performed by: INTERNAL MEDICINE

## 2023-06-15 PROCEDURE — 73650 PR  X-RAY HEEL: ICD-10-PCS | Mod: 26,LT,, | Performed by: RADIOLOGY

## 2023-06-15 PROCEDURE — 73650 X-RAY EXAM OF HEEL: CPT | Mod: 26,LT,, | Performed by: RADIOLOGY

## 2023-06-15 PROCEDURE — 3044F HG A1C LEVEL LT 7.0%: CPT | Mod: CPTII,S$GLB,, | Performed by: INTERNAL MEDICINE

## 2023-06-15 RX ORDER — LOSARTAN POTASSIUM 100 MG/1
100 TABLET ORAL DAILY
Qty: 90 TABLET | Refills: 3 | Status: SHIPPED | OUTPATIENT
Start: 2023-06-15 | End: 2024-06-14

## 2023-06-15 RX ORDER — ORAL SEMAGLUTIDE 3 MG/1
3 TABLET ORAL DAILY
Qty: 30 TABLET | Refills: 0 | Status: SHIPPED | OUTPATIENT
Start: 2023-06-15 | End: 2023-07-27

## 2023-06-15 RX ORDER — AMLODIPINE BESYLATE 10 MG/1
10 TABLET ORAL DAILY
Qty: 90 TABLET | Refills: 3 | Status: SHIPPED | OUTPATIENT
Start: 2023-06-15

## 2023-06-15 RX ORDER — ATORVASTATIN CALCIUM 20 MG/1
20 TABLET, FILM COATED ORAL DAILY
Qty: 90 TABLET | Refills: 3 | Status: SHIPPED | OUTPATIENT
Start: 2023-06-15 | End: 2023-09-25 | Stop reason: SDUPTHER

## 2023-06-15 NOTE — PROGRESS NOTES
Patient ID: Amanda Ponce is a 57 y.o. female.    Chief Complaint: Annual Exam      Assessment:       1. Annual physical exam    2. Essential hypertension    3. Mixed hyperlipidemia    4. Type 2 diabetes mellitus with microalbuminuria, without long-term current use of insulin    5. Hypertension associated with diabetes    6. Hyperlipidemia associated with type 2 diabetes mellitus    7. Morbid obesity with BMI of 40.0-44.9, adult    8. Sleep apnea, unspecified type    9. Screening mammogram, encounter for    10. Heel pain, unspecified laterality    11. Dorsalgia, unspecified          Plan:         1. Annual physical exam  -     CBC Auto Differential; Future; Expected date: 06/15/2023  -     Comprehensive Metabolic Panel; Future; Expected date: 06/15/2023  -     Hemoglobin A1C; Future; Expected date: 06/15/2023  -     Lipid Panel; Future; Expected date: 06/15/2023  -     TSH; Future; Expected date: 06/15/2023  -     Vitamin B12; Future; Expected date: 06/15/2023    2. Essential hypertension  -     amLODIPine (NORVASC) 10 MG tablet; Take 1 tablet (10 mg total) by mouth once daily.  Dispense: 90 tablet; Refill: 3  -     losartan (COZAAR) 100 MG tablet; Take 1 tablet (100 mg total) by mouth once daily.  Dispense: 90 tablet; Refill: 3    3. Mixed hyperlipidemia  -     atorvastatin (LIPITOR) 20 MG tablet; Take 1 tablet (20 mg total) by mouth once daily.  Dispense: 90 tablet; Refill: 3    4. Type 2 diabetes mellitus with microalbuminuria, without long-term current use of insulin  -     semaglutide (RYBELSUS) 3 mg tablet; Take 1 tablet (3 mg total) by mouth once daily.  Dispense: 30 tablet; Refill: 0  -     Microalbumin/Creatinine Ratio, Urine    5. Hypertension associated with diabetes    6. Hyperlipidemia associated with type 2 diabetes mellitus    7. Morbid obesity with BMI of 40.0-44.9, adult    8. Sleep apnea, unspecified type  -     Home Sleep Study; Future    9. Screening mammogram, encounter for  -     Mammo  Digital Screening Bilat w/ Red; Standing    10. Heel pain, unspecified laterality  -     X-Ray Calcaneus Bilateral; Future; Expected date: 06/15/2023    11. Dorsalgia, unspecified  -     X-Ray Lumbar Spine Ap And Lateral       Low salt diet, exercise, 15-20-# weight loss in next 6-12 months' time.  Call if BP > 130/80 on a regular basis.   I will review all studies and determine further tx depending on findings  Med side effects reviewed  1 month follow up  CARLOS discussed  Prevnar recommeded       Subjective:   Annual exam    DM- not tolerating metformin.  Discussed semaglutide.  Cautions and s/e reviewed.    Weight reviewed.  She is not sure about CARLOS.  Not exercising.  Thinking about starting.  Does not feel she needs to see a dietician.    BP stable.  Labs reviewed.    Patient Active Problem List:     Hyperlipidemia associated with type 2 diabetes mellitus     Vitamin D deficiency disease     Hypertension associated with diabetes     Type 2 diabetes mellitus with microalbuminuria, without long-term current use of insulin     Morbid obesity with BMI of 40.0-44.9, adult     Limited range of motion (ROM) of shoulder         Review of Systems   Constitutional:  Negative for fatigue.   HENT:  Negative for hearing loss.    Eyes:  Negative for visual disturbance.   Respiratory:  Negative for cough, chest tightness and shortness of breath.         Snoring, not sure about stopping breathing   says she is snoring more loudly   Cardiovascular:  Negative for chest pain.   Gastrointestinal:  Negative for abdominal pain, constipation and diarrhea.   Genitourinary:  Negative for dysuria, frequency and vaginal bleeding.   Musculoskeletal:  Positive for arthralgias and back pain. Negative for joint swelling.        Bilateral heel pain x 2-3 weeks  Slight dull back pain x months- no trauma, no radiation  No weakness of legs  No GI or  sx   Skin:  Negative for rash.   Neurological:  Negative for weakness, light-headedness  and headaches.   Psychiatric/Behavioral:  Negative for dysphoric mood and sleep disturbance.        Objective:      Physical Exam  Vitals and nursing note reviewed.   Constitutional:       Appearance: She is well-developed.   HENT:      Head: Normocephalic and atraumatic.      Right Ear: External ear normal.      Left Ear: External ear normal.      Nose: Nose normal.      Mouth/Throat:      Pharynx: No oropharyngeal exudate.   Eyes:      General: No scleral icterus.     Extraocular Movements: Extraocular movements intact.      Conjunctiva/sclera: Conjunctivae normal.   Neck:      Thyroid: No thyromegaly.      Vascular: No JVD.   Cardiovascular:      Rate and Rhythm: Normal rate and regular rhythm.      Pulses:           Dorsalis pedis pulses are 2+ on the right side and 2+ on the left side.        Posterior tibial pulses are 2+ on the right side and 2+ on the left side.      Heart sounds: Normal heart sounds. No murmur heard.    No gallop.   Pulmonary:      Effort: Pulmonary effort is normal. No respiratory distress.      Breath sounds: Normal breath sounds. No wheezing.   Abdominal:      General: Bowel sounds are normal. There is no distension.      Palpations: Abdomen is soft. There is no mass.      Tenderness: There is no abdominal tenderness. There is no guarding or rebound.   Musculoskeletal:         General: No tenderness. Normal range of motion.      Cervical back: Normal range of motion and neck supple.      Right foot: Normal range of motion. No deformity or bunion.      Left foot: Normal range of motion. No deformity or bunion.   Feet:      Right foot:      Protective Sensation: 5 sites tested.  5 sites sensed.      Skin integrity: No ulcer, blister or skin breakdown.      Left foot:      Protective Sensation: 5 sites tested.  5 sites sensed.      Skin integrity: No ulcer, blister or skin breakdown.   Lymphadenopathy:      Cervical: No cervical adenopathy.   Skin:     General: Skin is warm.      Findings:  No erythema or rash.   Neurological:      General: No focal deficit present.      Mental Status: She is alert and oriented to person, place, and time.      Cranial Nerves: No cranial nerve deficit.      Coordination: Coordination normal.   Psychiatric:         Behavior: Behavior normal.         Thought Content: Thought content normal.         Judgment: Judgment normal.           Health Maintenance Due   Topic Date Due    Pneumococcal Vaccines (Age 0-64) (2 - PCV) 08/23/2013    Eye Exam  09/03/2021    Mammogram  06/29/2023    Diabetes Urine Screening  07/05/2023    Foot Exam  07/05/2023

## 2023-06-16 ENCOUNTER — TELEPHONE (OUTPATIENT)
Dept: INTERNAL MEDICINE | Facility: CLINIC | Age: 58
End: 2023-06-16
Payer: COMMERCIAL

## 2023-06-16 DIAGNOSIS — M54.9 BILATERAL BACK PAIN, UNSPECIFIED BACK LOCATION, UNSPECIFIED CHRONICITY: Primary | ICD-10-CM

## 2023-06-16 NOTE — TELEPHONE ENCOUNTER
----- Message from Kade Ponce sent at 6/16/2023 11:16 AM CDT -----  Contact: [phone]  Pt would like a call to discuss test results from yesterday.

## 2023-06-16 NOTE — TELEPHONE ENCOUNTER
----- Message from Francisca Wyatt sent at 6/16/2023  2:10 PM CDT -----  Contact: [phone]  Pt requesting callback for results from yesterday @ # [phone]

## 2023-06-16 NOTE — TELEPHONE ENCOUNTER
Spoke to pt and provided the results sent to her via portal by Dr. Sullivan , she would like to try physical therapy   Orders pending

## 2023-06-21 ENCOUNTER — TELEPHONE (OUTPATIENT)
Dept: INTERNAL MEDICINE | Facility: CLINIC | Age: 58
End: 2023-06-21
Payer: COMMERCIAL

## 2023-06-21 NOTE — TELEPHONE ENCOUNTER
----- Message from Christian Jalloh sent at 6/20/2023  4:05 PM CDT -----  Contact: [phone] @ patient  Good afternoon patient would a call back from nurse about a form that she left to get filled out for her insurance. Please give patient a call back [phone]

## 2023-06-28 ENCOUNTER — TELEPHONE (OUTPATIENT)
Dept: PULMONOLOGY | Facility: CLINIC | Age: 58
End: 2023-06-28
Payer: COMMERCIAL

## 2023-07-12 ENCOUNTER — PATIENT OUTREACH (OUTPATIENT)
Dept: ADMINISTRATIVE | Facility: HOSPITAL | Age: 58
End: 2023-07-12
Payer: COMMERCIAL

## 2023-07-12 NOTE — PROGRESS NOTES
Health Maintenance Due   Topic Date Due    Pneumococcal Vaccines (Age 0-64) (2 - PCV) 08/23/2013    Mammogram  06/29/2023    Lipid Panel  07/05/2023     Triggered LINKS. Updated Care Everywhere. Scanned outside [facility]. Chart review completed.

## 2023-07-14 ENCOUNTER — HOSPITAL ENCOUNTER (OUTPATIENT)
Dept: RADIOLOGY | Facility: HOSPITAL | Age: 58
Discharge: HOME | End: 2023-07-14
Attending: INTERNAL MEDICINE
Payer: COMMERCIAL

## 2023-07-14 DIAGNOSIS — Z12.31 SCREENING MAMMOGRAM, ENCOUNTER FOR: ICD-10-CM

## 2023-07-14 PROCEDURE — 77067 MAMMO DIGITAL SCREENING BILAT WITH TOMO: ICD-10-PCS | Mod: 26,,, | Performed by: RADIOLOGY

## 2023-07-14 PROCEDURE — 77067 SCR MAMMO BI INCL CAD: CPT | Mod: 26,,, | Performed by: RADIOLOGY

## 2023-07-14 PROCEDURE — 77063 BREAST TOMOSYNTHESIS BI: CPT | Mod: 26,,, | Performed by: RADIOLOGY

## 2023-07-14 PROCEDURE — 77063 MAMMO DIGITAL SCREENING BILAT WITH TOMO: ICD-10-PCS | Mod: 26,,, | Performed by: RADIOLOGY

## 2023-07-14 PROCEDURE — 77067 SCR MAMMO BI INCL CAD: CPT | Mod: TC

## 2023-07-21 ENCOUNTER — HOSPITAL ENCOUNTER (OUTPATIENT)
Dept: SLEEP MEDICINE | Facility: HOSPITAL | Age: 58
Discharge: HOME | End: 2023-07-21
Attending: INTERNAL MEDICINE
Payer: COMMERCIAL

## 2023-07-21 DIAGNOSIS — G47.30 SLEEP APNEA, UNSPECIFIED TYPE: ICD-10-CM

## 2023-07-21 PROCEDURE — 95800 SLP STDY UNATTENDED: CPT

## 2023-07-24 PROBLEM — G47.30 SLEEP APNEA: Status: ACTIVE | Noted: 2023-07-24

## 2023-07-26 DIAGNOSIS — R80.9 TYPE 2 DIABETES MELLITUS WITH MICROALBUMINURIA, WITHOUT LONG-TERM CURRENT USE OF INSULIN: ICD-10-CM

## 2023-07-26 DIAGNOSIS — E11.29 TYPE 2 DIABETES MELLITUS WITH MICROALBUMINURIA, WITHOUT LONG-TERM CURRENT USE OF INSULIN: ICD-10-CM

## 2023-07-26 DIAGNOSIS — G47.30 SLEEP APNEA, UNSPECIFIED TYPE: Primary | ICD-10-CM

## 2023-07-26 PROCEDURE — 95801 SLP STDY UNATND W/ANAL: CPT | Mod: 26,,, | Performed by: INTERNAL MEDICINE

## 2023-07-26 PROCEDURE — 95801 PR SLEEP STUDY, UNATTENDED, RECORD  HEART RATE/O2 SAT/RESP ANALYSIS: ICD-10-PCS | Mod: 26,,, | Performed by: INTERNAL MEDICINE

## 2023-07-26 RX ORDER — ORAL SEMAGLUTIDE 3 MG/1
3 TABLET ORAL DAILY
Qty: 30 TABLET | Refills: 0 | Status: CANCELLED | OUTPATIENT
Start: 2023-07-26

## 2023-07-26 NOTE — TELEPHONE ENCOUNTER
Care Due:                  Date            Visit Type   Department     Provider  --------------------------------------------------------------------------------                                EP -                              PRIMARY      [facility] INTERNAL  Last Visit: 06-      CARE ([facility])   MEDICINE       Malini Sullivan  Next Visit: None Scheduled  None         None Found                                                            Last  Test          Frequency    Reason                     Performed    Due Date  --------------------------------------------------------------------------------    Lipid Panel.  12 months..  atorvastatin.............  07- 06-    [facility] Embedded Care Due Messages. Reference number: [id number].   7/26/2023 3:15:22 PM CDT

## 2023-07-26 NOTE — PROCEDURES
"Dear Provider,     You have ordered sleep LAB services to perform the sleep study for Amanda Ponce.  The sleep study that you ordered is complete.      Please find Sleep Study result in "Chart Review" under the "Media tab."      As the ordering provider, you are responsible for reviewing the results and implementing a treatment plan with your patient.    If you need a Sleep Medicine provider to explain the sleep study findings and arrange treatment for the patient, please refer patient for consultation to our Sleep Clinic via [facility] with Ambulatory Consult Sleep.    To do that please place an order for an  "Ambulatory Consult Sleep" - it will go to our clinic work queue for our Medical Assistant to contact the patient for an appointment.     For any questions, please contact our clinic staff at [phone] to talk to clinical staff.    "

## 2023-07-26 NOTE — TELEPHONE ENCOUNTER
Please call, how is she doing with her weight?  We usually recommend increasing the dose of the medication (Rybelsus) after 1 month    Also, she has very severe sleep apnea needs to be on treatment.  She needs to be seen in the Sleep Clinic as soon as possible.  Please assist with appointment, referral is in thank you

## 2023-07-27 RX ORDER — ORAL SEMAGLUTIDE 7 MG/1
7 TABLET ORAL DAILY
Qty: 30 TABLET | Refills: 0 | Status: SHIPPED | OUTPATIENT
Start: 2023-07-27

## 2023-07-27 NOTE — TELEPHONE ENCOUNTER
Called and spoke with patient. Relayed Dr. Sullivan's message. Pt verbalized understanding.  Pt has not weighed herself thus far. Pt states she is not eating as much. Will weigh herself and send weight in portal message.  Denies nausea, vomiting, diarrhea.    Pt taking for 1 month, starting June 22nd. Confirmed pharmacy, [facility].    Apt scheduled with sleep clinic.

## 2023-08-03 ENCOUNTER — CLINICAL SUPPORT (OUTPATIENT)
Dept: REHABILITATION | Facility: HOSPITAL | Age: 58
End: 2023-08-03
Attending: INTERNAL MEDICINE
Payer: COMMERCIAL

## 2023-08-03 DIAGNOSIS — M54.9 BILATERAL BACK PAIN, UNSPECIFIED BACK LOCATION, UNSPECIFIED CHRONICITY: ICD-10-CM

## 2023-08-03 PROCEDURE — 97140 MANUAL THERAPY 1/> REGIONS: CPT | Mod: PN

## 2023-08-03 PROCEDURE — 97110 THERAPEUTIC EXERCISES: CPT | Mod: PN

## 2023-08-03 PROCEDURE — 97161 PT EVAL LOW COMPLEX 20 MIN: CPT | Mod: PN

## 2023-08-10 ENCOUNTER — OFFICE VISIT (OUTPATIENT)
Dept: SLEEP MEDICINE | Facility: CLINIC | Age: 58
End: 2023-08-10
Payer: COMMERCIAL

## 2023-08-10 ENCOUNTER — TELEPHONE (OUTPATIENT)
Dept: INTERNAL MEDICINE | Facility: CLINIC | Age: 58
End: 2023-08-10
Payer: COMMERCIAL

## 2023-08-10 VITALS
DIASTOLIC BLOOD PRESSURE: 84 MMHG | BODY MASS INDEX: 40.32 KG/M2 | HEART RATE: 90 BPM | HEIGHT: 59 IN | SYSTOLIC BLOOD PRESSURE: 131 MMHG | WEIGHT: 200 LBS

## 2023-08-10 DIAGNOSIS — G47.33 SEVERE OBSTRUCTIVE SLEEP APNEA: Primary | ICD-10-CM

## 2023-08-10 PROCEDURE — 3075F SYST BP GE 130 - 139MM HG: CPT | Mod: CPTII,S$GLB,, | Performed by: PHYSICIAN ASSISTANT

## 2023-08-10 PROCEDURE — 99213 PR OFFICE/OUTPT VISIT, EST, LEVL III, 20-29 MIN: ICD-10-PCS | Mod: S$GLB,,, | Performed by: PHYSICIAN ASSISTANT

## 2023-08-10 PROCEDURE — 3061F PR NEG MICROALBUMINURIA RESULT DOCUMENTED/REVIEW: ICD-10-PCS | Mod: CPTII,S$GLB,, | Performed by: PHYSICIAN ASSISTANT

## 2023-08-10 PROCEDURE — 3079F DIAST BP 80-89 MM HG: CPT | Mod: CPTII,S$GLB,, | Performed by: PHYSICIAN ASSISTANT

## 2023-08-10 PROCEDURE — 3079F PR MOST RECENT DIASTOLIC BLOOD PRESSURE 80-89 MM HG: ICD-10-PCS | Mod: CPTII,S$GLB,, | Performed by: PHYSICIAN ASSISTANT

## 2023-08-10 PROCEDURE — 1159F PR MEDICATION LIST DOCUMENTED IN MEDICAL RECORD: ICD-10-PCS | Mod: CPTII,S$GLB,, | Performed by: PHYSICIAN ASSISTANT

## 2023-08-10 PROCEDURE — 99999 PR PBB SHADOW E&M-EST. PATIENT-LVL III: CPT | Mod: PBBFAC,,, | Performed by: PHYSICIAN ASSISTANT

## 2023-08-10 PROCEDURE — 1159F MED LIST DOCD IN RCRD: CPT | Mod: CPTII,S$GLB,, | Performed by: PHYSICIAN ASSISTANT

## 2023-08-10 PROCEDURE — 3044F PR MOST RECENT HEMOGLOBIN A1C LEVEL <7.0%: ICD-10-PCS | Mod: CPTII,S$GLB,, | Performed by: PHYSICIAN ASSISTANT

## 2023-08-10 PROCEDURE — 3008F PR BODY MASS INDEX (BMI) DOCUMENTED: ICD-10-PCS | Mod: CPTII,S$GLB,, | Performed by: PHYSICIAN ASSISTANT

## 2023-08-10 PROCEDURE — 1160F PR REVIEW ALL MEDS BY PRESCRIBER/CLIN PHARMACIST DOCUMENTED: ICD-10-PCS | Mod: CPTII,S$GLB,, | Performed by: PHYSICIAN ASSISTANT

## 2023-08-10 PROCEDURE — 4010F ACE/ARB THERAPY RXD/TAKEN: CPT | Mod: CPTII,S$GLB,, | Performed by: PHYSICIAN ASSISTANT

## 2023-08-10 PROCEDURE — 3066F NEPHROPATHY DOC TX: CPT | Mod: CPTII,S$GLB,, | Performed by: PHYSICIAN ASSISTANT

## 2023-08-10 PROCEDURE — 1160F RVW MEDS BY RX/DR IN RCRD: CPT | Mod: CPTII,S$GLB,, | Performed by: PHYSICIAN ASSISTANT

## 2023-08-10 PROCEDURE — 3061F NEG MICROALBUMINURIA REV: CPT | Mod: CPTII,S$GLB,, | Performed by: PHYSICIAN ASSISTANT

## 2023-08-10 PROCEDURE — 3075F PR MOST RECENT SYSTOLIC BLOOD PRESS GE 130-139MM HG: ICD-10-PCS | Mod: CPTII,S$GLB,, | Performed by: PHYSICIAN ASSISTANT

## 2023-08-10 PROCEDURE — 4010F PR ACE/ARB THEARPY RXD/TAKEN: ICD-10-PCS | Mod: CPTII,S$GLB,, | Performed by: PHYSICIAN ASSISTANT

## 2023-08-10 PROCEDURE — 3066F PR DOCUMENTATION OF TREATMENT FOR NEPHROPATHY: ICD-10-PCS | Mod: CPTII,S$GLB,, | Performed by: PHYSICIAN ASSISTANT

## 2023-08-10 PROCEDURE — 99213 OFFICE O/P EST LOW 20 MIN: CPT | Mod: S$GLB,,, | Performed by: PHYSICIAN ASSISTANT

## 2023-08-10 PROCEDURE — 3008F BODY MASS INDEX DOCD: CPT | Mod: CPTII,S$GLB,, | Performed by: PHYSICIAN ASSISTANT

## 2023-08-10 PROCEDURE — 99999 PR PBB SHADOW E&M-EST. PATIENT-LVL III: ICD-10-PCS | Mod: PBBFAC,,, | Performed by: PHYSICIAN ASSISTANT

## 2023-08-10 PROCEDURE — 3044F HG A1C LEVEL LT 7.0%: CPT | Mod: CPTII,S$GLB,, | Performed by: PHYSICIAN ASSISTANT

## 2023-08-10 NOTE — PROGRESS NOTES
Referred by Malini Sullivan MD     NEW PATIENT VISIT    Amanda Ponce  is a pleasant 57 y.o. female  with PMH significant for HTN, HLD, DM II, Vit D def, BMI 40+, CARLOS who presents for CARLOS management.        States she completed sleep study with PCP and was advised she had sleep apnea and was referred to sleep medicine to discuss results and treatment recommendations/options.      SLEEP SCHEDULE   Environment    Bed Time 10PM   Sleep Latency Not long at all   Arousals 2   Nocturia 2   Back to sleep Not long at all   Wake time 6:30AM work days   Naps None (no time)   Work          Past Medical History:   Diagnosis Date    DM (diabetes mellitus)     HTN (hypertension)     Obesity, Class II, BMI 35-39.9, with comorbidity 12/19/2016    Vitamin D deficiency disease 03/24/2015     Patient Active Problem List   Diagnosis    Hyperlipidemia associated with type 2 diabetes mellitus    Vitamin D deficiency disease    Hypertension associated with diabetes    Type 2 diabetes mellitus with microalbuminuria, without long-term current use of insulin    Morbid obesity with BMI of 40.0-44.9, adult    Limited range of motion (ROM) of shoulder    Sleep apnea: severe per HST 7/23       Current Outpatient Medications:     amLODIPine (NORVASC) 10 MG tablet, Take 1 tablet (10 mg total) by mouth once daily., Disp: 90 tablet, Rfl: 3    atorvastatin (LIPITOR) 20 MG tablet, Take 1 tablet (20 mg total) by mouth once daily., Disp: 90 tablet, Rfl: 3    blood sugar diagnostic (ONE TOUCH ULTRA TEST) Strp, 1 each by Misc.(Non-Drug; Combo Route) route daily as needed., Disp: 35 strip, Rfl: 6    cholecalciferol, vitamin D3, (VITAMIN D3) 25 mcg (1,000 unit) capsule, Take 2 capsules (2,000 Units total) by mouth once daily., Disp: 60 capsule, Rfl: 12    lancets Misc, 1 each by Misc.(Non-Drug; Combo Route) route once daily., Disp: 100 each, Rfl: 3    losartan (COZAAR) 100 MG tablet, Take 1 tablet (100 mg total) by mouth once daily., Disp: 90 tablet,  "Rfl: 3    meloxicam (MOBIC) 15 MG tablet, Take 1 tablet (15 mg total) by mouth daily as needed for Pain., Disp: 30 tablet, Rfl: 0    semaglutide (RYBELSUS) 7 mg tablet, Take 1 tablet (7 mg total) by mouth once daily., Disp: 30 tablet, Rfl: 0    aspirin (ECOTRIN) 81 MG EC tablet, Take 1 tablet (81 mg total) by mouth once daily., Disp: 30 tablet, Rfl: 12       Vitals:    08/10/23 1340   BP: 131/84   BP Location: Left arm   Patient Position: Sitting   BP Method: Medium (Automatic)   Pulse: 90   Weight: 90.7 kg (200 lb)   Height: 4' 11" (1.499 m)     Physical Exam:    GEN:   Well-appearing  Psych:  Appropriate affect, demonstrates insight  SKIN:  No rash on the face or bridge of the nose      LABS:   No results found for: "HGB", "CO2"    RECORDS REVIEWED PREVIOUSLY:    23 HST: AHI 51, RDI 61    ASSESSMENT    Ranger Sleepiness Scale:  Sitting and readin  Watching TV:    0  Passenger in a car x 1 hr:  0  Sitting quietly after lunch:  0  Lying down to rest in PM:  0  Sitting, inactive in public:  0  Sitting+ talking to someone:  0  Stopped in traffic:   0  Total    0/24    PROBLEM DESCRIPTION/ Sx on Presentation  STATUS    EVERE CARLOS   + snoring, + occasional choking arousals, no witnessed apneas  HST  AHI 51  New   Daytime Sx   Denies sleepiness when inactive   ESS 0/24 on intake  New   Insomnia   Trouble falling asleep: no issues  Maintenance:         wakes frequently, not difficult to return to sleep  Prior hypnotics:        Current hypnotics:     New   Nocturia   x 2 per sleep period  New   Other issues:     PLAN     -discussed HST results with patient  -discussed CPAP, inspire, MAD  -patient is okay with trialing CPAP  -recommended CPAP titration due to severity of CARLOS noted on HST  -CPAP titration ordered  -CPAP and supplies ordered (will adjust CPAP pressures pending results of titration study)  -discussed CARLOS and CPAP with patient in detail, including possible complications of untreated CARLOS like " heart attack/stroke  -advised on strict driving precautions; advised never to drive drowsy    Advised on plan of care. Answered all patient questions. Patient verbalized understanding and voiced agreement with plan of care.       RTC if dx of CARLOS made and CPAP ordered, will need follow up 31-90 days after receiving machine for compliance        The patient was given open opportunity to ask questions and/or express concerns about treatment plan. All questions/concerns were discussed.     Two patient identifiers used prior to evaluation.

## 2023-08-10 NOTE — TELEPHONE ENCOUNTER
----- Message from Aida Galeano sent at 8/10/2023  9:42 AM CDT -----  Contact: [employer]/Russ/[phone] reference key [id number]  Pharmacy is calling to clarify an RX.  RX name:  semaglutide (RYBELSUS) 7 mg tablet    Comments:  need to speak with someone about trouble getting the PA submitted  for this medication on their website

## 2023-08-14 NOTE — PLAN OF CARE
[facility]   Physical Therapy Initial Evaluation      Name: Amanda Ponce  Clinic Number: [id number]    Therapy Diagnosis:   Encounter Diagnosis   Name Primary?    Bilateral back pain, unspecified back location, unspecified chronicity         Physician: Malini Sullivan MD    Physician Orders: PT Eval and Treat   Medical Diagnosis from Referral:  M54.9 (ICD-10-CM) - Bilateral back pain, unspecified back location, unspecified chronicity    Evaluation Date: 8/3/2023  Authorization Period Expiration: 12/31/2023  Plan of Care Expiration: 11/3/2023  Progress Note Due: 9/3/2023  Visit # / Visits authorized: 1/ 1   FOTO: 1/1      Time In: 1:45 PM  Time Out: 2:45 PM  Total Appointment Time (timed & untimed codes): 60 minutes    Precautions: Standard  Subjective     Date of onset: Chronic > 6 months    History of current condition - Amanda reports: worsening low back pain      Medical History:   Past Medical History:   Diagnosis Date    DM (diabetes mellitus)     HTN (hypertension)     Obesity, Class II, BMI 35-39.9, with comorbidity 12/19/2016    Vitamin D deficiency disease 03/24/2015       Surgical History:   Amanda Ponce  has a past surgical history that includes Hysterectomy; Cardiac surgery; and Colonoscopy (N/A, 10/19/2018).    Medications:   Amanda has a current medication list which includes the following prescription(s): amlodipine, aspirin, atorvastatin, blood sugar diagnostic, cholecalciferol (vitamin d3), lancets, losartan, meloxicam, and rybelsus.    Allergies:   Review of patient's allergies indicates:   Allergen Reactions    Ace inhibitors      Other reaction(s): cough        Imaging, bone scan films: 6/15/2023    FINDINGS:  There is slight grade 1 retrolisthesis of L1 on L2 through L3 on L4 with grade 1 anterolisthesis of L4 on L5 and grade 1 retrolisthesis of L5 on S1.  Scattered endplate degeneration most pronounced at L1/L2 with moderate height loss, endplate degeneration and  vacuum phenomena.  There is facet degenerative change lower lumbar levels.  Allowing for degenerative change the lumbar vertebral body heights and contours are within normal is without evidence for acute fracture.  Further evaluation as warranted clinically.       Prior Therapy: None  Social History:  lives with their family      Pain:  Current 6/10, worst 8/10, best 2/10   Location: bilateral back   Description: Grabbing, Tight, Deep, and Sharp  Aggravating Factors: Sitting, Standing, Walking, Flexing, Lifting, and Getting out of bed/chair  Easing Factors: relaxation and pain medication    Pts goals: Reduce low back irritation with daily and work activities.     Objective   Range of Motion/Strength:      Lumbar AROM   Flexion 25% limitation   Extension WFL   Right side bending 50% limitation   Left side bending 50% limitation   Right rotation 50% limitation   Left rotation 50% limitation                                                                                   Knee Right Left   AROM/PROM       flexion WFL WFL   extension WFL WFL         L/E MMT Right Left   Hip Flexion 3/5 3/5   Hip Extension 3-/5 3/5   Hip Abduction/PGM 3-/5 3/5   Supine Hip IR 3/5 3/5   Supine Hip ER 3/5 3/5   Knee Flexion 3/5 3/5   Knee Extension 3/5 3/5   Ankle DF 3/5 3/5   Ankle PF 3/5 3/5   Ankle Inversion 3/5 3/5   Ankle Eversion 3/5 3/5            Flexibility: Poor hamstring flexibility (90-90 hamstring test: 15 deg)        TUG:  10 seconds      30 second sit-to-stand test (without U/E support): 7    CMS Impairment/Limitation/Restriction for FOTO Low Back Survey    Therapist reviewed FOTO scores for Amanda Ponce on 8/3/2023.   FOTO documents entered into [employer] - see Media section.    Limitation Score: TBD%  Predicted Score:TBD%  Mod NELI:TBD       TREATMENT     Treatment Time In: 2:00  Treatment Time Out: 2:45 PM  Total Treatment time separate from Evaluation: 45 minutes    therapeutic exercises to develop strength for 30 minutes  "including  TA Contraction 5"x30  Brace Supine March x15  Hip Adduction Squeeze x15  Short arc quad x30 bilat   Straight leg raise x20 bilat   Sidelying hip abduction x20 bilat   Prone Hip extension x20 bilat  Lower Trunk Rotations x30  Thoracic Rotation x30 bilat  Bridges x30       manual therapy techniques: Joint mobilizations, Manual traction, and Soft tissue Mobilization were applied to the: Lumbar Spine and bilateral hips for 15 minutes, including:  Supine long axis hip distraction 30" x 5 bilat  STM to lumbar paraspinals         Home Exercises and Patient Education Provided    Education provided:   - Pt educated on POC  - Pt educated on HEP  - Pt educated on anatomy and physiology of current condition as it relates to signs and symptoms.     Written Home Exercises Provided: yes.  Exercises were reviewed and Amanda was able to demonstrate them prior to the end of the session.  Amanda demonstrated good  understanding of the education provided.     See EMR under Patient Instructions for exercises provided prior visit.    Assessment     Amanda is a 57 y.o. female referred to outpatient Physical Therapy with a medical diagnosis of bilateral low back pain. Patient presents with moderate active range of motion     Patient prognosis is Good.   Patient will benefit from skilled outpatient Physical Therapy to address the deficits stated above and in the chart below, provide patient /family education, and to maximize patientt's level of independence.     Plan of care discussed with patient: Yes  Patient's spiritual, cultural and educational needs considered and patient is agreeable to the plan of care and goals as stated below:     Anticipated Barriers for therapy: None    Medical Necessity is demonstrated by the following  History  Co-morbidities and personal factors that may impact the plan of care [x] LOW: no personal factors / co-morbidities  [] MODERATE: 1-2 personal factors / co-morbidities  [] HIGH: 3+ personal " "factors / co-morbidities    Moderate / High Support Documentation:   Co-morbidities affecting plan of care:     Personal Factors:   no deficits     Examination  Body Structures and Functions, activity limitations and participation restrictions that may impact the plan of care [x] LOW: addressing 1-2 elements  [] MODERATE: 3+ elements  [] HIGH: 4+ elements (please support below)    Moderate / High Support Documentation:      Clinical Presentation [x] LOW: stable  [] MODERATE: Evolving  [] HIGH: Unstable     Decision Making/ Complexity Score: low           Goals:   Short Term Goals (4 Weeks):  1. Pt will be compliant with HEP to supplement PT in decreasing pain with functional mobility.   2. Pt will perform sit to stand from 18" x 30 without pain or limitation to demonstrate increase in functional mobility.   3. Pt will report no pain during thoracolumbar ROM to promote functional mobility.   4. Pt will demonstrate ability to complete straight leg raise x 20 bilaterally with 3# weight to increase muscular endurance.      Long Term Goals (8 Weeks):   1. Pt will demonstrate ability to treadmill walk @ 3.0 mph or faster x 15 minutes without visible gait deviation or fatigue.   2. Pt will be able to complete sit to stand with 20# x 15 to demonstrate increase in work functional tasks.   3. Pt will report no pain during work shift promote returning to PLOF.    Plan     Plan of care Certification: 8/3/2023 to 11/3/2023.    Outpatient Physical Therapy 2 times weekly for 8 weeks to include the following interventions: Gait Training, Manual Therapy, Moist Heat/ Ice, Neuromuscular Re-ed, Patient Education, Therapeutic Activities, and Therapeutic Exercise.     Karley Parker, PT,DPT, DN  "

## 2023-08-18 ENCOUNTER — DOCUMENTATION ONLY (OUTPATIENT)
Dept: REHABILITATION | Facility: HOSPITAL | Age: 58
End: 2023-08-18

## 2023-08-18 NOTE — PROGRESS NOTES
[facility]                          Missed Therapy Appointment     Amanda Ponce  MRN: [id number]    Patient no show to therapy appointment on 8/18/2023 for 3:00pm.    Kavitha Zayas, YESSENIA  8/18/2023

## 2023-08-25 ENCOUNTER — CLINICAL SUPPORT (OUTPATIENT)
Dept: REHABILITATION | Facility: HOSPITAL | Age: 58
End: 2023-08-25
Payer: COMMERCIAL

## 2023-08-25 DIAGNOSIS — M54.9 BILATERAL BACK PAIN, UNSPECIFIED BACK LOCATION, UNSPECIFIED CHRONICITY: Primary | ICD-10-CM

## 2023-08-25 PROCEDURE — 97112 NEUROMUSCULAR REEDUCATION: CPT | Mod: PN

## 2023-08-25 PROCEDURE — 97110 THERAPEUTIC EXERCISES: CPT | Mod: PN

## 2023-08-25 PROCEDURE — 97140 MANUAL THERAPY 1/> REGIONS: CPT | Mod: PN

## 2023-08-25 NOTE — PROGRESS NOTES
[facility]   Physical Therapy Progress Note      Name: Amanda Ponce  [facility] Number: [id number]    Therapy Diagnosis:   Encounter Diagnosis   Name Primary?    Bilateral back pain, unspecified back location, unspecified chronicity      Physician: Malini Sullivan MD    Visit Date: 8/25/2023    Physician Orders: PT Eval and Treat   Medical Diagnosis from Referral:  M54.9 (ICD-10-CM) - Bilateral back pain, unspecified back location, unspecified chronicity     Evaluation Date: 8/3/2023  Authorization Period Expiration: 12/31/2023  Plan of Care Expiration: 11/3/2023  Progress Note Due: 9/24/2023  Visit # / Visits authorized: 1/ 1   FOTO: 1/1        Time In: 2:45 PM  Time Out: 3:45 PM  Total Appointment Time (timed & untimed codes): 55 minutes     Precautions: Standard    PTA Visit #: 0/5       Subjective   Pt reports: that she is feeling a little better since the evaluation. Was not able to do much of her HEP as she has had 4 funerals this week.   She was not compliant with home exercise program.  Response to previous treatment: good -short term  Functional change: none yet; still pain after chores    Pain: minimal currently, but stiff through low back  Location: right>left lumbar     Objective      Objective Measures updated at progress report unless specified.   AROM eval 8/25/23   Flexion 25% limitation WFL   Extension WFL 50% limitation   Right side bending 50% limitation WNL   Left side bending 50% limitation WNL   Right rotation 50% limitation 25% limitation   Left rotation 50% limitation 25% limitation         L/E MMT Right - eval 8/25/23 Left - eval 8/25/23   Hip Flexion 3/5 4- 3/5 4-   Hip Extension 3-/5 Able to bridge 75% 3/5 Able to bridge 75%   Hip Abduction/PGM 3-/5 NT 3/5 NT   Supine Hip IR 3/5 Seated 4 3/5 Seated 4   Supine Hip ER 3/5 Seated 4- 3/5 Seated 4-   Knee Flexion 3/5 4+ 3/5 4+   Knee Extension 3/5 5 3/5 5   Ankle DF 3/5 5 3/5 5   Ankle PF 3/5 4+ 3/5 4+   Ankle  "Inversion 3/5 4+ 3/5 4+   Ankle Eversion 3/5 4+ 3/5 4+         Treatment     Amanda received the treatments listed below:      therapeutic exercises to develop strength for 25 minutes including  PPUs, 3x10  -cues for segmental extension  Lower Trunk Rotations 3x10  Short arc quad x30 bilat   Straight leg raise x20 bilat   Bridges x30  Sidelying clamshells, 2x10 each  Prone Hip extension 2x10 each  Standing child's pose, L/R/center, 2x20" each    NP at this date:  Hip Adduction Squeeze x15  Sidelying hip abduction x20 bilat   Thoracic Rotation x30 bilat      manual therapy techniques: Joint mobilizations, Manual traction, and Soft tissue Mobilization were applied to the: Lumbar Spine and bilateral hips for 15 minutes, including:  STM to R>L thoracolumbar paraspinals  Grades I-III Central thoracolumbar PAs   PROM elongation of lumbar myofascia with pressure at lower ribs and pelvis    NP at this date:  Supine long axis hip distraction 30" x 5 bilat    neuromuscular re-education activities for 15 minutes. The following activities were included:  PPTs, 3x10 5"  Transverse abdominus activations in hooklying, 3x10 x5"  -verbal and tactile cues for palpation and performance of isolated transverse abdominus contraction vs. rectus or oblique abdominal contraction  TrA activation + Mini marches, 3x5 each LE  -cues for maintained transverse abdominus activations, especially at transition L<>R    Body mechanics:  Educated on using dowel or straight edge on back for lifting via hip-hinge vs. lumbar flexion for bending activities.    Patient Education and Home Exercises     Education provided:   - see above    Written Home Exercises Provided: yes. Exercises were reviewed and Amanda was able to demonstrate them prior to the end of the session.  Amanda demonstrated good  understanding of the education provided. See EMR under Patient Instructions for exercises provided during therapy sessions    Assessment   Amanda is a 57 y.o. female " "referred to outpatient Physical Therapy with a medical diagnosis of bilateral low back pain. Patient tolerated the above manual therapy and therex/NMR progression well, noting improved mobility and better understanding of HEP following. Should benefit from continued skilled PT to address remaining deficits and meet the goals from evaluation.    Amanda Is progressing well towards her goals.   Pt prognosis is Good.     Pt will continue to benefit from skilled outpatient physical therapy to address the deficits listed in the problem list box on initial evaluation, provide pt/family education and to maximize pt's level of independence in the home and community environment.     Pt's spiritual, cultural and educational needs considered and pt agreeable to plan of care and goals.     Anticipated barriers to physical therapy: none noted     Goals: Short Term Goals (4 Weeks):  1. Pt will be compliant with HEP to supplement PT in decreasing pain with functional mobility. Met, but progressing  2. Pt will perform sit to stand from 18" x 30 without pain or limitation to demonstrate increase in functional mobility. In progress  3. Pt will report no pain during thoracolumbar ROM to promote functional mobility. In progress  4. Pt will demonstrate ability to complete straight leg raise x 20 bilaterally with 3# weight to increase muscular endurance. In progress     Long Term Goals (8 Weeks):   1. Pt will demonstrate ability to treadmill walk @ 3.0 mph or faster x 15 minutes without visible gait deviation or fatigue. In progress, NT at this date  2. Pt will be able to complete sit to stand with 20# x 15 to demonstrate increase in work functional tasks. In progress, NT at this date  3. Pt will report no pain during work shift promote returning to PLOF. In progress     Plan      Plan of care Certification: 8/3/2023 to 11/3/2023.     Outpatient Physical Therapy 2 times weekly for 8 weeks to include the following interventions: Gait " Training, Manual Therapy, Moist Heat/ Ice, Neuromuscular Re-ed, Patient Education, Therapeutic Activities, and Therapeutic Exercise.     MAGALIE DOTSON, PT

## 2023-09-08 ENCOUNTER — CLINICAL SUPPORT (OUTPATIENT)
Dept: REHABILITATION | Facility: HOSPITAL | Age: 58
End: 2023-09-08
Payer: COMMERCIAL

## 2023-09-08 DIAGNOSIS — M54.9 BILATERAL BACK PAIN, UNSPECIFIED BACK LOCATION, UNSPECIFIED CHRONICITY: Primary | ICD-10-CM

## 2023-09-08 PROCEDURE — 97112 NEUROMUSCULAR REEDUCATION: CPT | Mod: PN,CQ

## 2023-09-08 PROCEDURE — 97110 THERAPEUTIC EXERCISES: CPT | Mod: PN,CQ

## 2023-09-08 NOTE — PROGRESS NOTES
"[facility]   Physical Therapy Treatment Note      Name: Amanda Ponce  Clinic Number: [id number]    Therapy Diagnosis:   Encounter Diagnosis   Name Primary?    Bilateral back pain, unspecified back location, unspecified chronicity      Physician: Malini Sullivan MD    Visit Date: 9/8/2023    Physician Orders: PT Eval and Treat   Medical Diagnosis from Referral:  M54.9 (ICD-10-CM) - Bilateral back pain, unspecified back location, unspecified chronicity     Evaluation Date: 8/3/2023  Authorization Period Expiration: 12/31/2023  Plan of Care Expiration: 11/3/2023  Progress Note Due: 9/24/2023  Visit # / Visits authorized: 2/15   FOTO: 1/1        Time In: 1500  Time Out: 1545  Total Appointment Time (timed & untimed codes): 45 minutes     Precautions: Standard    PTA Visit #: 1/5     Subjective   Pt reports: her back is feeling ok. She does her exercises every other day. She has been dealing with this back pain for a while now. She also has foot pain due to bone spurs in the foot.     She was not compliant with home exercise program.  Response to previous treatment: good -short term  Functional change: none yet; still pain after chores    Pain: minimal currently, but stiff through low back  Location: right>left lumbar     Objective    None taken today.     Treatment     Amanda received the treatments listed below:      therapeutic exercises to develop strength for 30 minutes including    Lower Trunk Rotations 3x10  Short arc quad x30 bilat   Straight leg raise x20 bilat   Bridges x30  Sidelying clamshells, 2x10 each with RTB   Step ups on 6" while standing on Airex foam pad, x20  Alternate step taps on 6" while standing on Airex foam pad, x20  Standing hip ABD bilateral, x20  Standing hip extension bilateral, x20   Sit to stand from 18" box while holding onto 10lb kettlebell, x20   Nu-step 8 minutes at level 2      NP at this date:  PPUs, 3x10  -cues for segmental extension   Hip Adduction " "Squeeze x15  Sidelying hip abduction x20 bilat   Thoracic Rotation x30 bilat  Prone Hip extension 2x10 each   Standing child's pose, L/R/center, 2x20" each       manual therapy techniques: Joint mobilizations, Manual traction, and Soft tissue Mobilization were applied to the: Lumbar Spine and bilateral hips for 00 minutes, including:    NP at this date:  STM to R>L thoracolumbar paraspinals  Grades I-III Central thoracolumbar PAs   PROM elongation of lumbar myofascia with pressure at lower ribs and pelvis  Supine long axis hip distraction 30" x 5 bilat    neuromuscular re-education activities for 15 minutes. The following activities were included:  PPTs, 3x10 5"  Transverse abdominus activations in hooklying, 2x10 x5"  -verbal and tactile cues for palpation and performance of isolated transverse abdominus contraction vs. rectus or oblique abdominal contraction  TrA activation + Mini marches, 3x5 each LE  -cues for maintained transverse abdominus activations, especially at transition L<>R    Body mechanics:  Educated on using dowel or straight edge on back for lifting via hip-hinge vs. lumbar flexion for bending activities.    Patient Education and Home Exercises     Education provided:   - see above    Written Home Exercises Provided: yes. Exercises were reviewed and Amanda was able to demonstrate them prior to the end of the session.  Amanda demonstrated good  understanding of the education provided. See EMR under Patient Instructions for exercises provided during therapy sessions    Assessment   Introduced standing activities and nu-step for endurance and strengthening which patient appears to be tolerating well. No complaints of back pain during and after therapy session. Cues for glutes activation in standing hip extension. Will progress patient as tolerated and work towards her treatment goals.     Amanda Is progressing well towards her goals.   Pt prognosis is Good.     Pt will continue to benefit from skilled " "outpatient physical therapy to address the deficits listed in the problem list box on initial evaluation, provide pt/family education and to maximize pt's level of independence in the home and community environment.     Pt's spiritual, cultural and educational needs considered and pt agreeable to plan of care and goals.     Anticipated barriers to physical therapy: none noted     Goals: Short Term Goals (4 Weeks):  1. Pt will be compliant with HEP to supplement PT in decreasing pain with functional mobility. Met, but progressing  2. Pt will perform sit to stand from 18" x 30 without pain or limitation to demonstrate increase in functional mobility. In progress  3. Pt will report no pain during thoracolumbar ROM to promote functional mobility. In progress  4. Pt will demonstrate ability to complete straight leg raise x 20 bilaterally with 3# weight to increase muscular endurance. In progress     Long Term Goals (8 Weeks):   1. Pt will demonstrate ability to treadmill walk @ 3.0 mph or faster x 15 minutes without visible gait deviation or fatigue. In progress, NT at this date  2. Pt will be able to complete sit to stand with 20# x 15 to demonstrate increase in work functional tasks. In progress, NT at this date  3. Pt will report no pain during work shift promote returning to PLOF. In progress     Plan      Plan of care Certification: 8/3/2023 to 11/3/2023.     Outpatient Physical Therapy 2 times weekly for 8 weeks to include the following interventions: Gait Training, Manual Therapy, Moist Heat/ Ice, Neuromuscular Re-ed, Patient Education, Therapeutic Activities, and Therapeutic Exercise.     Kavitha Zayas PTA     "

## 2023-09-25 DIAGNOSIS — E78.2 MIXED HYPERLIPIDEMIA: ICD-10-CM

## 2023-09-25 NOTE — TELEPHONE ENCOUNTER
----- Message from Nithya Alvares sent at 9/25/2023  8:24 AM CDT -----  Contact: [phone]  Requesting an RX refill or new RX.  Is this a refill or new RX: refill  RX name and strength   atorvastatin (LIPITOR) 20 MG tablet 90 tablet   Is this a 30 day or 90 day RX: 90  Pharmacy name and phone # :  [facility] [place] - [address]   Phone:  [phone]  Fax:  [phone]        The doctors have asked that we provide their patients with the following 2 reminders -- prescription refills can take up to 72 hours, and a friendly reminder that in the future you can use your [facility] account to request refills: yes

## 2023-09-25 NOTE — TELEPHONE ENCOUNTER
Care Due:                  Date            Visit Type   Department     Provider  --------------------------------------------------------------------------------                                EP -                              PRIMARY      [facility] INTER[facility]  Last Visit: 06-      CARE ([facility])   MEDICINE       Malini Sullivan  Next Visit: None Scheduled  None         None Found                                                            Last  Test          Frequency    Reason                     Performed    Due Date  --------------------------------------------------------------------------------    CMP.........  12 months..  atorvastatin, losartan...  11- 11-    HBA1C.......  6 months...  semaglutide..............  06- 12-    Lipid Panel.  12 months..  atorvastatin.............  07- 06-    [facility] Embedded Care Due Messages. Reference number: [id number].   9/25/2023 8:35:55 AM CDT

## 2023-09-25 NOTE — TELEPHONE ENCOUNTER
Refill Routing Note     Refill Routing Note   Medication(s) are not appropriate for processing by [facility] for the following reason(s):      Required labs outdated    [facility] action(s):  Defer Care Due:  Labs due            Appointments  past 12m or future 3m with PCP    Date Provider   Last Visit   6/15/2023 Malini Sullivan MD   Next Visit   Visit date not found Malini Sullivan MD   ED visits in past 90 days: 0        Note composed:6:16 PM 09/25/2023

## 2023-09-26 RX ORDER — ATORVASTATIN CALCIUM 20 MG/1
20 TABLET, FILM COATED ORAL DAILY
Qty: 90 TABLET | Refills: 3 | Status: SHIPPED | OUTPATIENT
Start: 2023-09-26

## 2024-03-09 ENCOUNTER — PATIENT MESSAGE (OUTPATIENT)
Dept: ADMINISTRATIVE | Facility: HOSPITAL | Age: 59
End: 2024-03-09
Payer: COMMERCIAL

## 2024-03-27 DIAGNOSIS — E11.9 TYPE 2 DIABETES MELLITUS WITHOUT COMPLICATION, UNSPECIFIED WHETHER LONG TERM INSULIN USE: ICD-10-CM

## 2024-04-06 ENCOUNTER — TELEPHONE (OUTPATIENT)
Dept: INTERNAL MEDICINE | Facility: CLINIC | Age: 59
End: 2024-04-06
Payer: COMMERCIAL

## 2024-04-06 DIAGNOSIS — I10 ESSENTIAL HYPERTENSION: ICD-10-CM

## 2024-04-06 NOTE — TELEPHONE ENCOUNTER
Care Due:                  Date            Visit Type   Department     Provider  --------------------------------------------------------------------------------                                EP -                              PRIMARY      [facility] INTER[facility]  Last Visit: 06-      CARE ([facility])   MEDICINE       Malini Sullivan  Next Visit: None Scheduled  None         None Found                                                            Last  Test          Frequency    Reason                     Performed    Due Date  --------------------------------------------------------------------------------    Office Visit  12 months..  cholecalciferol,.........  06-   06-    CMP.........  12 months..  atorvastatin,              11- 11-                             cholecalciferol,,                             losartan.................    HBA1C.......  6 months...  semaglutide..............  06- 12-    Lipid Panel.  12 months..  atorvastatin.............  07- 06-    Vitamin D...  12 months..  cholecalciferol,.........  11- 11-    [facility] Embedded Care Due Messages. Reference number: [id number].   4/06/2024 2:44:32 PM CDT

## 2024-04-07 NOTE — TELEPHONE ENCOUNTER
Refill Routing Note   Medication(s) are not appropriate for processing by [facility] for the following reason(s):      Required labs outdated    [facility] action(s):  Defer Care Due:  Appointment due  Labs due            Appointments  past 12m or future 3m with PCP    Date Provider   Last Visit   6/15/2023 Malini Sullivan MD   Next Visit   Visit date not found Malini Sullivan MD   ED visits in past 90 days: 0        Note composed:10:31 AM 04/07/2024

## 2024-04-08 RX ORDER — LOSARTAN POTASSIUM 100 MG/1
100 TABLET ORAL
Qty: 90 TABLET | Refills: 0 | Status: SHIPPED | OUTPATIENT
Start: 2024-04-08

## 2024-04-08 NOTE — TELEPHONE ENCOUNTER
Please contact her.  She is overdue for blood work.  She needs to do this right away.  Orders are in.  She needs to schedule her eye exam    She should see me around June or July but she needs to do labs ASAP, thank you

## 2024-04-13 ENCOUNTER — LAB VISIT (OUTPATIENT)
Dept: LAB | Facility: HOSPITAL | Age: 59
End: 2024-04-13
Attending: INTERNAL MEDICINE
Payer: COMMERCIAL

## 2024-04-13 DIAGNOSIS — Z00.00 ANNUAL PHYSICAL EXAM: ICD-10-CM

## 2024-04-13 LAB
ALBUMIN SERPL BCP-MCNC: 3.6 G/DL (ref 3.5–5.2)
ALP SERPL-CCNC: 101 U/L (ref 55–135)
ALT SERPL W/O P-5'-P-CCNC: 25 U/L (ref 10–44)
ANION GAP SERPL CALC-SCNC: 11 MMOL/L (ref 8–16)
AST SERPL-CCNC: 19 U/L (ref 10–40)
BASOPHILS # BLD AUTO: 0.01 K/UL (ref 0–0.2)
BASOPHILS NFR BLD: 0.1 % (ref 0–1.9)
BILIRUB SERPL-MCNC: 0.4 MG/DL (ref 0.1–1)
BUN SERPL-MCNC: 16 MG/DL (ref 6–20)
CALCIUM SERPL-MCNC: 9.9 MG/DL (ref 8.7–10.5)
CHLORIDE SERPL-SCNC: 105 MMOL/L (ref 95–110)
CHOLEST SERPL-MCNC: 172 MG/DL (ref 120–199)
CHOLEST/HDLC SERPL: 3.1 {RATIO} (ref 2–5)
CO2 SERPL-SCNC: 27 MMOL/L (ref 23–29)
CREAT SERPL-MCNC: 0.8 MG/DL (ref 0.5–1.4)
DIFFERENTIAL METHOD BLD: ABNORMAL
EOSINOPHIL # BLD AUTO: 0.3 K/UL (ref 0–0.5)
EOSINOPHIL NFR BLD: 4.1 % (ref 0–8)
ERYTHROCYTE [DISTWIDTH] IN BLOOD BY AUTOMATED COUNT: 15.2 % (ref 11.5–14.5)
EST. GFR  (NO RACE VARIABLE): >60 ML/MIN/1.73 M^2
ESTIMATED AVG GLUCOSE: 140 MG/DL (ref 68–131)
GLUCOSE SERPL-MCNC: 96 MG/DL (ref 70–110)
HBA1C MFR BLD: 6.5 % (ref 4–5.6)
HCT VFR BLD AUTO: 40.8 % (ref 37–48.5)
HDLC SERPL-MCNC: 55 MG/DL (ref 40–75)
HDLC SERPL: 32 % (ref 20–50)
HGB BLD-MCNC: 12.6 G/DL (ref 12–16)
IMM GRANULOCYTES # BLD AUTO: 0.03 K/UL (ref 0–0.04)
IMM GRANULOCYTES NFR BLD AUTO: 0.4 % (ref 0–0.5)
LDLC SERPL CALC-MCNC: 99.4 MG/DL (ref 63–159)
LYMPHOCYTES # BLD AUTO: 2.2 K/UL (ref 1–4.8)
LYMPHOCYTES NFR BLD: 30.6 % (ref 18–48)
MCH RBC QN AUTO: 28.3 PG (ref 27–31)
MCHC RBC AUTO-ENTMCNC: 30.9 G/DL (ref 32–36)
MCV RBC AUTO: 92 FL (ref 82–98)
MONOCYTES # BLD AUTO: 0.7 K/UL (ref 0.3–1)
MONOCYTES NFR BLD: 9.4 % (ref 4–15)
NEUTROPHILS # BLD AUTO: 3.9 K/UL (ref 1.8–7.7)
NEUTROPHILS NFR BLD: 55.4 % (ref 38–73)
NONHDLC SERPL-MCNC: 117 MG/DL
NRBC BLD-RTO: 0 /100 WBC
PLATELET # BLD AUTO: 340 K/UL (ref 150–450)
PMV BLD AUTO: 10.8 FL (ref 9.2–12.9)
POTASSIUM SERPL-SCNC: 4.4 MMOL/L (ref 3.5–5.1)
PROT SERPL-MCNC: 8.6 G/DL (ref 6–8.4)
RBC # BLD AUTO: 4.46 M/UL (ref 4–5.4)
SODIUM SERPL-SCNC: 143 MMOL/L (ref 136–145)
TRIGL SERPL-MCNC: 88 MG/DL (ref 30–150)
TSH SERPL DL<=0.005 MIU/L-ACNC: 1.61 UIU/ML (ref 0.4–4)
VIT B12 SERPL-MCNC: 463 PG/ML (ref 210–950)
WBC # BLD AUTO: 7.03 K/UL (ref 3.9–12.7)

## 2024-04-13 PROCEDURE — 82607 VITAMIN B-12: CPT | Performed by: INTERNAL MEDICINE

## 2024-04-13 PROCEDURE — 80061 LIPID PANEL: CPT | Performed by: INTERNAL MEDICINE

## 2024-04-13 PROCEDURE — 36415 COLL VENOUS BLD VENIPUNCTURE: CPT | Mod: PO | Performed by: INTERNAL MEDICINE

## 2024-04-13 PROCEDURE — 83036 HEMOGLOBIN GLYCOSYLATED A1C: CPT | Performed by: INTERNAL MEDICINE

## 2024-04-13 PROCEDURE — 85025 COMPLETE CBC W/AUTO DIFF WBC: CPT | Performed by: INTERNAL MEDICINE

## 2024-04-13 PROCEDURE — 80053 COMPREHEN METABOLIC PANEL: CPT | Performed by: INTERNAL MEDICINE

## 2024-04-13 PROCEDURE — 84443 ASSAY THYROID STIM HORMONE: CPT | Performed by: INTERNAL MEDICINE

## 2024-04-29 LAB
LEFT EYE DM RETINOPATHY: NEGATIVE
RIGHT EYE DM RETINOPATHY: NEGATIVE

## 2024-05-01 ENCOUNTER — OFFICE VISIT (OUTPATIENT)
Dept: URGENT CARE | Facility: CLINIC | Age: 59
End: 2024-05-01
Payer: COMMERCIAL

## 2024-05-01 VITALS
HEIGHT: 59 IN | HEART RATE: 127 BPM | TEMPERATURE: 102 F | SYSTOLIC BLOOD PRESSURE: 125 MMHG | RESPIRATION RATE: 18 BRPM | BODY MASS INDEX: 40.27 KG/M2 | OXYGEN SATURATION: 96 % | DIASTOLIC BLOOD PRESSURE: 74 MMHG | WEIGHT: 199.75 LBS

## 2024-05-01 DIAGNOSIS — U07.1 COVID-19 VIRUS DETECTED: ICD-10-CM

## 2024-05-01 DIAGNOSIS — U07.1 COVID: Primary | ICD-10-CM

## 2024-05-01 DIAGNOSIS — R50.9 FEVER, UNSPECIFIED FEVER CAUSE: ICD-10-CM

## 2024-05-01 DIAGNOSIS — R52 BODY ACHES: ICD-10-CM

## 2024-05-01 LAB
CTP QC/QA: YES
CTP QC/QA: YES
POC MOLECULAR INFLUENZA A AGN: NEGATIVE
POC MOLECULAR INFLUENZA B AGN: NEGATIVE
SARS-COV-2 AG RESP QL IA.RAPID: POSITIVE

## 2024-05-01 PROCEDURE — 99214 OFFICE O/P EST MOD 30 MIN: CPT | Mod: S$GLB,,,

## 2024-05-01 PROCEDURE — 87502 INFLUENZA DNA AMP PROBE: CPT | Mod: QW,S$GLB,,

## 2024-05-01 PROCEDURE — 87811 SARS-COV-2 COVID19 W/OPTIC: CPT | Mod: QW,S$GLB,,

## 2024-05-01 RX ORDER — PROMETHAZINE HYDROCHLORIDE AND DEXTROMETHORPHAN HYDROBROMIDE 6.25; 15 MG/5ML; MG/5ML
5 SYRUP ORAL EVERY 6 HOURS PRN
Qty: 150 ML | Refills: 0 | Status: SHIPPED | OUTPATIENT
Start: 2024-05-01 | End: 2024-05-22

## 2024-05-01 RX ORDER — BENZONATATE 200 MG/1
200 CAPSULE ORAL 3 TIMES DAILY PRN
Qty: 30 CAPSULE | Refills: 0 | Status: SHIPPED | OUTPATIENT
Start: 2024-05-01 | End: 2024-05-22

## 2024-05-01 RX ORDER — ACETAMINOPHEN 500 MG
1000 TABLET ORAL
Status: COMPLETED | OUTPATIENT
Start: 2024-05-01 | End: 2024-05-01

## 2024-05-01 RX ORDER — CETIRIZINE HYDROCHLORIDE 10 MG/1
10 TABLET ORAL DAILY
Qty: 30 TABLET | Refills: 0 | Status: SHIPPED | OUTPATIENT
Start: 2024-05-01 | End: 2024-05-22

## 2024-05-01 RX ORDER — FLUTICASONE PROPIONATE 50 MCG
2 SPRAY, SUSPENSION (ML) NASAL DAILY
Qty: 16 G | Refills: 0 | Status: SHIPPED | OUTPATIENT
Start: 2024-05-01

## 2024-05-01 RX ADMIN — Medication 1000 MG: at 08:05

## 2024-05-01 NOTE — PROGRESS NOTES
"Subjective:      Patient ID: Amanda Ponce is a 58 y.o. female.    Vitals:  height is 4' 11" (1.499 m) and weight is 90.6 kg (199 lb 11.8 oz). Her temperature is 101.5 °F (38.6 °C) (abnormal). Her blood pressure is 125/74 and her pulse is 127 (abnormal). Her respiration is 18 and oxygen saturation is 96%.     Chief Complaint: Generalized Body Aches    Patient presents with body aches.  Associated symptoms include fever, headache, rhinorrhea, cough.  Onset was yesterday.  She is taken over-the-counter medications such as dose of an allergy tablets with no improvement of symptoms.  She denies chills, chest pain, SOB, nausea, vomiting, abdominal pain.      Sore Throat   This is a new problem. The current episode started yesterday. The problem has been unchanged. Neither side of throat is experiencing more pain than the other. There has been no fever. The fever has been present for Less than 1 day. The pain is at a severity of 5/10. The pain is mild. Associated symptoms include congestion, coughing and headaches. Pertinent negatives include no abdominal pain, diarrhea, drooling, ear discharge, ear pain, hoarse voice, plugged ear sensation, neck pain, shortness of breath, stridor, swollen glands, trouble swallowing or vomiting. She has had no exposure to strep or mono. Treatments tried: delsym. The treatment provided no relief.     Constitution: Positive for fever. Negative for chills.   HENT:  Positive for congestion and sore throat. Negative for ear pain, ear discharge, drooling and trouble swallowing.    Neck: Negative for neck pain.   Cardiovascular:  Negative for chest pain and sob on exertion.   Respiratory:  Positive for cough. Negative for sputum production, shortness of breath and stridor.    Gastrointestinal:  Negative for abdominal pain, nausea, vomiting and diarrhea.   Musculoskeletal:  Positive for muscle ache.   Skin:  Negative for rash.   Neurological:  Positive for headaches.      Objective:     Physical " Exam   Constitutional:  Non-toxic appearance. She does not appear ill. No distress.      Comments:Patient is in no acute distress, patient is non-toxic appearing, patient is ox3, patient is answering question appropriately.     HENT:   Head: Normocephalic.   Ears:   Right Ear: Tympanic membrane, external ear and ear canal normal. no impacted cerumen  Left Ear: Tympanic membrane, external ear and ear canal normal. no impacted cerumen  Nose: Congestion present. No rhinorrhea.   Mouth/Throat: Posterior oropharyngeal erythema present. No oropharyngeal exudate. Oropharynx is clear.      Comments: No drooling, no tripoding. Patient is able to handle secretions. Patient appears to be in no acute respiratory distress. Airway is intact. Patient is able to talk in complete sentences without discomfort.  No drooling, no tripoding. Patient is able to handle secretions. Patient appears to be in no acute respiratory distress. Airway is intact. Patient is able to talk in complete sentences without discomfort.      Comments: No drooling, no tripoding. Patient is able to handle secretions. Patient appears to be in no acute respiratory distress. Airway is intact. Patient is able to talk in complete sentences without discomfort.  Cardiovascular: Regular rhythm and normal heart sounds. Tachycardia present.   No murmur heard.Exam reveals no gallop and no friction rub.   Pulmonary/Chest: Effort normal. No stridor. No respiratory distress. She has no wheezes. She has no rhonchi. She has no rales. She exhibits no tenderness.         Comments: Patient is in no respiratory distress. Breath sounds even, unlabored, and clear to auscultation bilaterally. No accessory muscle usage. Patient able to speak in complete sentences with ease.    Abdominal: Normal appearance.   Lymphadenopathy:     She has cervical adenopathy.   Neurological: She is alert.   Skin: Skin is not diaphoretic.   Nursing note and vitals reviewed.    Results for orders placed  or performed in visit on 05/01/24   SARS Coronavirus 2 Antigen, POCT Manual Read   Result Value Ref Range    SARS Coronavirus 2 Antigen Positive (A) Negative     Acceptable Yes    POCT Influenza A/B MOLECULAR   Result Value Ref Range    POC Molecular Influenza A Ag Negative Negative    POC Molecular Influenza B Ag Negative Negative     Acceptable Yes      Assessment:     1. COVID    2. Body aches    3. Fever, unspecified fever cause      Plan:   Previous notes reviewed.  Vital signs reviewed.  Labs ordered. Labs reviewed.  Discussed COVID with systemic symptoms, home care, tx options, and given follow up precautions.  Patient was briefed on my thought process and diagnosis.   Patient involved with the treatment plan and agreed to the plan.  Patient informed on warning signs, patient understood warning signs and to go to urgent care or ER if warning signs appear.  Please excuse grammatical/spelling errors appreciated throughout this visit encounter for a remote dictation device was used during this encounter    Patient Instructions   You may return to work/school if you are fever (100.4 or greater) free for 24 hours without the use of fever reducing medications AND noticing improvement of symptoms.    Please take PAXLOVID for COVID. Please STOP taking ATORVASTATIN for 10 days while taking this medication, for this medication can cause serious side effects.  Please take CETIRIZINE for allergy relief.  Please take FLONASE for nasal/sinus congestion.  Please take TESSALON during the day for cough.  Please take PROMETHAZINE DM at night for cough.   You were prescribed Promethazine DM, this medication can make you drowsy, please avoid driving or operating heavy machinery while taking this medication.     Please return here or go to the Emergency Department for any concerns or worsening of condition.    Please drink plenty of fluids.  Please get plenty of rest.  Please utilize saltwater  gargles for sore throat.  Please utilize warm tea, and lemon for sore throat relief.  Please utilize over-the-counter throat numbing spray and lozenges for sore throat relief.    If you do have Hypertension or palpitations, it is safe to take Coricidin HBP for relief of sinus symptoms.    Nasal irrigation with a saline spray or Netti Pot like device per their directions is also recommended.  If not allergic, please take over the counter Tylenol (Acetaminophen) and/or Motrin (Ibuprofen) as directed for control of pain and/or fever.  Please follow up with your primary care doctor or specialist as needed.    If you  smoke, please stop smoking.    COVID  -     benzonatate (TESSALON) 200 MG capsule; Take 1 capsule (200 mg total) by mouth 3 (three) times daily as needed for Cough.  Dispense: 30 capsule; Refill: 0  -     promethazine-dextromethorphan (PROMETHAZINE-DM) 6.25-15 mg/5 mL Syrp; Take 5 mLs by mouth every 6 (six) hours as needed (cough).  Dispense: 150 mL; Refill: 0  -     fluticasone propionate (FLONASE) 50 mcg/actuation nasal spray; 2 sprays (100 mcg total) by Each Nostril route once daily.  Dispense: 16 g; Refill: 0  -     cetirizine (ZYRTEC) 10 MG tablet; Take 1 tablet (10 mg total) by mouth once daily.  Dispense: 30 tablet; Refill: 0  -     nirmatrelvir-ritonavir 300 mg (150 mg x 2)-100 mg copackaged tablets (EUA); Take 3 tablets by mouth 2 (two) times daily for 5 days. Each dose contains 2 nirmatrelvir (pink tablets) and 1 ritonavir (white tablet). Take all 3 tablets together  Dispense: 30 tablet; Refill: 0    Body aches  -     SARS Coronavirus 2 Antigen, POCT Manual Read  -     POCT Influenza A/B MOLECULAR    Fever, unspecified fever cause  -     acetaminophen tablet 1,000 mg    Other orders  -     Cancel: POCT Strep A, Molecular      GinaMik Conde PA-C

## 2024-05-01 NOTE — PATIENT INSTRUCTIONS
You may return to work/school if you are fever (100.4 or greater) free for 24 hours without the use of fever reducing medications AND noticing improvement of symptoms.    Please take PAXLOVID for COVID. Please STOP taking ATORVASTATIN for 10 days while taking this medication, for this medication can cause serious side effects.  Please take CETIRIZINE for allergy relief.  Please take FLONASE for nasal/sinus congestion.  Please take TESSALON during the day for cough.  Please take PROMETHAZINE DM at night for cough.   You were prescribed Promethazine DM, this medication can make you drowsy, please avoid driving or operating heavy machinery while taking this medication.     Please return here or go to the Emergency Department for any concerns or worsening of condition.    Please drink plenty of fluids.  Please get plenty of rest.  Please utilize saltwater gargles for sore throat.  Please utilize warm tea, and lemon for sore throat relief.  Please utilize over-the-counter throat numbing spray and lozenges for sore throat relief.    If you do have Hypertension or palpitations, it is safe to take Coricidin HBP for relief of sinus symptoms.    Nasal irrigation with a saline spray or Netti Pot like device per their directions is also recommended.  If not allergic, please take over the counter Tylenol (Acetaminophen) and/or Motrin (Ibuprofen) as directed for control of pain and/or fever.  Please follow up with your primary care doctor or specialist as needed.    If you  smoke, please stop smoking.

## 2024-05-01 NOTE — LETTER
May 1, 2024      [facility]  [address], [address]  [place] [address]  Phone: [phone]  Fax: [phone]       Patient: Amanda Ponce   YOB: 1965  Date of Visit: 05/01/2024    To Whom It May Concern:    Camila Ponce  was at [facility] on 05/01/2024. The patient may return to work/school on 05/06/2024 or sooner if fever free for 24 hours without the use of fever reducing medications with no restrictions. If you have any questions or concerns, or if I can be of further assistance, please do not hesitate to contact me.    Sincerely,    Karthikeyan Conde PA-C

## 2024-05-22 ENCOUNTER — HOSPITAL ENCOUNTER (OUTPATIENT)
Dept: RADIOLOGY | Facility: HOSPITAL | Age: 59
Discharge: HOME | End: 2024-05-22
Attending: INTERNAL MEDICINE
Payer: COMMERCIAL

## 2024-05-22 ENCOUNTER — OFFICE VISIT (OUTPATIENT)
Dept: INTERNAL MEDICINE | Facility: CLINIC | Age: 59
End: 2024-05-22
Payer: COMMERCIAL

## 2024-05-22 VITALS
BODY MASS INDEX: 40.88 KG/M2 | WEIGHT: 202.81 LBS | OXYGEN SATURATION: 99 % | HEIGHT: 59 IN | DIASTOLIC BLOOD PRESSURE: 70 MMHG | SYSTOLIC BLOOD PRESSURE: 138 MMHG | HEART RATE: 91 BPM

## 2024-05-22 DIAGNOSIS — E11.69 HYPERLIPIDEMIA ASSOCIATED WITH TYPE 2 DIABETES MELLITUS: ICD-10-CM

## 2024-05-22 DIAGNOSIS — J01.90 ACUTE BACTERIAL SINUSITIS: ICD-10-CM

## 2024-05-22 DIAGNOSIS — E66.01 MORBID OBESITY WITH BMI OF 40.0-44.9, ADULT: ICD-10-CM

## 2024-05-22 DIAGNOSIS — I15.2 HYPERTENSION ASSOCIATED WITH DIABETES: ICD-10-CM

## 2024-05-22 DIAGNOSIS — G47.30 SLEEP APNEA, UNSPECIFIED TYPE: ICD-10-CM

## 2024-05-22 DIAGNOSIS — Z00.00 ANNUAL PHYSICAL EXAM: Primary | ICD-10-CM

## 2024-05-22 DIAGNOSIS — E11.59 HYPERTENSION ASSOCIATED WITH DIABETES: ICD-10-CM

## 2024-05-22 DIAGNOSIS — B96.89 ACUTE BACTERIAL SINUSITIS: ICD-10-CM

## 2024-05-22 DIAGNOSIS — E78.5 HYPERLIPIDEMIA ASSOCIATED WITH TYPE 2 DIABETES MELLITUS: ICD-10-CM

## 2024-05-22 DIAGNOSIS — M25.561 ACUTE PAIN OF RIGHT KNEE: ICD-10-CM

## 2024-05-22 DIAGNOSIS — M67.449 DIGITAL MUCOUS CYST OF FINGER: ICD-10-CM

## 2024-05-22 DIAGNOSIS — R80.9 TYPE 2 DIABETES MELLITUS WITH MICROALBUMINURIA, WITHOUT LONG-TERM CURRENT USE OF INSULIN: ICD-10-CM

## 2024-05-22 DIAGNOSIS — E11.29 TYPE 2 DIABETES MELLITUS WITH MICROALBUMINURIA, WITHOUT LONG-TERM CURRENT USE OF INSULIN: ICD-10-CM

## 2024-05-22 LAB
ALBUMIN/CREAT UR: 6.7 UG/MG (ref 0–30)
CREAT UR-MCNC: 90 MG/DL (ref 15–325)
MICROALBUMIN UR DL<=1MG/L-MCNC: 6 UG/ML

## 2024-05-22 PROCEDURE — 4010F ACE/ARB THERAPY RXD/TAKEN: CPT | Mod: CPTII,S$GLB,, | Performed by: INTERNAL MEDICINE

## 2024-05-22 PROCEDURE — 82043 UR ALBUMIN QUANTITATIVE: CPT | Performed by: INTERNAL MEDICINE

## 2024-05-22 PROCEDURE — 73562 X-RAY EXAM OF KNEE 3: CPT | Mod: 26,50,, | Performed by: RADIOLOGY

## 2024-05-22 PROCEDURE — 3066F NEPHROPATHY DOC TX: CPT | Mod: CPTII,S$GLB,, | Performed by: INTERNAL MEDICINE

## 2024-05-22 PROCEDURE — 3078F DIAST BP <80 MM HG: CPT | Mod: CPTII,S$GLB,, | Performed by: INTERNAL MEDICINE

## 2024-05-22 PROCEDURE — 1159F MED LIST DOCD IN RCRD: CPT | Mod: CPTII,S$GLB,, | Performed by: INTERNAL MEDICINE

## 2024-05-22 PROCEDURE — 3075F SYST BP GE 130 - 139MM HG: CPT | Mod: CPTII,S$GLB,, | Performed by: INTERNAL MEDICINE

## 2024-05-22 PROCEDURE — 3061F NEG MICROALBUMINURIA REV: CPT | Mod: CPTII,S$GLB,, | Performed by: INTERNAL MEDICINE

## 2024-05-22 PROCEDURE — 3044F HG A1C LEVEL LT 7.0%: CPT | Mod: CPTII,S$GLB,, | Performed by: INTERNAL MEDICINE

## 2024-05-22 PROCEDURE — 3008F BODY MASS INDEX DOCD: CPT | Mod: CPTII,S$GLB,, | Performed by: INTERNAL MEDICINE

## 2024-05-22 PROCEDURE — 99999 PR PBB SHADOW E&M-EST. PATIENT-LVL V: CPT | Mod: PBBFAC,,, | Performed by: INTERNAL MEDICINE

## 2024-05-22 PROCEDURE — G0009 ADMIN PNEUMOCOCCAL VACCINE: HCPCS | Mod: S$GLB,,, | Performed by: INTERNAL MEDICINE

## 2024-05-22 PROCEDURE — 99396 PREV VISIT EST AGE 40-64: CPT | Mod: S$GLB,,, | Performed by: INTERNAL MEDICINE

## 2024-05-22 PROCEDURE — 90677 PCV20 VACCINE IM: CPT | Mod: S$GLB,,, | Performed by: INTERNAL MEDICINE

## 2024-05-22 PROCEDURE — 73562 X-RAY EXAM OF KNEE 3: CPT | Mod: TC,50

## 2024-05-22 PROCEDURE — 1160F RVW MEDS BY RX/DR IN RCRD: CPT | Mod: CPTII,S$GLB,, | Performed by: INTERNAL MEDICINE

## 2024-05-22 RX ORDER — ORAL SEMAGLUTIDE 14 MG/1
14 TABLET ORAL DAILY
Qty: 30 TABLET | Refills: 3 | Status: SHIPPED | OUTPATIENT
Start: 2024-05-22

## 2024-05-22 RX ORDER — DOXYCYCLINE HYCLATE 100 MG
100 TABLET ORAL 2 TIMES DAILY
Qty: 14 TABLET | Refills: 0 | Status: SHIPPED | OUTPATIENT
Start: 2024-05-22

## 2024-05-22 RX ORDER — ASPIRIN 81 MG/1
81 TABLET ORAL DAILY
Start: 2024-05-22 | End: 2025-05-22

## 2024-05-22 RX ORDER — ORAL SEMAGLUTIDE 7 MG/1
7 TABLET ORAL DAILY
Qty: 30 TABLET | Refills: 0 | Status: SHIPPED | OUTPATIENT
Start: 2024-05-22

## 2024-05-22 NOTE — PROGRESS NOTES
Patient ID: Amanda Ponce is a 58 y.o. female.    Chief Complaint: Annual Exam      Assessment:       1. Annual physical exam    2. Type 2 diabetes mellitus with microalbuminuria, without long-term current use of insulin    3. Morbid obesity with BMI of 40.0-44.9, adult    4. Hypertension associated with diabetes    5. Hyperlipidemia associated with type 2 diabetes mellitus    6. Digital mucous cyst of finger    7. Acute pain of right knee    8. Sleep apnea, unspecified type    9. Acute bacterial sinusitis          Plan:         1. Annual physical exam    2. Type 2 diabetes mellitus with microalbuminuria, without long-term current use of insulin  -     Microalbumin/Creatinine Ratio, Urine  -     semaglutide (RYBELSUS) 7 mg tablet; Take 1 tablet (7 mg total) by mouth once daily.  Dispense: 30 tablet; Refill: 0  -     Hemoglobin A1C; Future; Expected date: 11/18/2024    3. Morbid obesity with BMI of 40.0-44.9, adult    4. Hypertension associated with diabetes    5. Hyperlipidemia associated with type 2 diabetes mellitus    6. Digital mucous cyst of finger  -     Ambulatory referral/consult to Orthopedics; Future; Expected date: 05/29/2024    7. Acute pain of right knee  -     X-Ray Knee 3 View Bilateral; Future; Expected date: 05/22/2024    8. Sleep apnea, unspecified type  -     Ambulatory referral/consult to Sleep Disorders; Future; Expected date: 05/29/2024    9. Acute bacterial sinusitis  -     doxycycline (VIBRA-TABS) 100 MG tablet; Take 1 tablet (100 mg total) by mouth 2 (two) times daily.  Dispense: 14 tablet; Refill: 0    Other orders  -     aspirin (ECOTRIN) 81 MG EC tablet; Take 1 tablet (81 mg total) by mouth once daily.  -     semaglutide (RYBELSUS) 14 mg tablet; Take 1 tablet (14 mg total) by mouth once daily.  Dispense: 30 tablet; Refill: 3  -     (VFC) pneumococcocal 20 vaccine (PREVNAR 20) syringe (preferred for >/= 2 months)        Importance of CARLOS  discussed at length, needs follow-up in the Sleep  Clinic, this will help with weight loss   Resume semaglutide, starting at 7 mg for 1 month and then increasing to 14 mg, to help with both diabetes and weight loss   Ortho for cyst on finger; x-rays of the knees and review, may need physical therapy versus Ortho for the knees   Urine for microalbumin   Prevnar 20 today   One-month follow-up, return sooner with problems in the interim   Mammogram scheduled   She declines COVID booster   She brings in outside records of her external eye exam    Subjective:   Annual exam    Had COVID a few weeks ago.  Did well but now has dry cough.   No chest pain or SOB.  No wheezing.  No sinus congestion.    Eye exam April 2024- no retinopathy.     Cyst on her finger she would like to have removed, it is bothersome.  It is the middle finger of her right hand.  No recent trauma or injury.  No drainage.  Also has some DJD of both knees which makes it hard for her to do a lot of walking.  She is trying to work on weight loss but this has been quite challenging.      CARLOS issues again discussed.  She was found to have severe CARLOS last year.  She needs to follow up in the sleep clinic and get started back on her treatment.  She is aware of this.    Patient Active Problem List:     Hyperlipidemia associated with type 2 diabetes mellitus     Vitamin D deficiency disease     Hypertension associated with diabetes     Type 2 diabetes mellitus with microalbuminuria, without long-term current use of insulin     Morbid obesity with BMI of 40.0-44.9, adult     Limited range of motion (ROM) of shoulder     Sleep apnea: severe per HST 7/23               Review of Systems   Constitutional:  Negative for fatigue.   HENT:  Positive for congestion, sinus pressure and sinus pain. Negative for hearing loss.           Recent URI symptoms   No fever, chills or sweats   Eyes:  Negative for visual disturbance.   Respiratory:  Positive for apnea and cough. Negative for shortness of breath.    Cardiovascular:   Negative for chest pain.   Gastrointestinal:  Negative for abdominal pain, constipation and diarrhea.   Genitourinary:  Negative for dysuria, frequency and vaginal bleeding.   Musculoskeletal:  Positive for arthralgias. Negative for joint swelling.        Cyst R middle finger   Skin:  Negative for rash.   Neurological:  Negative for weakness, light-headedness and headaches.   Psychiatric/Behavioral:  Negative for sleep disturbance.          Objective:      Physical Exam  Vitals and nursing note reviewed.   Constitutional:       Appearance: She is well-developed.   HENT:      Head: Normocephalic and atraumatic.      Right Ear: External ear normal.      Left Ear: External ear normal.      Nose: Nose normal.      Mouth/Throat:      Pharynx: No oropharyngeal exudate.   Eyes:      General: No scleral icterus.     Extraocular Movements: Extraocular movements intact.      Conjunctiva/sclera: Conjunctivae normal.   Neck:      Thyroid: No thyromegaly.      Vascular: No JVD.   Cardiovascular:      Rate and Rhythm: Normal rate and regular rhythm.      Pulses:           Dorsalis pedis pulses are 2+ on the right side and 2+ on the left side.        Posterior tibial pulses are 2+ on the right side and 2+ on the left side.      Heart sounds: Normal heart sounds. No murmur heard.     No gallop.   Pulmonary:      Effort: Pulmonary effort is normal. No respiratory distress.      Breath sounds: Normal breath sounds. No wheezing.   Abdominal:      General: Bowel sounds are normal. There is no distension.      Palpations: Abdomen is soft. There is no mass.      Tenderness: There is no abdominal tenderness. There is no guarding or rebound.   Musculoskeletal:         General: No tenderness. Normal range of motion.      Cervical back: Normal range of motion and neck supple.      Right foot: Normal range of motion. No deformity or bunion.      Left foot: Normal range of motion. No deformity or bunion.   Feet:      Right foot:       Protective Sensation: 5 sites tested.  5 sites sensed.      Skin integrity: No ulcer, blister or skin breakdown.      Left foot:      Protective Sensation: 5 sites tested.  5 sites sensed.      Skin integrity: No ulcer, blister or skin breakdown.   Lymphadenopathy:      Cervical: No cervical adenopathy.   Skin:     General: Skin is warm.      Findings: No erythema or rash.   Neurological:      General: No focal deficit present.      Mental Status: She is alert and oriented to person, place, and time.      Cranial Nerves: No cranial nerve deficit.      Coordination: Coordination normal.   Psychiatric:         Behavior: Behavior normal.         Thought Content: Thought content normal.         Judgment: Judgment normal.             Health Maintenance Due   Topic Date Due    Eye Exam  03/16/2024    Mammogram  07/14/2024

## 2024-05-23 ENCOUNTER — PATIENT OUTREACH (OUTPATIENT)
Dept: ADMINISTRATIVE | Facility: HOSPITAL | Age: 59
End: 2024-05-23
Payer: COMMERCIAL

## 2024-05-23 NOTE — PROGRESS NOTES
Health Maintenance Due   Topic Date Due    Mammogram  07/14/2024     Triggered LINKS and reconciled immunizations. Updated Care Everywhere. Scanned outside [facility] - negative retinopathy. Chart review completed.

## 2024-06-04 NOTE — PROGRESS NOTES
[facility]  History & Physical  Orthopedics    SUBJECTIVE:     Chief Complaint: right middle finger mucous cyst DIP    Referring Provider: Malini Sullivan MD     History of Present Illness:  Patient is a 58 y.o. right hand dominant female who presents today with complaints of  right middle finger mass a DIPJ.  Reports she notices it 6 months ago.  Reports tender to touch;  no changes in range of motion.  She denies any numbness or tingling.  Denies any previous trauma or hand surgery  The patient  works in a doctor's office.  The patient denies any fevers, chills, N/V, D/C and presents for evaluation.    Review of patient's allergies indicates:   Allergen Reactions    Ace inhibitors      Other reaction(s): cough       Past Medical History:   Diagnosis Date    DM (diabetes mellitus)     HTN (hypertension)     Obesity, Class II, BMI 35-39.9, with comorbidity 12/19/2016    Vitamin D deficiency disease 03/24/2015     Past Surgical History:   Procedure Laterality Date    CARDIAC SURGERY      Age 3- murmur    COLONOSCOPY N/A 10/19/2018    Procedure: COLONOSCOPY;  Surgeon: Cali De Jesus MD;  Location: [address]);  Service: Endoscopy;  Laterality: N/A;    HYSTERECTOMY      TLH WITHOUT BSO     Family History   Problem Relation Name Age of Onset    Diabetes Father      COPD Father      Hypertension Father      Hypertension Mother      Hypertension Brother Tomas     No Known Problems Daughter Latoya     No Known Problems Daughter Nancy     Hypertension Brother Layton     No Known Problems Brother Paolo     Glaucoma Neg Hx      Breast cancer Neg Hx      Colon cancer Neg Hx      Ovarian cancer Neg Hx       Social History     Tobacco Use    Smoking status: Never    Smokeless tobacco: Never   Substance Use Topics    Alcohol use: Yes     Comment: ocassionally    Drug use: No        Review of Systems:  Constitutional: Denies fever/chills  Neurological: Denies numbness/tingling (any exceptions noted  in orthopaedic exam)   Psychiatric/Behavioral: Denies change in normal mood  Eyes: Denies change in vision  Cardiovascular: Denies chest pain  Respiratory: Denies shortness of breath  Hematologic/Lymphatic: Denies easy bleeding/bruising   Skin: Denies new rash or skin lesions   Gastrointestinal: Denies nausea/vomitting/diarrhea, change in bowel habits, abdominal pain   Allergic/Immunologic: Denies adverse reactions to current medications  Musculoskeletal: see HPI      OBJECTIVE:     Vital Signs (Most Recent)       Physical Exam:  Gen:  No acute distress  CV:  Peripherally well-perfused.  Pulses 2+ bilaterally.  Lungs:  Normal respiratory effort.  Abdomen:  Soft, non-tender, non-distended  Head/Neck:  Normocephalic.  Atraumatic. No TTP, AROM and PROM intact without pain  Neuro:  CN intact without deficit, SILT throughout B/L Upper & Lower Extremities    Bilateral Hand/Wrist Examination:    Observation/Inspection:  Swelling  none    Deformity  none  Discoloration  none     Scars   none    Atrophy  none  +TTp right DIPJ  +small raised firm mass on dorsal right middle finger, 0.5cm.  HAND/WRIST EXAMINATION:  Finkelstein's Test   Neg  WHAT Test    Neg  Snuff box tenderness   Neg  Li's Test    Neg  Hook of Hamate Tenderness  Neg  CMC grind    Neg  Circumduction test   Neg    Neurovascular Exam:  Digits WWP, brisk CR < 3s throughout  NVI motor/LTS to M/R/U nerves, radial pulse 2+  Tinel's Test - Carpal Tunnel  Neg  Tinel's Test - Cubital Tunnel  Neg  Phalen's Test    Neg  Median Nerve Compression Test Neg    ROM hand full, painless    ROM wrist full, painless    ROM elbow full, painless    Abdomen not guarded  Respirations nonlabored  Perfusion intact    Diagnostic Results:   XRAY right hand 06/05/2024   X-rays reviewed and discussed with patient negative for any dislocations or fracture.  Imaging - I independently viewed the patient's imaging as well as the radiology report.      ASSESSMENT/PLAN:     There are no  diagnoses linked to this encounter.     58 y.o. yo female with right middle finger mucous     Amanda was seen today for ganglion cyst and finger pain.    Diagnoses and all orders for this visit:    Digital mucous cyst of finger  Comments:  right middle finger         Plan:   X-rays reviewed and discussed with patient today.  Negative for any dislocations or fractures or significant osteoarthritis.  There is bone spurring at the DIPJ of the right middle finger.  Based off HPI and exam today right middle finger mass consistent with a mucous cyst at DIPJ.  Discussed observation versus surgical intervention.  Discussed with patient due to the location adjacent to cuticle  there is a risk of nail  growth abnormality.  She would like to proceed with surgical intervention.  Risks and benefits were discussed. Surgery consents signed and completed in clinic today.

## 2024-06-05 ENCOUNTER — OFFICE VISIT (OUTPATIENT)
Dept: ORTHOPEDICS | Facility: CLINIC | Age: 59
End: 2024-06-05
Payer: COMMERCIAL

## 2024-06-05 ENCOUNTER — HOSPITAL ENCOUNTER (OUTPATIENT)
Dept: RADIOLOGY | Facility: HOSPITAL | Age: 59
Discharge: HOME | End: 2024-06-05
Payer: COMMERCIAL

## 2024-06-05 VITALS — HEIGHT: 59 IN | WEIGHT: 202.81 LBS | BODY MASS INDEX: 40.88 KG/M2

## 2024-06-05 DIAGNOSIS — M79.641 RIGHT HAND PAIN: Primary | ICD-10-CM

## 2024-06-05 DIAGNOSIS — M79.641 RIGHT HAND PAIN: ICD-10-CM

## 2024-06-05 DIAGNOSIS — M67.449 DIGITAL MUCOUS CYST OF FINGER: ICD-10-CM

## 2024-06-05 PROCEDURE — 73130 X-RAY EXAM OF HAND: CPT | Mod: TC,RT

## 2024-06-05 PROCEDURE — 73130 X-RAY EXAM OF HAND: CPT | Mod: 26,RT,, | Performed by: RADIOLOGY

## 2024-06-05 PROCEDURE — 4010F ACE/ARB THERAPY RXD/TAKEN: CPT | Mod: CPTII,S$GLB,,

## 2024-06-05 PROCEDURE — 1159F MED LIST DOCD IN RCRD: CPT | Mod: CPTII,S$GLB,,

## 2024-06-05 PROCEDURE — 99999 PR PBB SHADOW E&M-EST. PATIENT-LVL III: CPT | Mod: PBBFAC,,,

## 2024-06-05 PROCEDURE — 3066F NEPHROPATHY DOC TX: CPT | Mod: CPTII,S$GLB,,

## 2024-06-05 PROCEDURE — 3061F NEG MICROALBUMINURIA REV: CPT | Mod: CPTII,S$GLB,,

## 2024-06-05 PROCEDURE — 1160F RVW MEDS BY RX/DR IN RCRD: CPT | Mod: CPTII,S$GLB,,

## 2024-06-05 PROCEDURE — 99204 OFFICE O/P NEW MOD 45 MIN: CPT | Mod: S$GLB,,,

## 2024-06-05 PROCEDURE — 3008F BODY MASS INDEX DOCD: CPT | Mod: CPTII,S$GLB,,

## 2024-06-05 PROCEDURE — 3044F HG A1C LEVEL LT 7.0%: CPT | Mod: CPTII,S$GLB,,

## 2024-06-06 DIAGNOSIS — M79.641 RIGHT HAND PAIN: Primary | ICD-10-CM

## 2024-06-06 DIAGNOSIS — M67.449 DIGITAL MUCOUS CYST OF FINGER: ICD-10-CM

## 2024-06-10 ENCOUNTER — PATIENT MESSAGE (OUTPATIENT)
Dept: INTERNAL MEDICINE | Facility: CLINIC | Age: 59
End: 2024-06-10
Payer: COMMERCIAL

## 2024-06-14 ENCOUNTER — TELEPHONE (OUTPATIENT)
Dept: ORTHOPEDICS | Facility: CLINIC | Age: 59
End: 2024-06-14
Payer: COMMERCIAL

## 2024-06-14 NOTE — TELEPHONE ENCOUNTER
Spoke to patient and informed her to send over her short term disability paperwork to our office and we will file it for her the day of surgery----- Message from Shubham Summers sent at 6/14/2024  9:00 AM CDT -----  Regarding: pt advice  PATIENT CALL    Pt called regarding upcoming surgical procedure. It will be performed on her dominant hand and she'd like to know if there'll be any downtime/time off work that she needs to plan for. Please call back at [phone]

## 2024-06-24 ENCOUNTER — PATIENT MESSAGE (OUTPATIENT)
Dept: ORTHOPEDICS | Facility: CLINIC | Age: 59
End: 2024-06-24
Payer: COMMERCIAL

## 2024-06-27 ENCOUNTER — PATIENT MESSAGE (OUTPATIENT)
Dept: ORTHOPEDICS | Facility: CLINIC | Age: 59
End: 2024-06-27
Payer: COMMERCIAL

## 2024-06-27 ENCOUNTER — TELEPHONE (OUTPATIENT)
Dept: ORTHOPEDICS | Facility: CLINIC | Age: 59
End: 2024-06-27
Payer: COMMERCIAL

## 2024-06-27 NOTE — TELEPHONE ENCOUNTER
Spoke c pt. Reminded her of the 7-10 day turnaround for FMLA paperwork. Patient verbalized understanding and was thankful.     Provided pt with Patient Accounts [employer]  [phone] for her questions related to billing.     ----- Message from Ermelinda Ortega sent at 6/27/2024  9:40 AM CDT -----    ----- Message -----  From: Carmelina Parker  Sent: 6/27/2024   9:19 AM CDT  To: Pau SALDAÑA Staff    Name of Who is Calling:MARKEL BRONSON [[id number]]                What is the request in detail: Pt calling to get the statues of her FMLA paperwork.Please call back to further assist.                 Can the clinic reply by [facility]: No                What Number to Call Back if not in [facility]:[phone]

## 2024-07-01 ENCOUNTER — ANESTHESIA EVENT (OUTPATIENT)
Dept: SURGERY | Facility: HOSPITAL | Age: 59
End: 2024-07-01
Payer: COMMERCIAL

## 2024-07-02 ENCOUNTER — TELEPHONE (OUTPATIENT)
Dept: ORTHOPEDICS | Facility: CLINIC | Age: 59
End: 2024-07-02
Payer: COMMERCIAL

## 2024-07-02 NOTE — TELEPHONE ENCOUNTER
Spoke c pt. Pt stated that she received a call today confirming receipt of her signed FMLA paperwork. Patient verbalized understanding and was thankful.

## 2024-07-02 NOTE — ANESTHESIA PAT ROS NOTE
Called patient regarding oral Rybelsus. Reminded patient to hold scheduled oral dose for 24 hours prior to surgery on 7/12.

## 2024-07-06 DIAGNOSIS — I10 ESSENTIAL HYPERTENSION: ICD-10-CM

## 2024-07-06 NOTE — TELEPHONE ENCOUNTER
No care due was identified.  [facility] Embedded Care Due Messages. Reference number: [id number].   7/06/2024 2:14:05 PM CDT

## 2024-07-07 RX ORDER — LOSARTAN POTASSIUM 100 MG/1
100 TABLET ORAL
Qty: 90 TABLET | Refills: 3 | Status: SHIPPED | OUTPATIENT
Start: 2024-07-07

## 2024-07-07 RX ORDER — AMLODIPINE BESYLATE 10 MG/1
10 TABLET ORAL
Qty: 90 TABLET | Refills: 3 | Status: SHIPPED | OUTPATIENT
Start: 2024-07-07

## 2024-07-08 DIAGNOSIS — I10 ESSENTIAL HYPERTENSION: ICD-10-CM

## 2024-07-08 DIAGNOSIS — E78.2 MIXED HYPERLIPIDEMIA: ICD-10-CM

## 2024-07-08 RX ORDER — ATORVASTATIN CALCIUM 20 MG/1
20 TABLET, FILM COATED ORAL DAILY
Qty: 90 TABLET | Refills: 3 | Status: SHIPPED | OUTPATIENT
Start: 2024-07-08

## 2024-07-08 RX ORDER — LOSARTAN POTASSIUM 100 MG/1
100 TABLET ORAL DAILY
Qty: 90 TABLET | Refills: 3 | OUTPATIENT
Start: 2024-07-08

## 2024-07-08 RX ORDER — AMLODIPINE BESYLATE 10 MG/1
10 TABLET ORAL DAILY
Qty: 90 TABLET | Refills: 3 | OUTPATIENT
Start: 2024-07-08

## 2024-07-08 NOTE — TELEPHONE ENCOUNTER
No care due was identified.  [facility] Embedded Care Due Messages. Reference number: [id number].   7/08/2024 8:43:53 AM CDT

## 2024-07-08 NOTE — TELEPHONE ENCOUNTER
----- Message from Aida Galeano sent at 7/8/2024  8:27 AM CDT -----  Contact: Self/ [phone]  Requesting an RX refill or new RX.    Is this a refill or new RX: New    RX name and strength :  losartan (COZAAR) 100 MG tablet    Is this a 30 day or 90 day RX: 90    Pharmacy name and phone # :  [address]  [address] [place], [place] [address]  Phone: [phone] Fax: [phone]       The doctors have asked that we provide their patients with the following 2 reminders -- prescription refills can take up to 72 hours, and a friendly reminder that in the future you can use your [facility] account to request refills:         Requesting an RX refill or new RX.    Is this a refill or new RX: New    RX name and strength :  amLODIPine (NORVASC) 10 MG tablet    Is this a 30 day or 90 day RX: 90    Pharmacy name and phone # :  [address] [address] [address] [place] [id number]  Phone: [phone] Fax: [phone]      The doctors have asked that we provide their patients with the following 2 reminders -- prescription refills can take up to 72 hours, and a friendly reminder that in the future you can use your [facility] account to request refills:       Requesting an RX refill or new RX.    Is this a refill or new RX: New    RX name and strength :  atorvastatin (LIPITOR) 20 MG tablet    Is this a 30 day or 90 day RX: 90    Pharmacy name and phone # :  [facility]  [address]  [facility] [address]  Phone: [phone] Fax: [phone]      The doctors have asked that we provide their patients with the following 2 reminders -- prescription refills can take up to 72 hours, and a friendly reminder that in the future you can use your [facility] account to request refills:

## 2024-07-08 NOTE — TELEPHONE ENCOUNTER
Refill Decision Note   Amanda Ponce  is requesting a refill authorization.  Brief Assessment and Rationale for Refill:  Approve     Medication Therapy Plan:        Comments:     Note composed:10:50 PM 07/07/2024

## 2024-07-09 NOTE — TELEPHONE ENCOUNTER
Refill Decision Note   Amanda Ponce  is requesting a refill authorization.  Brief Assessment and Rationale for Refill:  Quick Discontinue  Approve     Medication Therapy Plan:        Comments:     Note composed:7:09 PM 07/08/2024

## 2024-07-10 RX ORDER — TRAMADOL HYDROCHLORIDE 50 MG/1
50 TABLET ORAL EVERY 6 HOURS PRN
Qty: 10 TABLET | Refills: 0 | Status: SHIPPED | OUTPATIENT
Start: 2024-07-10

## 2024-07-11 ENCOUNTER — TELEPHONE (OUTPATIENT)
Dept: ORTHOPEDICS | Facility: CLINIC | Age: 59
End: 2024-07-11
Payer: COMMERCIAL

## 2024-07-11 PROBLEM — M67.449 DIGITAL MUCOUS CYST OF FINGER: Status: ACTIVE | Noted: 2024-07-11

## 2024-07-11 NOTE — TELEPHONE ENCOUNTER
Spoke c pt. Informed pt of 7:45 a.m. arrival time for surgery at the [facility]. Reminded pt of NPO status & PO appt. Pt expressed understanding & was thankful.    (1) Oriented to own ability

## 2024-07-11 NOTE — H&P
H&P  Orthopaedics    SUBJECTIVE:     History of Present Illness:  Patient is a 58 y.o. right hand dominant female who presents today with complaints of  right middle finger mass a DIPJ.  Reports she notices it 6 months ago.  Reports tender to touch;  no changes in range of motion.  She denies any numbness or tingling.  Denies any previous trauma or hand surgery  The patient  works in a doctor's office.  The patient denies any fevers, chills, N/V, D/C and presents for evaluation.    Current Outpatient Medications   Medication Instructions    amLODIPine (NORVASC) 10 mg, Oral    aspirin (ECOTRIN) 81 mg, Oral, Daily    atorvastatin (LIPITOR) 20 mg, Oral, Daily    blood sugar diagnostic (ONE TOUCH ULTRA TEST) Strp 1 each, Misc.(Non-Drug; Combo Route), Daily PRN    cholecalciferol (vitamin D3) (VITAMIN D3) 2,000 Units, Oral, Daily    doxycycline (VIBRA-TABS) 100 mg, Oral, 2 times daily    fluticasone propionate (FLONASE) 100 mcg, Each Nostril, Daily    lancets Misc 1 each, Misc.(Non-Drug; Combo Route), Daily    losartan (COZAAR) 100 mg, Oral    meloxicam (MOBIC) 15 mg, Oral, Daily PRN    RYBELSUS 7 mg, Oral, Daily    RYBELSUS 14 mg, Oral, Daily    traMADoL (ULTRAM) 50 mg, Oral, Every 6 hours PRN         Review of patient's allergies indicates:   Allergen Reactions    Ace inhibitors      Other reaction(s): cough       Past Medical History:   Diagnosis Date    DM (diabetes mellitus)     HTN (hypertension)     Obesity, Class II, BMI 35-39.9, with comorbidity 12/19/2016    Vitamin D deficiency disease 03/24/2015     Past Surgical History:   Procedure Laterality Date    CARDIAC SURGERY      Age 3- murmur    COLONOSCOPY N/A 10/19/2018    Procedure: COLONOSCOPY;  Surgeon: Cali De Jesus MD;  Location: [address]);  Service: Endoscopy;  Laterality: N/A;    HYSTERECTOMY      TLH WITHOUT BSO     Family History   Problem Relation Name Age of Onset    Diabetes Father      COPD Father      Hypertension Father      Hypertension  Mother      Hypertension Brother Tomas     No Known Problems Daughter Latoya     No Known Problems Daughter Nancy     Hypertension Brother Layton     No Known Problems Brother Paolo     Glaucoma Neg Hx      Breast cancer Neg Hx      Colon cancer Neg Hx      Ovarian cancer Neg Hx       Social History     Tobacco Use    Smoking status: Never    Smokeless tobacco: Never   Substance Use Topics    Alcohol use: Yes     Comment: ocassionally    Drug use: No        Review of Systems:  Patient denies constitutional symptoms, cardiac symptoms, respiratory symptoms, GI symptoms.  The remainder of the musculoskeletal ROS is included in the HPI.      OBJECTIVE:     Physical Exam:  Gen:  No acute distress  CV:  Peripherally well-perfused.  Pulses 2+ bilaterally.  Lungs:  Normal respiratory effort.  Abdomen:  Soft, non-tender, non-distended  Head/Neck:  Normocephalic.  Atraumatic. No TTP, AROM and PROM intact without pain  Neuro:  CN intact without deficit, SILT throughout B/L Upper & Lower Extremities  Bilateral Hand/Wrist Examination:     Observation/Inspection:  Swelling                       none                  Deformity                     none  Discoloration               none                  Scars                           none                  Atrophy                        none  +TTp right DIPJ  +small raised firm mass on dorsal right middle finger, 0.5cm.    HAND/WRIST EXAMINATION:  Finkelstein's Test                                Neg  WHAT Test                                         Neg  Snuff box tenderness                          Neg  Li's Test                                     Neg  Hook of Hamate Tenderness              Neg  CMC grind                                           Neg  Circumduction test                              Neg     Neurovascular Exam:  Digits WWP, brisk CR < 3s throughout  NVI motor/LTS to M/R/U nerves, radial pulse 2+  Tinel's Test - Carpal Tunnel                Neg  Tinel's Test -  Cubital Tunnel               Neg  Phalen's Test                                      Neg  Median Nerve Compression TestNeg     ROM hand full, painless     ROM wrist full, painless    ROM elbow full, painless     Abdomen not guarded  Respirations nonlabored  Perfusion intact     Diagnostic Results:   XRAY right hand 06/05/2024   X-rays reviewed and discussed with patient negative for any dislocations or fracture.  Imaging - I independently viewed the patient's imaging as well as the radiology report.    ASSESSMENT/PLAN:     A/P: Amanda Ponce is a 58 y.o. with a right long finger mucus cyst       Plan:  - to OR for mucus cyst excision       Mehul Hairston MD  Orthopaedic Surgery Resident

## 2024-07-12 ENCOUNTER — ANESTHESIA (OUTPATIENT)
Dept: SURGERY | Facility: HOSPITAL | Age: 59
End: 2024-07-12
Payer: COMMERCIAL

## 2024-07-12 ENCOUNTER — HOSPITAL ENCOUNTER (OUTPATIENT)
Facility: HOSPITAL | Age: 59
Discharge: HOME | End: 2024-07-12
Attending: ORTHOPAEDIC SURGERY | Admitting: ORTHOPAEDIC SURGERY
Payer: COMMERCIAL

## 2024-07-12 DIAGNOSIS — M67.449 DIGITAL MUCOUS CYST OF FINGER: Primary | ICD-10-CM

## 2024-07-12 LAB
POCT GLUCOSE: 100 MG/DL (ref 70–110)
POCT GLUCOSE: 91 MG/DL (ref 70–110)

## 2024-07-12 PROCEDURE — D9220A PRA ANESTHESIA: Mod: CRNA,,, | Performed by: NURSE ANESTHETIST, CERTIFIED REGISTERED

## 2024-07-12 PROCEDURE — 36000707: Performed by: ORTHOPAEDIC SURGERY

## 2024-07-12 PROCEDURE — 99900035 HC TECH TIME PER 15 MIN (STAT)

## 2024-07-12 PROCEDURE — 94761 N-INVAS EAR/PLS OXIMETRY MLT: CPT

## 2024-07-12 PROCEDURE — 37000008 HC ANESTHESIA 1ST 15 MINUTES: Performed by: ORTHOPAEDIC SURGERY

## 2024-07-12 PROCEDURE — 63600175 PHARM REV CODE 636 W HCPCS: Mod: JZ,JG | Performed by: ORTHOPAEDIC SURGERY

## 2024-07-12 PROCEDURE — 26160 REMOVE TENDON SHEATH LESION: CPT | Mod: F7,,, | Performed by: ORTHOPAEDIC SURGERY

## 2024-07-12 PROCEDURE — 63600175 PHARM REV CODE 636 W HCPCS: Performed by: NURSE ANESTHETIST, CERTIFIED REGISTERED

## 2024-07-12 PROCEDURE — 88305 TISSUE EXAM BY PATHOLOGIST: CPT | Performed by: STUDENT IN AN ORGANIZED HEALTH CARE EDUCATION/TRAINING PROGRAM

## 2024-07-12 PROCEDURE — 25000003 PHARM REV CODE 250: Performed by: STUDENT IN AN ORGANIZED HEALTH CARE EDUCATION/TRAINING PROGRAM

## 2024-07-12 PROCEDURE — 71000015 HC POSTOP RECOV 1ST HR: Performed by: ORTHOPAEDIC SURGERY

## 2024-07-12 PROCEDURE — D9220A PRA ANESTHESIA: Mod: ANES,,, | Performed by: ANESTHESIOLOGY

## 2024-07-12 PROCEDURE — 71000033 HC RECOVERY, INTIAL HOUR: Performed by: ORTHOPAEDIC SURGERY

## 2024-07-12 PROCEDURE — 36000706: Performed by: ORTHOPAEDIC SURGERY

## 2024-07-12 PROCEDURE — 25000003 PHARM REV CODE 250: Performed by: NURSE ANESTHETIST, CERTIFIED REGISTERED

## 2024-07-12 PROCEDURE — 25000003 PHARM REV CODE 250: Performed by: ORTHOPAEDIC SURGERY

## 2024-07-12 PROCEDURE — 88307 TISSUE EXAM BY PATHOLOGIST: CPT | Performed by: STUDENT IN AN ORGANIZED HEALTH CARE EDUCATION/TRAINING PROGRAM

## 2024-07-12 PROCEDURE — 88307 TISSUE EXAM BY PATHOLOGIST: CPT | Mod: 26,,, | Performed by: STUDENT IN AN ORGANIZED HEALTH CARE EDUCATION/TRAINING PROGRAM

## 2024-07-12 PROCEDURE — 25000003 PHARM REV CODE 250: Performed by: NURSE PRACTITIONER

## 2024-07-12 PROCEDURE — 37000009 HC ANESTHESIA EA ADD 15 MINS: Performed by: ORTHOPAEDIC SURGERY

## 2024-07-12 RX ORDER — BACITRACIN ZINC 500 UNIT/G
OINTMENT (GRAM) TOPICAL
Status: DISCONTINUED | OUTPATIENT
Start: 2024-07-12 | End: 2024-07-12 | Stop reason: HOSPADM

## 2024-07-12 RX ORDER — MUPIROCIN 20 MG/G
OINTMENT TOPICAL
Status: DISCONTINUED | OUTPATIENT
Start: 2024-07-12 | End: 2024-07-12 | Stop reason: HOSPADM

## 2024-07-12 RX ORDER — SODIUM CHLORIDE 0.9 % (FLUSH) 0.9 %
3 SYRINGE (ML) INJECTION
Status: DISCONTINUED | OUTPATIENT
Start: 2024-07-12 | End: 2024-07-12 | Stop reason: HOSPADM

## 2024-07-12 RX ORDER — PROPOFOL 10 MG/ML
VIAL (ML) INTRAVENOUS
Status: DISCONTINUED | OUTPATIENT
Start: 2024-07-12 | End: 2024-07-12

## 2024-07-12 RX ORDER — DEXMEDETOMIDINE HYDROCHLORIDE 100 UG/ML
INJECTION, SOLUTION INTRAVENOUS
Status: DISCONTINUED | OUTPATIENT
Start: 2024-07-12 | End: 2024-07-12

## 2024-07-12 RX ORDER — LIDOCAINE HYDROCHLORIDE 10 MG/ML
INJECTION, SOLUTION EPIDURAL; INFILTRATION; INTRACAUDAL; PERINEURAL
Status: DISCONTINUED | OUTPATIENT
Start: 2024-07-12 | End: 2024-07-12 | Stop reason: HOSPADM

## 2024-07-12 RX ORDER — ONDANSETRON HYDROCHLORIDE 2 MG/ML
4 INJECTION, SOLUTION INTRAVENOUS ONCE AS NEEDED
Status: DISCONTINUED | OUTPATIENT
Start: 2024-07-12 | End: 2024-07-12 | Stop reason: HOSPADM

## 2024-07-12 RX ORDER — ONDANSETRON HYDROCHLORIDE 2 MG/ML
INJECTION, SOLUTION INTRAVENOUS
Status: DISCONTINUED | OUTPATIENT
Start: 2024-07-12 | End: 2024-07-12

## 2024-07-12 RX ORDER — DEXAMETHASONE SODIUM PHOSPHATE 4 MG/ML
INJECTION, SOLUTION INTRA-ARTICULAR; INTRALESIONAL; INTRAMUSCULAR; INTRAVENOUS; SOFT TISSUE
Status: DISCONTINUED | OUTPATIENT
Start: 2024-07-12 | End: 2024-07-12

## 2024-07-12 RX ORDER — MIDAZOLAM HYDROCHLORIDE 1 MG/ML
INJECTION, SOLUTION INTRAMUSCULAR; INTRAVENOUS
Status: DISCONTINUED | OUTPATIENT
Start: 2024-07-12 | End: 2024-07-12

## 2024-07-12 RX ORDER — BUPIVACAINE HYDROCHLORIDE 2.5 MG/ML
INJECTION, SOLUTION EPIDURAL; INFILTRATION; INTRACAUDAL
Status: DISCONTINUED | OUTPATIENT
Start: 2024-07-12 | End: 2024-07-12 | Stop reason: HOSPADM

## 2024-07-12 RX ORDER — ACETAMINOPHEN 500 MG
1000 TABLET ORAL
Status: COMPLETED | OUTPATIENT
Start: 2024-07-12 | End: 2024-07-12

## 2024-07-12 RX ORDER — PROPOFOL 10 MG/ML
VIAL (ML) INTRAVENOUS CONTINUOUS PRN
Status: DISCONTINUED | OUTPATIENT
Start: 2024-07-12 | End: 2024-07-12

## 2024-07-12 RX ADMIN — ONDANSETRON 4 MG: 2 INJECTION INTRAMUSCULAR; INTRAVENOUS at 10:07

## 2024-07-12 RX ADMIN — MIDAZOLAM HYDROCHLORIDE 2 MG: 2 INJECTION, SOLUTION INTRAMUSCULAR; INTRAVENOUS at 10:07

## 2024-07-12 RX ADMIN — DEXTROSE MONOHYDRATE 2 G: 50 INJECTION, SOLUTION INTRAVENOUS at 10:07

## 2024-07-12 RX ADMIN — PROPOFOL 125 MCG/KG/MIN: 10 INJECTION, EMULSION INTRAVENOUS at 10:07

## 2024-07-12 RX ADMIN — SODIUM CHLORIDE: 9 INJECTION, SOLUTION INTRAVENOUS at 10:07

## 2024-07-12 RX ADMIN — DEXMEDETOMIDINE 12 MCG: 100 INJECTION, SOLUTION, CONCENTRATE INTRAVENOUS at 10:07

## 2024-07-12 RX ADMIN — ACETAMINOPHEN 1000 MG: 500 TABLET ORAL at 08:07

## 2024-07-12 RX ADMIN — PROPOFOL 30 MG: 10 INJECTION, EMULSION INTRAVENOUS at 10:07

## 2024-07-12 RX ADMIN — DEXAMETHASONE SODIUM PHOSPHATE 4 MG: 4 INJECTION, SOLUTION INTRAMUSCULAR; INTRAVENOUS at 10:07

## 2024-07-12 RX ADMIN — MUPIROCIN: 20 OINTMENT TOPICAL at 08:07

## 2024-07-12 NOTE — ANESTHESIA PREPROCEDURE EVALUATION
07/12/2024  Pre-operative evaluation for Procedure(s) (LRB):  RIGHT MIDDLE FINGER MUCOUST , CYST EXCISE (Right)    Amanda Ponce is a 58 y.o. female     Patient Active Problem List   Diagnosis    Hyperlipidemia associated with type 2 diabetes mellitus    Vitamin D deficiency disease    Hypertension associated with diabetes    Type 2 diabetes mellitus with microalbuminuria, without long-term current use of insulin    Morbid obesity with BMI of 40.0-44.9, adult    Limited range of motion (ROM) of shoulder    Sleep apnea: severe per HST 7/23    Digital mucous cyst of finger       Review of patient's allergies indicates:   Allergen Reactions    Ace inhibitors      Other reaction(s): cough       No current facility-administered medications on file prior to encounter.     Current Outpatient Medications on File Prior to Encounter   Medication Sig Dispense Refill    aspirin (ECOTRIN) 81 MG EC tablet Take 1 tablet (81 mg total) by mouth once daily.      blood sugar diagnostic (ONE TOUCH ULTRA TEST) Strp 1 each by Misc.(Non-Drug; Combo Route) route daily as needed. 35 strip 6    cholecalciferol, vitamin D3, (VITAMIN D3) 25 mcg (1,000 unit) capsule Take 2 capsules (2,000 Units total) by mouth once daily. 60 capsule 12    doxycycline (VIBRA-TABS) 100 MG tablet Take 1 tablet (100 mg total) by mouth 2 (two) times daily. 14 tablet 0    fluticasone propionate (FLONASE) 50 mcg/actuation nasal spray 2 sprays (100 mcg total) by Each Nostril route once daily. 16 g 0    lancets Misc 1 each by Misc.(Non-Drug; Combo Route) route once daily. 100 each 3    meloxicam (MOBIC) 15 MG tablet Take 1 tablet (15 mg total) by mouth daily as needed for Pain. 30 tablet 0    semaglutide (RYBELSUS) 14 mg tablet Take 1 tablet (14 mg total) by mouth once daily. 30 tablet 3    semaglutide (RYBELSUS) 7 mg tablet Take 1 tablet (7 mg total) by mouth  once daily. 30 tablet 0       Past Surgical History:   Procedure Laterality Date    CARDIAC SURGERY      Age 3- murmur    COLONOSCOPY N/A 10/19/2018    Procedure: COLONOSCOPY;  Surgeon: Cali De Jesus MD;  Location: [facility] ([address] [place] [address]);  Service: Endoscopy;  Laterality: N/A;    HYSTERECTOMY      TLH WITHOUT BSO       Social History     Socioeconomic History    Marital status:     Number of children: 2   Tobacco Use    Smoking status: Never    Smokeless tobacco: Never   Substance and Sexual Activity    Alcohol use: Yes     Comment: ocassionally    Drug use: No    Sexual activity: Yes     Partners: Male     Birth control/protection: None         EKD Echo:  No results found for this or any previous visit.        Pre-op Assessment    I have reviewed the Patient Summary Reports.     I have reviewed the Nursing Notes. I have reviewed the NPO Status.   I have reviewed the Medications.     Review of Systems  Anesthesia Hx:             Denies Family Hx of Anesthesia complications.    Denies Personal Hx of Anesthesia complications.                    Cardiovascular:  Exercise tolerance: good   Hypertension     Denies Dysrhythmias.   Denies Angina.       Denies MEYERS.                            Pulmonary:    Denies COPD.  Denies Asthma.                    Renal/:   Denies Chronic Renal Disease.                Hepatic/GI:      Denies GERD.             Neurological:    Denies CVA.    Denies Seizures.                                Endocrine:  Diabetes, well controlled, type 2               Physical Exam  General: Well nourished, Cooperative, Alert and Oriented    Airway:  Mallampati: II / I  Mouth Opening: Normal  TM Distance: Normal  Tongue: Normal  Neck ROM: Normal ROM    Dental:  Intact    Chest/Lungs:  Clear to auscultation, Normal Respiratory Rate    Heart:  Rate: Normal  Rhythm: Regular Rhythm        Anesthesia Plan  Type of Anesthesia, risks & benefits discussed:    Anesthesia Type: Gen Natural  Airway  Intra-op Monitoring Plan: Standard ASA Monitors  Post Op Pain Control Plan: multimodal analgesia  Induction:  IV  Informed Consent: Informed consent signed with the Patient and all parties understand the risks and agree with anesthesia plan.  All questions answered. Patient consented to blood products? Yes  ASA Score: 2  Day of Surgery Review of History & Physical: H&P Update referred to the surgeon/provider.    Ready For Surgery From Anesthesia Perspective.     .

## 2024-07-12 NOTE — TRANSFER OF CARE
"Anesthesia Transfer of Care Note    Patient: Amanda Ponce    Procedure(s) Performed: Procedure(s) (LRB):  RIGHT MIDDLE FINGER MUCOUST , CYST EXCISE (Right)    Patient location: PACU    Anesthesia Type: MAC    Transport from OR: Transported from OR on 6-10 L/min O2 by face mask with adequate spontaneous ventilation    Post pain: adequate analgesia    Post assessment: no apparent anesthetic complications    Post vital signs: stable    Level of consciousness: alert, awake and oriented    Nausea/Vomiting: no nausea/vomiting    Complications: none    Transfer of care protocol was followed      Last vitals: Visit Vitals  BP (!) 164/80 (BP Location: Left arm, Patient Position: Lying)   Pulse 91   Temp 36.8 °C (98.2 °F) (Oral)   Resp 17   Ht 4' 11" (1.499 m)   Wt 91.6 kg (202 lb)   SpO2 98%   Breastfeeding No   BMI 40.80 kg/m²     "

## 2024-07-12 NOTE — BRIEF OP NOTE
[place] - Surgery ([facility])  Brief Operative Note    Surgery Date: 7/12/2024     Surgeons and Role:     * Malini Mai MD - Primary    Assisting Surgeon: None    Pre-op Diagnosis:  Right hand pain [M79.641]  Digital mucous cyst of finger [M67.449]    Post-op Diagnosis:  Post-Op Diagnosis Codes:     * Right hand pain [M79.641]     * Digital mucous cyst of finger [M67.449]    Procedure(s) (LRB):  RIGHT MIDDLE FINGER MUCOUST , CYST EXCISE (Right)    Anesthesia: Local MAC    Operative Findings: Excision mucus cyst right long finger     Estimated Blood Loss: <1cc         Specimens:   Specimen (24h ago, onward)       Start     Ordered    07/12/24 1050  Specimen to Pathology, Surgery Orthopedics  Once        Comments: Pre-op Diagnosis: Right hand pain [M79.641]Digital mucous cyst of finger [M67.449]Procedure(s):RIGHT MIDDLE FINGER MUCOUST , CYST EXCISE Number of specimens: 1Name of specimens: 1. Mass right middle finger     References:    Click here for ordering Quick Tip   Question Answer Comment   Procedure Type: Orthopedics    Release to patient Immediate        07/12/24 1051                      Discharge Note    OUTCOME: Patient tolerated treatment/procedure well without complication and is now ready for discharge.    DISPOSITION: Home or Self Care    FINAL DIAGNOSIS:  Digital mucous cyst of finger    FOLLOWUP: In clinic    DISCHARGE INSTRUCTIONS:    Discharge Procedure Orders   Sponge bath only until clinic visit     Keep surgical extremity elevated     Leave dressing on - Keep it clean, dry, and intact until clinic visit     Notify your health care provider if you experience any of the following:  temperature >100.4     Notify your health care provider if you experience any of the following:  persistent nausea and vomiting or diarrhea     Notify your health care provider if you experience any of the following:  severe uncontrolled pain     Activity as tolerated

## 2024-07-12 NOTE — PLAN OF CARE
VSS.  Patient tolerating oral liquids without difficulty.   No apparent s&s of distress noted at this time, no complaints voiced at this time.   Discharge instructions reviewed with patient/family/friend with good verbal feedback received.   Post op medications bedside delivery, DME none.  Patient ready for discharge.

## 2024-07-15 VITALS
BODY MASS INDEX: 40.72 KG/M2 | HEART RATE: 81 BPM | SYSTOLIC BLOOD PRESSURE: 128 MMHG | WEIGHT: 202 LBS | RESPIRATION RATE: 17 BRPM | TEMPERATURE: 98 F | HEIGHT: 59 IN | OXYGEN SATURATION: 99 % | DIASTOLIC BLOOD PRESSURE: 79 MMHG

## 2024-07-15 NOTE — OP NOTE
[facility] Surgery ([facility])  Surgery Department  Operative Note    SUMMARY     Date of Procedure: 7/12/2024     Procedure: Procedure(s) (LRB):  RIGHT MIDDLE FINGER MUCOUST , CYST EXCISE (Right)   Exostectomy right long finger  Surgeons and Role:     * Malini Mai MD - Primary    Assisting Surgeon:  Esteban    Pre-Operative Diagnosis: Right hand pain [M79.641]  Digital mucous cyst of finger [M67.449]    Post-Operative Diagnosis: Post-Op Diagnosis Codes:     * Right hand pain [M79.641]     * Digital mucous cyst of finger [M67.449]    Anesthesia: Local MAC    Technical Procedures Used:  Surgery    Description of the Findings of the Procedure:  Indication for procedure Ms. Ponce is a 58-year-old female who had an enlarging digital mucous cyst of her right middle finger after much discussion with the patient elected for surgical intervention risks and benefits were explained to the patient in clinic consents were signed in clinic    Procedure in detail the correct site was marked with the patient's participation the holding area the patient was brought to the operating placed in supine position underwent MAC anesthesia well-padded nonsterile tourniquet was placed on the right forearm a time-out was conducted for the correct procedure to be indicated IV antibiotics given patient preoperatively under sterile conditions an injection of lidocaine 1% was injected as a block the arm was prepped and draped in normal sterile fashion a T-type incision was marked out over the cyst the cyst was excised in completion passed off the back table for specimen the skin flaps were elevated rongeur was used to remove the osteophytes from underneath the extensor tendon rasp was then used to smooth out the areas irrigated copious amounts normal saline nylon closed the skin though there was a skin defect originally after the cyst was removed was a portion of the skin was removed with the cyst the skin did close quite well without  tension sterile dressing was applied patient was placed in a well-padded finger splint tolerated the procedure well was brought to cover room stable condition    Postop plans patient keep the dressing clean dry and intact will see the patient back at 2 weeks time sutures out at that time    Significant Surgical Tasks Conducted by the Assistant(s), if Applicable:  Retracted    Complications: No    Estimated Blood Loss (EBL): * No values recorded between 7/12/2024 10:43 AM and 7/12/2024 11:01 AM *           Implants: * No implants in log *    Specimens:   Specimen (24h ago, onward)      None                    Condition: Good    Disposition: PACU - hemodynamically stable.    Attestation: I performed the procedure.    Discharge Note    SUMMARY     Admit Date: 7/12/2024    Discharge Date and Time: 7/12/2024 12:02 PM    Hospital Course (synopsis of major diagnoses, care, treatment, and services provided during the course of the hospital stay):  Surgery     Final Diagnosis: Post-Op Diagnosis Codes:     * Right hand pain [M79.641]     * Digital mucous cyst of finger [M67.449]    Disposition: Home or Self Care    Follow Up/Patient Instructions:     Medications:  Reconciled Home Medications:      Medication List        START taking these medications      traMADoL 50 mg tablet  Commonly known as: ULTRAM  Take 1 tablet (50 mg total) by mouth every 6 (six) hours as needed for Pain.            CONTINUE taking these medications      amLODIPine 10 MG tablet  Commonly known as: NORVASC  Take 1 tablet by mouth once daily     aspirin 81 MG EC tablet  Commonly known as: ECOTRIN  Take 1 tablet (81 mg total) by mouth once daily.     atorvastatin 20 MG tablet  Commonly known as: LIPITOR  Take 1 tablet (20 mg total) by mouth once daily.     blood sugar diagnostic Strp  Commonly known as: ONETOUCH ULTRA TEST  1 each by Misc.(Non-Drug; Combo Route) route daily as needed.     cholecalciferol (vitamin D3) 25 mcg (1,000 unit) capsule  Commonly  known as: VITAMIN D3  Take 2 capsules (2,000 Units total) by mouth once daily.     doxycycline 100 MG tablet  Commonly known as: VIBRA-TABS  Take 1 tablet (100 mg total) by mouth 2 (two) times daily.     fluticasone propionate 50 mcg/actuation nasal spray  Commonly known as: FLONASE  2 sprays (100 mcg total) by Each Nostril route once daily.     lancets Misc  1 each by Misc.(Non-Drug; Combo Route) route once daily.     losartan 100 MG tablet  Commonly known as: COZAAR  Take 1 tablet by mouth once daily     meloxicam 15 MG tablet  Commonly known as: MOBIC  Take 1 tablet (15 mg total) by mouth daily as needed for Pain.     * RYBELSUS 7 mg tablet  Generic drug: semaglutide  Take 1 tablet (7 mg total) by mouth once daily.     * RYBELSUS 14 mg tablet  Generic drug: semaglutide  Take 1 tablet (14 mg total) by mouth once daily.           * This list has 2 medication(s) that are the same as other medications prescribed for you. Read the directions carefully, and ask your doctor or other care provider to review them with you.                Discharge Procedure Orders   Sponge bath only until clinic visit     Keep surgical extremity elevated     Leave dressing on - Keep it clean, dry, and intact until clinic visit     Notify your health care provider if you experience any of the following:  temperature >100.4     Notify your health care provider if you experience any of the following:  persistent nausea and vomiting or diarrhea     Notify your health care provider if you experience any of the following:  severe uncontrolled pain     Activity as tolerated

## 2024-07-16 NOTE — ANESTHESIA POSTPROCEDURE EVALUATION
Anesthesia Post Evaluation    Patient: Amanda Ponce    Procedure(s) Performed: Procedure(s) (LRB):  RIGHT MIDDLE FINGER MUCOUST , CYST EXCISE (Right)    Final Anesthesia Type: general      Patient location during evaluation: PACU  Patient participation: Yes- Able to Participate  Level of consciousness: awake and alert and oriented  Post-procedure vital signs: reviewed and stable  Pain management: adequate  Airway patency: patent    PONV status at discharge: No PONV  Anesthetic complications: no      Cardiovascular status: blood pressure returned to baseline and hemodynamically stable  Respiratory status: unassisted, room air and spontaneous ventilation  Hydration status: euvolemic  Follow-up not needed.              Vitals Value Taken Time   /79 07/12/24 1142   Temp 36.4 °C (97.5 °F) 07/12/24 1105   Pulse 68 07/12/24 1141   Resp 21 07/12/24 1141   SpO2 78 % 07/12/24 1141   Vitals shown include unfiled device data.      Event Time   Out of Recovery 11:32:00         Pain/Tone Score: No data recorded

## 2024-07-19 LAB
FINAL PATHOLOGIC DIAGNOSIS: NORMAL
GROSS: NORMAL
Lab: NORMAL
MICROSCOPIC EXAM: NORMAL

## 2024-07-29 ENCOUNTER — TELEPHONE (OUTPATIENT)
Dept: ORTHOPEDICS | Facility: CLINIC | Age: 59
End: 2024-07-29
Payer: COMMERCIAL

## 2024-08-01 ENCOUNTER — OFFICE VISIT (OUTPATIENT)
Dept: ORTHOPEDICS | Facility: CLINIC | Age: 59
End: 2024-08-01
Payer: COMMERCIAL

## 2024-08-01 VITALS — BODY MASS INDEX: 40.71 KG/M2 | HEIGHT: 59 IN | WEIGHT: 201.94 LBS

## 2024-08-01 DIAGNOSIS — Z98.890 POSTOPERATIVE STATE: ICD-10-CM

## 2024-08-01 DIAGNOSIS — M67.449 DIGITAL MUCOUS CYST OF FINGER: Primary | ICD-10-CM

## 2024-08-01 PROCEDURE — 99999 PR PBB SHADOW E&M-EST. PATIENT-LVL IV: CPT | Mod: PBBFAC,,,

## 2024-08-01 NOTE — PROGRESS NOTES
Ms. Ponce is here today for a post-operative visit.  She is 3 weeks status post right middle finger digital mucous cyst excision by Dr. Mai on 07/12/2024. She reports that she is well.  Pain is 0/10.  She is not taking pain medication .  She denies fever, chills, and sweats since the time of the surgery. Denies any numbness or tingling.     Physical exam:    There were no vitals filed for this visit.  Vital signs are stable, patient is afebrile.  Patient is well dressed and well groomed, no acute distress.  Alert and oriented to person, place, and time.  Post op dressing taken down.  Incision is clean, dry and intact.  There is no erythema or exudate.  There is no sign of any infection. She is NVI. Sutures removed without difficulty.  Patient not able to make a full composite fist.     Assessment:   s/p right middle finger digital mucous cyst excision    Amanda was seen today for post-op evaluation.    Diagnoses and all orders for this visit:    Digital mucous cyst of finger  -     Ambulatory referral/consult to Physical/Occupational Therapy; Future    Postoperative state         Plan:      - PO instruction reviewed and provided to patient  Educated patient on scar massage.  Provided patient with finger splint with coban in case extensor lag is noted.   Ordered therapy to help with her ROM.  She will FU in 4 weeks to assess her ROM.  Patient would like to return to work in 4 weeks.   Path reviewed.    Final Pathologic Diagnosis Soft tissue, right middle finger, excision:  - Fragment of benign hyperkeratotic skin  - No cyst lining identified  - Negative for inflammation or malignanc

## 2024-08-06 ENCOUNTER — CLINICAL SUPPORT (OUTPATIENT)
Dept: REHABILITATION | Facility: HOSPITAL | Age: 59
End: 2024-08-06
Payer: COMMERCIAL

## 2024-08-06 DIAGNOSIS — M67.449 DIGITAL MUCOUS CYST OF FINGER: ICD-10-CM

## 2024-08-06 DIAGNOSIS — M25.641 STIFFNESS OF FINGER JOINT OF RIGHT HAND: Primary | ICD-10-CM

## 2024-08-06 DIAGNOSIS — M79.641 RIGHT HAND PAIN: ICD-10-CM

## 2024-08-06 PROCEDURE — 97165 OT EVAL LOW COMPLEX 30 MIN: CPT

## 2024-08-07 PROBLEM — M79.641 RIGHT HAND PAIN: Status: ACTIVE | Noted: 2024-08-07

## 2024-08-07 PROBLEM — M25.641 STIFFNESS OF FINGER JOINT OF RIGHT HAND: Status: ACTIVE | Noted: 2024-08-07

## 2024-08-09 ENCOUNTER — CLINICAL SUPPORT (OUTPATIENT)
Dept: REHABILITATION | Facility: HOSPITAL | Age: 59
End: 2024-08-09
Payer: COMMERCIAL

## 2024-08-09 DIAGNOSIS — M79.641 RIGHT HAND PAIN: ICD-10-CM

## 2024-08-09 DIAGNOSIS — M25.641 STIFFNESS OF FINGER JOINT OF RIGHT HAND: Primary | ICD-10-CM

## 2024-08-09 PROCEDURE — 97010 HOT OR COLD PACKS THERAPY: CPT

## 2024-08-09 PROCEDURE — 97140 MANUAL THERAPY 1/> REGIONS: CPT

## 2024-08-09 PROCEDURE — 97110 THERAPEUTIC EXERCISES: CPT

## 2024-08-09 PROCEDURE — 97530 THERAPEUTIC ACTIVITIES: CPT

## 2024-08-11 ENCOUNTER — HOSPITAL ENCOUNTER (EMERGENCY)
Facility: HOSPITAL | Age: 59
Discharge: HOME | End: 2024-08-11
Attending: EMERGENCY MEDICINE
Payer: COMMERCIAL

## 2024-08-11 VITALS
BODY MASS INDEX: 40.72 KG/M2 | RESPIRATION RATE: 18 BRPM | HEART RATE: 74 BPM | SYSTOLIC BLOOD PRESSURE: 145 MMHG | DIASTOLIC BLOOD PRESSURE: 88 MMHG | WEIGHT: 202 LBS | OXYGEN SATURATION: 98 % | HEIGHT: 59 IN | TEMPERATURE: 99 F

## 2024-08-11 DIAGNOSIS — M54.50 ACUTE LEFT-SIDED LOW BACK PAIN WITHOUT SCIATICA: Primary | ICD-10-CM

## 2024-08-11 LAB — POCT GLUCOSE: 106 MG/DL (ref 70–110)

## 2024-08-11 PROCEDURE — 82962 GLUCOSE BLOOD TEST: CPT | Mod: ER

## 2024-08-11 PROCEDURE — 99284 EMERGENCY DEPT VISIT MOD MDM: CPT | Mod: 25,ER

## 2024-08-11 PROCEDURE — 96372 THER/PROPH/DIAG INJ SC/IM: CPT

## 2024-08-11 PROCEDURE — 63600175 PHARM REV CODE 636 W HCPCS: Mod: ER

## 2024-08-11 RX ORDER — ORPHENADRINE CITRATE 30 MG/ML
60 INJECTION INTRAMUSCULAR; INTRAVENOUS
Status: COMPLETED | OUTPATIENT
Start: 2024-08-11 | End: 2024-08-11

## 2024-08-11 RX ORDER — CYCLOBENZAPRINE HCL 5 MG
5 TABLET ORAL 3 TIMES DAILY PRN
Qty: 20 TABLET | Refills: 0 | Status: SHIPPED | OUTPATIENT
Start: 2024-08-11 | End: 2024-08-21

## 2024-08-11 RX ORDER — KETOROLAC TROMETHAMINE 30 MG/ML
15 INJECTION, SOLUTION INTRAMUSCULAR; INTRAVENOUS
Status: COMPLETED | OUTPATIENT
Start: 2024-08-11 | End: 2024-08-11

## 2024-08-11 RX ORDER — NAPROXEN 500 MG/1
500 TABLET ORAL 2 TIMES DAILY
Qty: 20 TABLET | Refills: 0 | Status: SHIPPED | OUTPATIENT
Start: 2024-08-11

## 2024-08-11 RX ORDER — LIDOCAINE 50 MG/G
1 PATCH TOPICAL ONCE
Qty: 15 PATCH | Refills: 0 | Status: SHIPPED | OUTPATIENT
Start: 2024-08-11 | End: 2024-08-11

## 2024-08-11 RX ADMIN — KETOROLAC TROMETHAMINE 15 MG: 30 INJECTION, SOLUTION INTRAMUSCULAR at 11:08

## 2024-08-11 RX ADMIN — ORPHENADRINE CITRATE 60 MG: 60 INJECTION INTRAMUSCULAR; INTRAVENOUS at 11:08

## 2024-08-11 NOTE — DISCHARGE INSTRUCTIONS
Please return to the Emergency Department for any new or worsening symptoms including: bladder/bowel incontinence, saddle anesthesia, fever, chest pain, shortness of breath, loss of consciousness, dizziness, weakness, or any other concerns.     Please follow up with your Primary Care Provider within in the week. If you do not have one, you may contact the one listed on your discharge paperwork or you may also call the [facility] Appointment Desk at [phone] to schedule an appointment with one.     Please take all medication as prescribed.

## 2024-08-11 NOTE — ED PROVIDER NOTES
Encounter Date: 8/11/2024       History     Chief Complaint   Patient presents with    Back Pain     Patient presents w/ a c/o left lower back pain for approximately six days. Reports onset after mopping the floor. Worsening pain w/ turning or bending over.      58-year-old female with past medical history of diabetes type 2, hypertension, vitamin-D deficiency presents to ED for emergent evaluation of left-sided back pain that started 6 days ago after she was mopping.  She states that she incidentally noticed the pain while she was mopping.  She denies any direct trauma, fall, head trauma, LOC. she attempted ibuprofen and aspirin cream with transient relief.  She denies any bladder/bowel incontinence, saddle anesthesia, fever, chills, chest pain, shortness of breath, abdominal pain, nausea, vomiting, diarrhea, dysuria, hematuria.  No other symptoms reported.    The history is provided by the patient. No  was used.     Review of patient's allergies indicates:   Allergen Reactions    Ace inhibitors      Other reaction(s): cough     Past Medical History:   Diagnosis Date    DM (diabetes mellitus)     HTN (hypertension)     Obesity, Class II, BMI 35-39.9, with comorbidity 12/19/2016    Vitamin D deficiency disease 03/24/2015     Past Surgical History:   Procedure Laterality Date    CARDIAC SURGERY      Age 3- murmur    COLONOSCOPY N/A 10/19/2018    Procedure: COLONOSCOPY;  Surgeon: Cali De Jesus MD;  Location: [address]);  Service: Endoscopy;  Laterality: N/A;    CYST REMOVAL Right 7/12/2024    Procedure: RIGHT MIDDLE FINGER MUCOUST , CYST EXCISE;  Surgeon: Malini Mai MD;  Location: [facility];  Service: Orthopedics;  Laterality: Right;    HYSTERECTOMY      TLH WITHOUT BSO     Family History   Problem Relation Name Age of Onset    Diabetes Father      COPD Father      Hypertension Father      Hypertension Mother      Hypertension Brother Tomas     No Known Problems Daughter Latoya      No Known Problems Daughter Nancy     Hypertension Brother Layton     No Known Problems Brother Paolo     Glaucoma Neg Hx      Breast cancer Neg Hx      Colon cancer Neg Hx      Ovarian cancer Neg Hx       Social History     Tobacco Use    Smoking status: Never    Smokeless tobacco: Never   Substance Use Topics    Alcohol use: Yes     Comment: ocassionally    Drug use: No     Review of Systems   Constitutional:  Negative for chills and fever.   HENT:  Negative for congestion, ear pain, rhinorrhea and sore throat.    Eyes:  Negative for redness.   Respiratory:  Negative for cough and shortness of breath.    Cardiovascular:  Negative for chest pain.   Gastrointestinal:  Negative for abdominal pain, diarrhea, nausea and vomiting.   Genitourinary:  Negative for decreased urine volume, difficulty urinating, dysuria, frequency, hematuria and urgency.        (-) bladder/bowel incontinence  (-) saddle anesthesia   Musculoskeletal:  Positive for back pain. Negative for neck pain.   Skin:  Negative for rash.   Neurological:  Negative for headaches.        (-) head trauma  (-) LOC   Psychiatric/Behavioral:  Negative for confusion.        Physical Exam     Initial Vitals [08/11/24 1115]   BP Pulse Resp Temp SpO2   (!) 142/93 89 20 98.1 °F (36.7 °C) 98 %      MAP       --         Physical Exam    Nursing note and vitals reviewed.  Constitutional: She appears well-developed and well-nourished.  Non-toxic appearance. She does not appear ill.   HENT:   Head: Normocephalic and atraumatic.   Right Ear: Hearing, tympanic membrane, external ear and ear canal normal. Tympanic membrane is not perforated, not erythematous and not bulging.   Left Ear: Hearing, tympanic membrane, external ear and ear canal normal. Tympanic membrane is not perforated, not erythematous and not bulging.   Nose: Nose normal.   Mouth/Throat: Uvula is midline, oropharynx is clear and moist and mucous membranes are normal.   Eyes: Conjunctivae and EOM are  normal.   Neck: Neck supple.   Normal range of motion.   Full passive range of motion without pain.     Cardiovascular:  Normal rate and regular rhythm.           Pulses:       Radial pulses are 2+ on the right side and 2+ on the left side.   Pulmonary/Chest: Effort normal and breath sounds normal. No accessory muscle usage. No respiratory distress. She has no decreased breath sounds.   Abdominal: Abdomen is soft. Bowel sounds are normal. She exhibits no distension. There is no abdominal tenderness.   No right CVA tenderness.  No left CVA tenderness. There is no rebound and no guarding.   Musculoskeletal:         General: Normal range of motion.      Cervical back: Full passive range of motion without pain, normal range of motion and neck supple. No rigidity.      Comments: Full ROM of neck. No neck rigidity. No midline tenderness to cervical, thoracic, or lumbar spine.  No bony step-offs.  Full range of motion of bilateral upper and lower extremities.  Strength and sensation intact bilateral upper and lower extremities.  Patient able to ambulate into the room.  No erythema, edema, bruising, rash, or cellulitis patient's back.      Neurological: She is alert. No cranial nerve deficit.   Neuro intact.  Strength and sensation intact bilateral upper and lower extremities.   Skin: Skin is warm and dry.   Psychiatric: She has a normal mood and affect.         ED Course   Procedures  Labs Reviewed   POCT GLUCOSE MONITORING CONTINUOUS   POCT GLUCOSE       Result Value    POCT Glucose 106            Imaging Results    None          Medications   orphenadrine injection 60 mg (has no administration in time range)   ketorolac injection 15 mg (has no administration in time range)     Medical Decision Making  This is a 58-year-old female with past medical history of diabetes type 2, hypertension, vitamin-D deficiency presents to ED for emergent evaluation of left-sided back pain that started 6 days ago after she was mopping.   She states that she incidentally noticed the pain while she was mopping.  She denies any direct trauma, fall, head trauma, LOC. she attempted ibuprofen and aspirin cream with transient relief.  She denies any bladder/bowel incontinence, saddle anesthesia, fever, chills, chest pain, shortness of breath, abdominal pain, nausea, vomiting, diarrhea, dysuria, hematuria.  No other symptoms reported.    On physical exam, patient is well-appearing and in no acute distress.  Nontoxic appearing.  Lungs are clear to auscultation bilaterally.  Abdomen is soft and nontender.  No guarding, rigidity, rebound.  2+ radial pulses bilaterally.  Posterior oropharynx is not erythematous.  No edema or exudate.  Uvula midline.  Bilateral tympanic membrane is normal.  No erythema, bulging, or perforations.  Neuro intact.  Strength and sensation intact bilateral upper and lower extremities. Full ROM of neck. No neck rigidity. No midline tenderness to cervical, thoracic, or lumbar spine.  No bony step-offs.  Full range of motion of bilateral upper and lower extremities.  Strength and sensation intact bilateral upper and lower extremities.  Patient able to ambulate into the room.  No erythema, edema, bruising, rash, or cellulitis patient's back.  No CVA tenderness bilaterally.  Point of care glucose is 106.  Patient is ambulating well moving her extremities well.  I do not believe she needs any imaging at this time.  Norflex and Toradol ordered.  Will discharge patient on muscle relaxers, Lidoderm patches, and anti-inflammatories.  Urged prompt follow-up with PCP for further evaluation.    Strict return precautions given. I discussed with the patient/family the diagnosis, treatment plan, indications for return to the emergency department, and for expected follow-up. The patient/family verbalized an understanding. The patient/family is asked if there are any questions or concerns. We discuss the case, until all issues are addressed to the  patient/family's satisfaction. Patient/family understands and is agreeable to the plan. Patient is stable and ready for discharge.      Risk  Prescription drug management.                                      Clinical Impression:  Final diagnoses:  [M54.50] Acute left-sided low back pain without sciatica (Primary)          ED Disposition Condition    Discharge Stable          ED Prescriptions       Medication Sig Dispense Start Date End Date Auth. Provider    naproxen (NAPROSYN) 500 MG tablet Take 1 tablet (500 mg total) by mouth 2 (two) times daily. 20 tablet 8/11/2024 -- Radha Montesinos PA-C    cyclobenzaprine (FLEXERIL) 5 MG tablet Take 1 tablet (5 mg total) by mouth 3 (three) times daily as needed for Muscle spasms. 20 tablet 8/11/2024 8/21/2024 Radha Montesinos PA-C    LIDOcaine (LIDODERM) 5 % (Expires today) Place 1 patch onto the skin once. Remove & Discard patch within 12 hours or as directed by MD for 1 dose 15 patch 8/11/2024 8/11/2024 Radha Montesinos PA-C          Follow-up Information       Follow up With Specialties Details Why Contact Info    Malini Sullivan MD Internal Medicine In 2 days for further evaluation [address]  [place] [address]  [phone]      [facility] ED Emergency Medicine In 2 days If symptoms worsen [address] [facility] [id number] [phone]             Radha Montesinos PA-C  08/11/24 [id number]

## 2024-08-12 ENCOUNTER — CLINICAL SUPPORT (OUTPATIENT)
Dept: REHABILITATION | Facility: HOSPITAL | Age: 59
End: 2024-08-12
Payer: COMMERCIAL

## 2024-08-12 DIAGNOSIS — M25.641 STIFFNESS OF FINGER JOINT OF RIGHT HAND: Primary | ICD-10-CM

## 2024-08-12 DIAGNOSIS — M79.641 RIGHT HAND PAIN: ICD-10-CM

## 2024-08-12 PROCEDURE — 97530 THERAPEUTIC ACTIVITIES: CPT

## 2024-08-12 PROCEDURE — 97110 THERAPEUTIC EXERCISES: CPT

## 2024-08-12 PROCEDURE — 97010 HOT OR COLD PACKS THERAPY: CPT

## 2024-08-12 PROCEDURE — 97140 MANUAL THERAPY 1/> REGIONS: CPT

## 2024-08-12 NOTE — PROGRESS NOTES
[facility]  Occupational Therapy Treatment Note    Date: 8/12/2024  Name: Amanda Ponce  [facility] Number: [id number]    Therapy Diagnosis:   Encounter Diagnoses   Name Primary?    Stiffness of finger joint of right hand Yes    Right hand pain      Physician: Sumi Aguilera NP    Physician Orders: Eval and Treat  Medical Diagnosis: M67.449 (ICD-10-CM) - Ganglion, unspecified hand  Surgical Procedure and Date: 7/12/2024, RIGHT MIDDLE FINGER MUCOUST , CYST EXCISE (Right)   Evaluation Date: 8/6/2024  Insurance Authorization Period Expiration: 8/1/2024 - 12/31/2024   Plan of Care Certification Period: 12 weeks; 10/29/2024  Date of Return to MD: 8/23/2024  Visit # / Visits authorized: 1 / 1  FOTO: 1/ 3     Precautions:  Standard     Time In: 3:30  Time Out: 4:30  Total Billable Time: 60 minutes      SUBJECTIVE     Pt reports:  She was compliant with home exercise program given last session.       Pain: 0/10  Location: right hand    OBJECTIVE   Objective Measures updated at progress report unless specified.    Observation/Appearance:  Wound is dry and in tact. 1 suture was found remaining and removed at this time. Pt was educated on wound care and advised to wait on topical treatments until wound is fully healed.        Edema. Measured in centimeters.    8/6/2024 8/6/2024     Right  Left    Long:         P1 7 6.5    PIP 6.8 6.2   P2 6 5.8    DIP 5.4 5.2   P3          Hand ROM. Measured in degrees.    8/6/2024       Right              Index: MP  /55                PIP     /75                DIP /35                MONGE               Long:  MP /50                PIP /90                DIP /30                MONGE               Ring:   MP /45                PIP /90                DIP /40                MONGE               Small:  MP /35                 PIP /75                 DIP /35                MONGE               Thumb: MP /55                  IP /50         Rad ADD/ABD 40         Pal ADD/ABD 50             Opposition DPC SF        Elbow and Wrist ROM. Measured in degrees.    8/7/2024 8/7/2024     Left Right   Supination/Pronation WFL WFL   Wrist Ext/Flex 65/70 65/70   Wrist RD/UD WFL WFL         Treatment     Amanda received the treatments listed below:     Supervised modalities after being cleared for contradictions: Hot Pack         Manual therapy techniques: Soft tissue Mobilization were applied to the: R MF for 10 minutes, including:  Mild Debridement (NT)  Scar mobilization    Therapeutic exercises to develop ROM for 15 minutes, including:  Wave x10   Hook x10   Flat fist x 10  Composite fist x10   IP blocking x10 ea     Therapeutic activities to improve functional performance for 15  minutes, including:  Isospheres x3 min   Wrist maze x3 min   Wrist PRES with dowel x 3 planes x10 ea   ABC tennis ball x1     Provided elastomer putty today for DIP of MF.   Patient Education and Home Exercises      Education provided:   - HEP  - Progress towards goals     Written Home Exercises Provided: Patient instructed to cont prior HEP.  Exercises were reviewed and Amanda was able to demonstrate them prior to the end of the session.  Amanda demonstrated good  understanding of the HEP provided. See EMR under Patient Instructions for exercises provided during therapy sessions.      ASSESSMENT     Pt able to achieve functional composite fist throughout session with increased attention to full hand flexion. Tightness noted at MF DIP. Pt to continue exercises and normal use of hand for improved functional grasp. Plan to progress as tolerated.     Amanda is progressing  towards her goals and there are no updates to goals at this time. Pt prognosis is Excellent.     Pt will continue to benefit from skilled outpatient occupational therapy to address the deficits listed in the problem list on initial evaluation, provide pt/family education and to maximize pt's level of independence in the home and community environment.     Pt's  spiritual, cultural and educational needs considered and pt agreeable to plan of care and goals.    Anticipated barriers to occupational therapy: none    Goals:   Short Term Goals: (in 4-6 weeks)  1) Patient will be independent in HEP  2) Decrease pain in R hand to no more than 2/10 worst in ADL/IADL's  3) Increase AROM in all R mp joints for improved functioning in ADL/IADL's  4)Assess  strength when appropriate        Long Term Goals: (in 6-8 weeks)  1) Decrease pain in R hand to no more than 1/10 worst in ADL/IADL's  2) Increase AROM of R MF DIP to WFL for increased functioning in ADL/IADL's  3) Increase strength in R hand  to 45 for improved functioning in ADL/IADL's  4) Decrease edema in R hand to trace or none     PLAN     Continue skilled occupational therapy with individualized plan of care focusing on ROM    Aurora Manriquez, OT

## 2024-08-14 ENCOUNTER — CLINICAL SUPPORT (OUTPATIENT)
Dept: REHABILITATION | Facility: HOSPITAL | Age: 59
End: 2024-08-14
Payer: COMMERCIAL

## 2024-08-14 DIAGNOSIS — M79.641 RIGHT HAND PAIN: ICD-10-CM

## 2024-08-14 DIAGNOSIS — M25.641 STIFFNESS OF FINGER JOINT OF RIGHT HAND: Primary | ICD-10-CM

## 2024-08-14 PROCEDURE — 97018 PARAFFIN BATH THERAPY: CPT

## 2024-08-14 PROCEDURE — 97530 THERAPEUTIC ACTIVITIES: CPT

## 2024-08-14 PROCEDURE — 97140 MANUAL THERAPY 1/> REGIONS: CPT

## 2024-08-14 PROCEDURE — 97110 THERAPEUTIC EXERCISES: CPT

## 2024-08-14 NOTE — PROGRESS NOTES
[facility]  Occupational Therapy Treatment Note    Date: 8/14/2024  Name: Amanda Ponce  [facility] Number: [id number]    Therapy Diagnosis:   Encounter Diagnoses   Name Primary?    Stiffness of finger joint of right hand Yes    Right hand pain      Physician: Sumi Aguilera NP    Physician Orders: Eval and Treat  Medical Diagnosis: M67.449 (ICD-10-CM) - Ganglion, unspecified hand  Surgical Procedure and Date: 7/12/2024, RIGHT MIDDLE FINGER MUCOUST , CYST EXCISE (Right)   Evaluation Date: 8/6/2024  Insurance Authorization Period Expiration: 8/1/2024 - 12/31/2024   Plan of Care Certification Period: 12 weeks; 10/29/2024  Date of Return to MD: 8/23/2024  Visit # / Visits authorized: 4 / 10  FOTO: 1/ 3     Precautions:  Standard     Time In: 2:50  Time Out: 3:45  Total Billable Time: 55minutes      SUBJECTIVE     Pt reports: The hand moves really well when I get it warmed up. Stiffens back up when I stop.   She was compliant with home exercise program given last session.       Pain: 0/10  Location: right hand    OBJECTIVE   Objective Measures updated at progress report unless specified.    Observation/Appearance:  Wound is dry and in tact. 1 suture was found remaining and removed at this time. Pt was educated on wound care and advised to wait on topical treatments until wound is fully healed.        Edema. Measured in centimeters.    8/6/2024 8/6/2024     Right  Left    Long:         P1 7 6.5    PIP 6.8 6.2   P2 6 5.8    DIP 5.4 5.2   P3          Hand ROM. Measured in degrees.    8/6/2024       Right              Index: MP  /55                PIP     /75                DIP /35                MONGE               Long:  MP /50                PIP /90                DIP /30                MONGE               Ring:   MP /45                PIP /90                DIP /40                MONGE               Small:  MP /35                 PIP /75                 DIP /35                MONGE                Thumb: MP /55                  IP /50         Rad ADD/ABD 40         Pal ADD/ABD 50            Opposition DPC SF        Elbow and Wrist ROM. Measured in degrees.    8/7/2024 8/7/2024     Left Right   Supination/Pronation WFL WFL   Wrist Ext/Flex 65/70 65/70   Wrist RD/UD WFL WFL         Treatment     Amanda received the treatments listed below:     Supervised modalities after being cleared for contradictions: Paraffin for x10 min w/ hot pack        Manual therapy techniques: Soft tissue Mobilization were applied to the: R MF for 10 minutes, including:  Mild Debridement (NT)  Scar mobilization    Passive extrinsic stretch    Therapeutic exercises to develop ROM for 15 minutes, including:  Towel crunches x2 min   Wave x10   Hook x10   Flat fist x 10  Composite fist x10   IP blocking x10 ea   FA stretch    Therapeutic activities to improve functional performance for 15  minutes, including:  Isospheres x3 min   Wrist maze x3 min   Wrist PRES with dowel x 3 planes x10 ea   ABC tennis ball x1     Provided elastomer putty today for DIP of MF.   Patient Education and Home Exercises      Education provided:   - HEP  - Progress towards goals     Written Home Exercises Provided: Patient instructed to cont prior HEP.  Exercises were reviewed and Amanda was able to demonstrate them prior to the end of the session.  Amanda demonstrated good  understanding of the HEP provided. See EMR under Patient Instructions for exercises provided during therapy sessions.      ASSESSMENT     Pt demo's improving ROM with increased reps. Gentle strengthening initiated. Plan to progress as tolerated.     Amanda is progressing  towards her goals and there are no updates to goals at this time. Pt prognosis is Excellent.     Pt will continue to benefit from skilled outpatient occupational therapy to address the deficits listed in the problem list on initial evaluation, provide pt/family education and to maximize pt's level of independence in the home  and community environment.     Pt's spiritual, cultural and educational needs considered and pt agreeable to plan of care and goals.    Anticipated barriers to occupational therapy: none    Goals:   Short Term Goals: (in 4-6 weeks)  1) Patient will be independent in HEP  2) Decrease pain in R hand to no more than 2/10 worst in ADL/IADL's  3) Increase AROM in all R mp joints for improved functioning in ADL/IADL's  4)Assess  strength when appropriate        Long Term Goals: (in 6-8 weeks)  1) Decrease pain in R hand to no more than 1/10 worst in ADL/IADL's  2) Increase AROM of R MF DIP to WFL for increased functioning in ADL/IADL's  3) Increase strength in R hand  to 45 for improved functioning in ADL/IADL's  4) Decrease edema in R hand to trace or none     PLAN     Continue skilled occupational therapy with individualized plan of care focusing on ROM    Aurora Manriquez, OT

## 2024-08-20 ENCOUNTER — CLINICAL SUPPORT (OUTPATIENT)
Dept: REHABILITATION | Facility: HOSPITAL | Age: 59
End: 2024-08-20
Payer: COMMERCIAL

## 2024-08-20 DIAGNOSIS — M79.641 RIGHT HAND PAIN: ICD-10-CM

## 2024-08-20 DIAGNOSIS — M25.641 STIFFNESS OF FINGER JOINT OF RIGHT HAND: Primary | ICD-10-CM

## 2024-08-20 PROCEDURE — 97022 WHIRLPOOL THERAPY: CPT

## 2024-08-20 PROCEDURE — 97110 THERAPEUTIC EXERCISES: CPT

## 2024-08-20 PROCEDURE — 97140 MANUAL THERAPY 1/> REGIONS: CPT

## 2024-08-20 PROCEDURE — 97530 THERAPEUTIC ACTIVITIES: CPT

## 2024-08-21 NOTE — PROGRESS NOTES
" [facility]  Occupational Therapy Treatment Note    Date: 8/20/2024  Name: Amanda Ponce  [facility] Number: [id number]    Therapy Diagnosis:   Encounter Diagnoses   Name Primary?    Stiffness of finger joint of right hand Yes    Right hand pain      Physician: Sumi Aguilera NP    Physician Orders: Eval and Treat  Medical Diagnosis: M67.449 (ICD-10-CM) - Ganglion, unspecified hand  Surgical Procedure and Date: 7/12/2024, RIGHT MIDDLE FINGER MUCOUST , CYST EXCISE (Right)   Evaluation Date: 8/6/2024  Insurance Authorization Period Expiration: 8/1/2024 - 12/31/2024   Plan of Care Certification Period: 12 weeks; 10/29/2024  Date of Return to MD: 8/23/2024  Visit # / Visits authorized: 4 / 10  FOTO: 1/ 3     Precautions:  Standard     Time In: 2:00  Time Out: 3:00  Total Billable Time: 60minutes      SUBJECTIVE     Pt reports: "I just keep getting so stiff when I stop moving"  She was compliant with home exercise program given last session.       Pain: 0/10  Location: right hand    OBJECTIVE   Objective Measures updated at progress report unless specified.    Observation/Appearance:  Wound is dry and in tact. 1 suture was found remaining and removed at this time. Pt was educated on wound care and advised to wait on topical treatments until wound is fully healed.        Edema. Measured in centimeters.    8/6/2024 8/6/2024     Right  Left    Long:         P1 7 6.5    PIP 6.8 6.2   P2 6 5.8    DIP 5.4 5.2   P3          Hand ROM. Measured in degrees.    8/6/2024       Right              Index: MP  /55                PIP     /75                DIP /35                MONGE               Long:  MP /50                PIP /90                DIP /30                MONGE               Ring:   MP /45                PIP /90                DIP /40                MONGE               Small:  MP /35                 PIP /75                 DIP /35                MONGE               Thumb: MP /55                  IP " /50         Rad ADD/ABD 40         Pal ADD/ABD 50            Opposition DPC SF        Elbow and Wrist ROM. Measured in degrees.    8/7/2024 8/7/2024     Left Right   Supination/Pronation WFL WFL   Wrist Ext/Flex 65/70 65/70   Wrist RD/UD WFL WFL         Treatment     Amanda received the treatments listed below:     Supervised modalities after being cleared for contradictions: Fluido for x10 min w/ hot pack        Manual therapy techniques: Soft tissue Mobilization were applied to the: R MF for 10 minutes, including:  Mild Debridement (NT)  Scar mobilization  Ktape; flexor facilitation  Passive extrinsic stretch    Therapeutic exercises to develop ROM for 15 minutes, including:  Towel crunches x2 min (NT)  Wave x10   Hook x10   Flat fist x 10  Composite fist x10   IP blocking x10 ea   FA stretch]  Yellow flexbar x3 planes x10 ea     Therapeutic activities to improve functional performance for 15  minutes, including:  Isospheres x3 min   Wrist maze x3 min   Wrist PRES 1# x 3 planes x10 ea   ABC tennis ball x1       Provided digi sleeve for middle finger    Patient Education and Home Exercises      Education provided:   - HEP  - Progress towards goals     Written Home Exercises Provided: Patient instructed to cont prior HEP.  Exercises were reviewed and Amanda was able to demonstrate them prior to the end of the session.  Amanda demonstrated good  understanding of the HEP provided. See EMR under Patient Instructions for exercises provided during therapy sessions.      ASSESSMENT   ROM is within functional limits when warmed up. Edema noted continues to be present with tenderness at DIP. Plan to progress with gentle strengthening as tolerated to encourage endurance.     Amanda is progressing  towards her goals and there are no updates to goals at this time. Pt prognosis is Excellent.     Pt will continue to benefit from skilled outpatient occupational therapy to address the deficits listed in the problem list on initial  evaluation, provide pt/family education and to maximize pt's level of independence in the home and community environment.     Pt's spiritual, cultural and educational needs considered and pt agreeable to plan of care and goals.    Anticipated barriers to occupational therapy: none    Goals:   Short Term Goals: (in 4-6 weeks)  1) Patient will be independent in HEP  2) Decrease pain in R hand to no more than 2/10 worst in ADL/IADL's  3) Increase AROM in all R mp joints for improved functioning in ADL/IADL's  4)Assess  strength when appropriate        Long Term Goals: (in 6-8 weeks)  1) Decrease pain in R hand to no more than 1/10 worst in ADL/IADL's  2) Increase AROM of R MF DIP to WFL for increased functioning in ADL/IADL's  3) Increase strength in R hand  to 45 for improved functioning in ADL/IADL's  4) Decrease edema in R hand to trace or none     PLAN     Continue skilled occupational therapy with individualized plan of care focusing on ROM    Aurora Manriquez, OT

## 2024-08-22 ENCOUNTER — CLINICAL SUPPORT (OUTPATIENT)
Dept: REHABILITATION | Facility: HOSPITAL | Age: 59
End: 2024-08-22
Payer: COMMERCIAL

## 2024-08-22 DIAGNOSIS — M25.641 STIFFNESS OF FINGER JOINT OF RIGHT HAND: Primary | ICD-10-CM

## 2024-08-22 DIAGNOSIS — M79.641 RIGHT HAND PAIN: ICD-10-CM

## 2024-08-22 PROCEDURE — 97022 WHIRLPOOL THERAPY: CPT

## 2024-08-22 PROCEDURE — 97140 MANUAL THERAPY 1/> REGIONS: CPT

## 2024-08-22 PROCEDURE — 97110 THERAPEUTIC EXERCISES: CPT

## 2024-08-22 PROCEDURE — 97530 THERAPEUTIC ACTIVITIES: CPT

## 2024-08-22 NOTE — PROGRESS NOTES
"08/23/2024  Patient is here today for a postoperative visit.  She has 6 weeks status post right middle finger digital mucous cyst by Dr. Sal Deng on 07/12/2024. Denies any pain, numbness or tingling. States decreased range of motion/stiffness when attempting to make a fist. States she has been going to therapy and get improvement with heat application and better ROM when passively applying. Reports mild swelling at dorsum of right hand and wrist.     08/1/2024  Ms. Ponce is here today for a post-operative visit.  She is 3 weeks status post right middle finger digital mucous cyst excision by Dr. Mai on 07/12/2024. She reports that she is well.  Pain is 0/10.  She is not taking pain medication .  She denies fever, chills, and sweats since the time of the surgery. Denies any numbness or tingling.     Physical exam:    Vitals:    08/23/24 0844   BP: 134/70   Weight: 91.6 kg (201 lb 15.1 oz)   Height: 4' 11" (1.499 m)   PainSc: 0-No pain     Vital signs are stable, patient is afebrile.  Patient is well dressed and well groomed, no acute distress.  Alert and oriented to person, place, and time.  Well healed incision.  No tenderness or swelling along tendon sheath. Tendons intact.  Incision is clean, dry and intact.  There is no erythema or exudate.  There is no sign of any infection. She is NVI. .  Patient not able to make a full composite fist. Mild swelling of multiple MCPs and dorsum right wrist     Assessment:   s/p right middle finger digital mucous cyst excision     Amanda was seen today for swelling and pain.    Diagnoses and all orders for this visit:    Post-operative state  -     Ambulatory referral/consult to Physical/Occupational Therapy; Future    Digital mucous cyst of finger  -     CBC auto differential; Future  -     Sedimentation rate; Future  -     C-reactive protein; Future  -     TERRIE; Future  -     Cyclic citrul peptide antibody, IgG; Future  -     Rheumatoid factor; Future  -     HLA B27 " Antigen; Future  -     Ambulatory referral/consult to Physical/Occupational Therapy; Future    Swelling of joint of right hand  Comments:  MCPs  Orders:  -     Ambulatory referral/consult to Rheumatology; Future  -     methylPREDNISolone (MEDROL DOSEPACK) 4 mg tablet; use as directed    Decreased range of motion of finger of right hand         Plan:    Continue scar massage.  Continue OT and recommended parafin wax in the morning and thereafter to perform more aggressive ROM at home several times a day. Provided patient with antiinflammatory supplemenation.   Vitamin C to help with swelling.  Xray reviewed again from 6/5/2024 prior to surgery and she does demonstrate spurring at DIPJs and  and mild swelling at dorsal MCPs  Will Order autoimmune panel to evaluate for possible RA or seronegative RA or OA flare up.   She will FU in 2 weeks to assess her ROM with Dr. Huang  Patient would like to return to work around the 9/16 08/25/2024  Discussed autoimmune panel with patient via phone conversation at 1000am. CRP and ESR mildly elevated. Pending TERRIE . Recommended a referral to Rheumatolology for evaluation  Will start her on a steroid dose pack to help with her mild swelling. Discussed that when she takes steroid dose pack to take in the morning, with food and to monitor BS. Patient verbalized understanding.   Patient stated to me she has been applying heat and has noticed improvement with ROM        Path reviewed.    Final Pathologic Diagnosis Soft tissue, right middle finger, excision:  - Fragment of benign hyperkeratotic skin  - No cyst lining identified  - Negative for inflammation or malignanc

## 2024-08-23 ENCOUNTER — TELEPHONE (OUTPATIENT)
Dept: ORTHOPEDICS | Facility: CLINIC | Age: 59
End: 2024-08-23
Payer: COMMERCIAL

## 2024-08-23 ENCOUNTER — OFFICE VISIT (OUTPATIENT)
Dept: ORTHOPEDICS | Facility: CLINIC | Age: 59
End: 2024-08-23
Payer: COMMERCIAL

## 2024-08-23 ENCOUNTER — LAB VISIT (OUTPATIENT)
Dept: LAB | Facility: OTHER | Age: 59
End: 2024-08-23
Payer: COMMERCIAL

## 2024-08-23 VITALS
WEIGHT: 201.94 LBS | BODY MASS INDEX: 40.71 KG/M2 | HEIGHT: 59 IN | DIASTOLIC BLOOD PRESSURE: 70 MMHG | SYSTOLIC BLOOD PRESSURE: 134 MMHG

## 2024-08-23 DIAGNOSIS — M67.449 DIGITAL MUCOUS CYST OF FINGER: ICD-10-CM

## 2024-08-23 DIAGNOSIS — M25.641 DECREASED RANGE OF MOTION OF FINGER OF RIGHT HAND: ICD-10-CM

## 2024-08-23 DIAGNOSIS — Z98.890 POST-OPERATIVE STATE: Primary | ICD-10-CM

## 2024-08-23 DIAGNOSIS — M25.441 SWELLING OF JOINT OF RIGHT HAND: ICD-10-CM

## 2024-08-23 LAB
BASOPHILS # BLD AUTO: 0.01 K/UL (ref 0–0.2)
BASOPHILS NFR BLD: 0.2 % (ref 0–1.9)
CCP AB SER IA-ACNC: <0.5 U/ML
CRP SERPL-MCNC: 24.9 MG/L (ref 0–8.2)
DIFFERENTIAL METHOD BLD: NORMAL
EOSINOPHIL # BLD AUTO: 0.5 K/UL (ref 0–0.5)
EOSINOPHIL NFR BLD: 7.4 % (ref 0–8)
ERYTHROCYTE [DISTWIDTH] IN BLOOD BY AUTOMATED COUNT: 14.3 % (ref 11.5–14.5)
ERYTHROCYTE [SEDIMENTATION RATE] IN BLOOD BY PHOTOMETRIC METHOD: 76 MM/HR (ref 0–36)
HCT VFR BLD AUTO: 40.6 % (ref 37–48.5)
HGB BLD-MCNC: 13.2 G/DL (ref 12–16)
IMM GRANULOCYTES # BLD AUTO: 0.02 K/UL (ref 0–0.04)
IMM GRANULOCYTES NFR BLD AUTO: 0.3 % (ref 0–0.5)
LYMPHOCYTES # BLD AUTO: 1.9 K/UL (ref 1–4.8)
LYMPHOCYTES NFR BLD: 31 % (ref 18–48)
MCH RBC QN AUTO: 28.6 PG (ref 27–31)
MCHC RBC AUTO-ENTMCNC: 32.5 G/DL (ref 32–36)
MCV RBC AUTO: 88 FL (ref 82–98)
MONOCYTES # BLD AUTO: 0.6 K/UL (ref 0.3–1)
MONOCYTES NFR BLD: 9 % (ref 4–15)
NEUTROPHILS # BLD AUTO: 3.3 K/UL (ref 1.8–7.7)
NEUTROPHILS NFR BLD: 52.1 % (ref 38–73)
NRBC BLD-RTO: 0 /100 WBC
PLATELET # BLD AUTO: 357 K/UL (ref 150–450)
PMV BLD AUTO: 10.1 FL (ref 9.2–12.9)
RBC # BLD AUTO: 4.61 M/UL (ref 4–5.4)
RHEUMATOID FACT SERPL-ACNC: <13 IU/ML (ref 0–15)
WBC # BLD AUTO: 6.23 K/UL (ref 3.9–12.7)

## 2024-08-23 PROCEDURE — 1159F MED LIST DOCD IN RCRD: CPT | Mod: CPTII,S$GLB,,

## 2024-08-23 PROCEDURE — 86038 ANTINUCLEAR ANTIBODIES: CPT

## 2024-08-23 PROCEDURE — 85652 RBC SED RATE AUTOMATED: CPT

## 2024-08-23 PROCEDURE — 3044F HG A1C LEVEL LT 7.0%: CPT | Mod: CPTII,S$GLB,,

## 2024-08-23 PROCEDURE — 1160F RVW MEDS BY RX/DR IN RCRD: CPT | Mod: CPTII,S$GLB,,

## 2024-08-23 PROCEDURE — 36415 COLL VENOUS BLD VENIPUNCTURE: CPT

## 2024-08-23 PROCEDURE — 99999 PR PBB SHADOW E&M-EST. PATIENT-LVL IV: CPT | Mod: PBBFAC,,,

## 2024-08-23 PROCEDURE — 86200 CCP ANTIBODY: CPT

## 2024-08-23 PROCEDURE — 3061F NEG MICROALBUMINURIA REV: CPT | Mod: CPTII,S$GLB,,

## 2024-08-23 PROCEDURE — 3075F SYST BP GE 130 - 139MM HG: CPT | Mod: CPTII,S$GLB,,

## 2024-08-23 PROCEDURE — 86235 NUCLEAR ANTIGEN ANTIBODY: CPT | Mod: 59

## 2024-08-23 PROCEDURE — 3066F NEPHROPATHY DOC TX: CPT | Mod: CPTII,S$GLB,,

## 2024-08-23 PROCEDURE — 86039 ANTINUCLEAR ANTIBODIES (ANA): CPT

## 2024-08-23 PROCEDURE — 3078F DIAST BP <80 MM HG: CPT | Mod: CPTII,S$GLB,,

## 2024-08-23 PROCEDURE — 4010F ACE/ARB THERAPY RXD/TAKEN: CPT | Mod: CPTII,S$GLB,,

## 2024-08-23 PROCEDURE — 85025 COMPLETE CBC W/AUTO DIFF WBC: CPT

## 2024-08-23 PROCEDURE — 86431 RHEUMATOID FACTOR QUANT: CPT

## 2024-08-23 PROCEDURE — 99024 POSTOP FOLLOW-UP VISIT: CPT | Mod: S$GLB,,,

## 2024-08-23 PROCEDURE — 86140 C-REACTIVE PROTEIN: CPT

## 2024-08-23 NOTE — TELEPHONE ENCOUNTER
Spoke c pt. Pt stated that she was in office today and requested a letter be sent in for her disability. Advised pt that I see the letter, but cannot confirm if the letter was sent. Advised pt that I would look into this this afternoon and call her back first thing Monday, unless she were to hear back from the MA today. Patient verbalized understanding and was thankful.      ----- Message from Annamarie De Dios sent at 8/23/2024  2:45 PM CDT -----  Name of Who is Calling:MARKEL BRONSON [[id number]]                   What is the request in detail:PT wants a call back from the nurse about her paperwork please assist                    Can the clinic reply by [facility]: No                   What Number to Call Back if not in [facility]: [phone]

## 2024-08-25 RX ORDER — METHYLPREDNISOLONE 4 MG/1
TABLET ORAL
Qty: 21 EACH | Refills: 0 | Status: SHIPPED | OUTPATIENT
Start: 2024-08-25 | End: 2024-09-15

## 2024-08-26 ENCOUNTER — CLINICAL SUPPORT (OUTPATIENT)
Dept: REHABILITATION | Facility: HOSPITAL | Age: 59
End: 2024-08-26
Payer: COMMERCIAL

## 2024-08-26 ENCOUNTER — TELEPHONE (OUTPATIENT)
Dept: ORTHOPEDICS | Facility: CLINIC | Age: 59
End: 2024-08-26
Payer: COMMERCIAL

## 2024-08-26 DIAGNOSIS — M25.641 STIFFNESS OF FINGER JOINT OF RIGHT HAND: Primary | ICD-10-CM

## 2024-08-26 DIAGNOSIS — M79.641 RIGHT HAND PAIN: ICD-10-CM

## 2024-08-26 LAB
ANA PATTERN 1: NORMAL
ANA SER QL IF: POSITIVE
ANA TITR SER IF: NORMAL {TITER}

## 2024-08-26 PROCEDURE — 97530 THERAPEUTIC ACTIVITIES: CPT

## 2024-08-26 PROCEDURE — 97140 MANUAL THERAPY 1/> REGIONS: CPT

## 2024-08-26 PROCEDURE — 97110 THERAPEUTIC EXERCISES: CPT

## 2024-08-26 PROCEDURE — 97018 PARAFFIN BATH THERAPY: CPT

## 2024-08-26 NOTE — PROGRESS NOTES
"  [facility]  Occupational Therapy Treatment Note    Date: 8/26/2024  Name: Amanda Ponce  [facility] Number: [id number]    Therapy Diagnosis:   Encounter Diagnoses   Name Primary?    Stiffness of finger joint of right hand Yes    Right hand pain      Physician: Sumi Aguilera NP    Physician Orders: Eval and Treat  Medical Diagnosis: M67.449 (ICD-10-CM) - Ganglion, unspecified hand  Surgical Procedure and Date: 7/12/2024, RIGHT MIDDLE FINGER MUCOUST , CYST EXCISE (Right)   Evaluation Date: 8/6/2024  Insurance Authorization Period Expiration: 8/1/2024 - 12/31/2024   Plan of Care Certification Period: 12 weeks; 10/29/2024  Date of Return to MD: 8/23/2024  Visit # / Visits authorized: 6 / 10  FOTO: 1/ 3     Precautions:  Standard     Time In: 3:00  Time Out: 4:00  Total Billable Time: 60minutes      SUBJECTIVE     Pt reports: "its about the same" pt reports she has started a steroid to assist with decreasing the swelling.   She was compliant with home exercise program given last session.       Pain: 0/10  Location: right hand    OBJECTIVE   Objective Measures updated at progress report unless specified.    Observation/Appearance:  Wound is dry and in tact. 1 suture was found remaining and removed at this time. Pt was educated on wound care and advised to wait on topical treatments until wound is fully healed.        Edema. Measured in centimeters.    8/6/2024 8/6/2024     Right  Left    Long:         P1 7 6.5    PIP 6.8 6.2   P2 6 5.8    DIP 5.4 5.2   P3          Hand ROM. Measured in degrees.    8/6/2024       Right              Index: MP  /55                PIP     /75                DIP /35                MONGE               Long:  MP /50                PIP /90                DIP /30                MONGE               Ring:   MP /45                PIP /90                DIP /40                MONGE               Small:  MP /35                 PIP /75                 DIP /35                " MONGE               Thumb: MP /55                  IP /50         Rad ADD/ABD 40         Pal ADD/ABD 50            Opposition DPC SF        Elbow and Wrist ROM. Measured in degrees.    8/7/2024 8/7/2024     Left Right   Supination/Pronation WFL WFL   Wrist Ext/Flex 65/70 65/70   Wrist RD/UD WFL WFL         Treatment     Amanda received the treatments listed below:     Supervised modalities after being cleared for contradictions: Paraffin to R hand for 10 minutes        Manual therapy techniques: Soft tissue Mobilization were applied to the: R MF for 10 minutes, including:    Scar mobilization  Ktape; flexor facilitation  Passive extrinsic stretch    Therapeutic exercises to develop ROM for 15 minutes, including:  Towel crunches x2 min (NT)  Wave x10   Hook x10   Flat fist x 10  Composite fist x10   IP blocking x10 ea   FA stretch]  red flexbar x3 planes x10 ea   Red flex bar isometrics x10 ea   Hand gripper x3'    Therapeutic activities to improve functional performance for 15  minutes, including:  Isospheres x3 min   Wrist maze x3 min   Wrist PRES 2# x 3 planes x10 ea   ABC tennis ball x1   In hand manipulation w/ coins x3 min       Provided digi sleeve for middle finger    Patient Education and Home Exercises      Education provided:   - HEP  - Progress towards goals     Written Home Exercises Provided: Patient instructed to cont prior HEP.  Exercises were reviewed and Amanda was able to demonstrate them prior to the end of the session.  Amanda demonstrated good  understanding of the HEP provided. See EMR under Patient Instructions for exercises provided during therapy sessions.      ASSESSMENT     Pt continues to have stiffness and aching with limited ROM prior to initiating modalities. She responds well to heat and AROM for gains in active flexion. Swelling remains present in all digits and greatest in MF.     Amanda is progressing  towards her goals and there are no updates to goals at this time. Pt prognosis is  Excellent.     Pt will continue to benefit from skilled outpatient occupational therapy to address the deficits listed in the problem list on initial evaluation, provide pt/family education and to maximize pt's level of independence in the home and community environment.     Pt's spiritual, cultural and educational needs considered and pt agreeable to plan of care and goals.    Anticipated barriers to occupational therapy: none    Goals:   Short Term Goals: (in 4-6 weeks)  1) Patient will be independent in HEP  2) Decrease pain in R hand to no more than 2/10 worst in ADL/IADL's  3) Increase AROM in all R mp joints for improved functioning in ADL/IADL's  4)Assess  strength when appropriate        Long Term Goals: (in 6-8 weeks)  1) Decrease pain in R hand to no more than 1/10 worst in ADL/IADL's  2) Increase AROM of R MF DIP to WFL for increased functioning in ADL/IADL's  3) Increase strength in R hand  to 45 for improved functioning in ADL/IADL's  4) Decrease edema in R hand to trace or none     PLAN     Continue skilled occupational therapy with individualized plan of care focusing on ROM    Aurora Manriquez, OT

## 2024-08-26 NOTE — PROGRESS NOTES
" [facility]  Occupational Therapy Treatment Note    Date: 8/22/2024  Name: Amanda Ponce  [facility] Number: [id number]    Therapy Diagnosis:   Encounter Diagnoses   Name Primary?    Stiffness of finger joint of right hand Yes    Right hand pain      Physician: Sumi Aguilera NP    Physician Orders: Eval and Treat  Medical Diagnosis: M67.449 (ICD-10-CM) - Ganglion, unspecified hand  Surgical Procedure and Date: 7/12/2024, RIGHT MIDDLE FINGER MUCOUST , CYST EXCISE (Right)   Evaluation Date: 8/6/2024  Insurance Authorization Period Expiration: 8/1/2024 - 12/31/2024   Plan of Care Certification Period: 12 weeks; 10/29/2024  Date of Return to MD: 8/23/2024  Visit # / Visits authorized: 6 / 10  FOTO: 1/ 3     Precautions:  Standard     Time In: 2:00  Time Out: 3:00  Total Billable Time: 60minutes      SUBJECTIVE     Pt reports: "I just keep getting so stiff when I stop moving"  She was compliant with home exercise program given last session.       Pain: 0/10  Location: right hand    OBJECTIVE   Objective Measures updated at progress report unless specified.    Observation/Appearance:  Wound is dry and in tact. 1 suture was found remaining and removed at this time. Pt was educated on wound care and advised to wait on topical treatments until wound is fully healed.        Edema. Measured in centimeters.    8/6/2024 8/6/2024     Right  Left    Long:         P1 7 6.5    PIP 6.8 6.2   P2 6 5.8    DIP 5.4 5.2   P3          Hand ROM. Measured in degrees.    8/6/2024       Right              Index: MP  /55                PIP     /75                DIP /35                MONGE               Long:  MP /50                PIP /90                DIP /30                MONGE               Ring:   MP /45                PIP /90                DIP /40                MONGE               Small:  MP /35                 PIP /75                 DIP /35                MONGE               Thumb: MP /55                  IP " /50         Rad ADD/ABD 40         Pal ADD/ABD 50            Opposition DPC SF        Elbow and Wrist ROM. Measured in degrees.    8/7/2024 8/7/2024     Left Right   Supination/Pronation WFL WFL   Wrist Ext/Flex 65/70 65/70   Wrist RD/UD WFL WFL         Treatment     Amanda received the treatments listed below:     Supervised modalities after being cleared for contradictions: Fluido for x10 min w/ hot pack        Manual therapy techniques: Soft tissue Mobilization were applied to the: R MF for 10 minutes, including:  Mild Debridement (NT)  Scar mobilization  Ktape; flexor facilitation  Passive extrinsic stretch    Therapeutic exercises to develop ROM for 15 minutes, including:  Towel crunches x2 min (NT)  Wave x10   Hook x10   Flat fist x 10  Composite fist x10   IP blocking x10 ea   FA stretch]  red flexbar x3 planes x10 ea   Red flex bar isometrics x10 ea     Therapeutic activities to improve functional performance for 15  minutes, including:  Isospheres x3 min   Wrist maze x3 min   Wrist PRES 2# x 3 planes x10 ea   ABC tennis ball x1   In hand manipulation w/ coins x3 min       Provided digi sleeve for middle finger    Patient Education and Home Exercises      Education provided:   - HEP  - Progress towards goals     Written Home Exercises Provided: Patient instructed to cont prior HEP.  Exercises were reviewed and Amanda was able to demonstrate them prior to the end of the session.  Amanda demonstrated good  understanding of the HEP provided. See EMR under Patient Instructions for exercises provided during therapy sessions.      ASSESSMENT     Pt wood's good ROM with modalities and exercise. However stiffness and swelling is noted at start of session and in between exercises. Plan to progress with strengthening as tolerated.     Amanda is progressing  towards her goals and there are no updates to goals at this time. Pt prognosis is Excellent.     Pt will continue to benefit from skilled outpatient occupational  therapy to address the deficits listed in the problem list on initial evaluation, provide pt/family education and to maximize pt's level of independence in the home and community environment.     Pt's spiritual, cultural and educational needs considered and pt agreeable to plan of care and goals.    Anticipated barriers to occupational therapy: none    Goals:   Short Term Goals: (in 4-6 weeks)  1) Patient will be independent in HEP  2) Decrease pain in R hand to no more than 2/10 worst in ADL/IADL's  3) Increase AROM in all R mp joints for improved functioning in ADL/IADL's  4)Assess  strength when appropriate        Long Term Goals: (in 6-8 weeks)  1) Decrease pain in R hand to no more than 1/10 worst in ADL/IADL's  2) Increase AROM of R MF DIP to WFL for increased functioning in ADL/IADL's  3) Increase strength in R hand  to 45 for improved functioning in ADL/IADL's  4) Decrease edema in R hand to trace or none     PLAN     Continue skilled occupational therapy with individualized plan of care focusing on ROM    Aurora Manriquez, OT

## 2024-08-27 LAB
ANTI SM ANTIBODY: 0.13 RATIO (ref 0–0.99)
ANTI SM/RNP ANTIBODY: 0.11 RATIO (ref 0–0.99)
ANTI-SM INTERPRETATION: NEGATIVE
ANTI-SM/RNP INTERPRETATION: NEGATIVE
ANTI-SSA ANTIBODY: 0.09 RATIO (ref 0–0.99)
ANTI-SSA INTERPRETATION: NEGATIVE
ANTI-SSB ANTIBODY: 0.14 RATIO (ref 0–0.99)
ANTI-SSB INTERPRETATION: NEGATIVE
DSDNA AB SER-ACNC: NORMAL [IU]/ML

## 2024-08-29 ENCOUNTER — CLINICAL SUPPORT (OUTPATIENT)
Dept: REHABILITATION | Facility: HOSPITAL | Age: 59
End: 2024-08-29
Payer: COMMERCIAL

## 2024-08-29 DIAGNOSIS — M79.641 RIGHT HAND PAIN: ICD-10-CM

## 2024-08-29 DIAGNOSIS — M25.641 STIFFNESS OF FINGER JOINT OF RIGHT HAND: Primary | ICD-10-CM

## 2024-08-29 PROCEDURE — 97140 MANUAL THERAPY 1/> REGIONS: CPT

## 2024-08-29 PROCEDURE — 97110 THERAPEUTIC EXERCISES: CPT

## 2024-08-29 PROCEDURE — 97018 PARAFFIN BATH THERAPY: CPT

## 2024-08-29 PROCEDURE — 97530 THERAPEUTIC ACTIVITIES: CPT

## 2024-08-29 NOTE — PROGRESS NOTES
[facility]  Occupational Therapy Treatment Note    Date: 8/29/2024  Name: Amanda Ponce  [facility] Number: [id number]    Therapy Diagnosis:   Encounter Diagnoses   Name Primary?    Stiffness of finger joint of right hand Yes    Right hand pain      Physician: Sumi Aguilera NP    Physician Orders: Eval and Treat  Medical Diagnosis: M67.449 (ICD-10-CM) - Ganglion, unspecified hand  Surgical Procedure and Date: 7/12/2024, RIGHT MIDDLE FINGER MUCOUST , CYST EXCISE (Right)   Evaluation Date: 8/6/2024  Insurance Authorization Period Expiration: 8/1/2024 - 12/31/2024   Plan of Care Certification Period: 12 weeks; 10/29/2024  Date of Return to MD: 8/23/2024  Visit # / Visits authorized: 6 / 10  FOTO: 1/ 3     Precautions:  Standard     Time In: 2:00  Time Out: 3:00  Total Billable Time: 60minutes      SUBJECTIVE     Pt reports: significant improvement in swelling and motion today. She states she has been diagnosed with RA.   She was compliant with home exercise program given last session.       Pain: 0/10  Location: right hand    OBJECTIVE   Objective Measures updated at progress report unless specified.    Observation/Appearance:  Wound is dry and in tact. 1 suture was found remaining and removed at this time. Pt was educated on wound care and advised to wait on topical treatments until wound is fully healed.        Edema. Measured in centimeters.    8/6/2024 8/6/2024     Right  Left    Long:         P1 7 6.5    PIP 6.8 6.2   P2 6 5.8    DIP 5.4 5.2   P3          Hand ROM. Measured in degrees.    8/6/2024       Right              Index: MP  /55                PIP     /75                DIP /35                MONGE               Long:  MP /50                PIP /90                DIP /30                MONGE               Ring:   MP /45                PIP /90                DIP /40                MONGE               Small:  MP /35                 PIP /75                 DIP /35                MONGE                Thumb: MP /55                  IP /50         Rad ADD/ABD 40         Pal ADD/ABD 50            Opposition DPC SF        Elbow and Wrist ROM. Measured in degrees.    8/7/2024 8/7/2024     Left Right   Supination/Pronation WFL WFL   Wrist Ext/Flex 65/70 65/70   Wrist RD/UD WFL WFL         Treatment     Amanda received the treatments listed below:     Supervised modalities after being cleared for contradictions: Paraffin to R hand for 10 minutes        Manual therapy techniques: Soft tissue Mobilization were applied to the: R MF for 10 minutes, including:    Scar mobilization  Ktape; flexor facilitation  Passive extrinsic stretch    Therapeutic exercises to develop ROM for 15 minutes, including:  Towel crunches x2 min (NT)  Wave x10   Hook x10   Flat fist x 10  Composite fist x10   IP blocking x10 ea   FA stretch]  red flexbar x3 planes x10 ea   Red flex bar isometrics x10 ea   Hand gripper x3'    Therapeutic activities to improve functional performance for 15  minutes, including:  Isospheres x3 min   Wrist maze x3 min   Wrist PRES 2# x 3 planes x10 ea   ABC tennis ball x1   In hand manipulation w/ coins x3 min       Provided digi sleeve for middle finger    Patient Education and Home Exercises      Education provided:   - HEP  - Progress towards goals     Written Home Exercises Provided: Patient instructed to cont prior HEP.  Exercises were reviewed and Amanda was able to demonstrate them prior to the end of the session.  Amanda demonstrated good  understanding of the HEP provided. See EMR under Patient Instructions for exercises provided during therapy sessions.      ASSESSMENT     Pt demo's improved ROM and strength with tolerance to increased reps. Mild fatigue noted toward end of session. Plan to progress and upgrade HEP as tolerated.     Amanda is progressing  towards her goals and there are no updates to goals at this time. Pt prognosis is Excellent.     Pt will continue to benefit from skilled  outpatient occupational therapy to address the deficits listed in the problem list on initial evaluation, provide pt/family education and to maximize pt's level of independence in the home and community environment.     Pt's spiritual, cultural and educational needs considered and pt agreeable to plan of care and goals.    Anticipated barriers to occupational therapy: none    Goals:   Short Term Goals: (in 4-6 weeks)  1) Patient will be independent in HEP  2) Decrease pain in R hand to no more than 2/10 worst in ADL/IADL's  3) Increase AROM in all R mp joints for improved functioning in ADL/IADL's  4)Assess  strength when appropriate        Long Term Goals: (in 6-8 weeks)  1) Decrease pain in R hand to no more than 1/10 worst in ADL/IADL's  2) Increase AROM of R MF DIP to WFL for increased functioning in ADL/IADL's  3) Increase strength in R hand  to 45 for improved functioning in ADL/IADL's  4) Decrease edema in R hand to trace or none     PLAN     Continue skilled occupational therapy with individualized plan of care focusing on ROM    Aurora Manriquez, OT

## 2024-09-03 ENCOUNTER — CLINICAL SUPPORT (OUTPATIENT)
Dept: REHABILITATION | Facility: HOSPITAL | Age: 59
End: 2024-09-03
Payer: COMMERCIAL

## 2024-09-03 DIAGNOSIS — M79.641 RIGHT HAND PAIN: ICD-10-CM

## 2024-09-03 DIAGNOSIS — M25.641 STIFFNESS OF FINGER JOINT OF RIGHT HAND: Primary | ICD-10-CM

## 2024-09-03 PROCEDURE — 97140 MANUAL THERAPY 1/> REGIONS: CPT

## 2024-09-03 PROCEDURE — 97110 THERAPEUTIC EXERCISES: CPT

## 2024-09-03 PROCEDURE — 97018 PARAFFIN BATH THERAPY: CPT

## 2024-09-03 PROCEDURE — 97530 THERAPEUTIC ACTIVITIES: CPT

## 2024-09-03 NOTE — PROGRESS NOTES
[facility]  Occupational Therapy Treatment Note    Date: 9/3/2024  Name: Amanda Ponce  [facility] Number: [id number]    Therapy Diagnosis:   Encounter Diagnoses   Name Primary?    Stiffness of finger joint of right hand Yes    Right hand pain        Physician: Sumi Aguilera NP    Physician Orders: Eval and Treat  Medical Diagnosis: M67.449 (ICD-10-CM) - Ganglion, unspecified hand  Surgical Procedure and Date: 7/12/2024, RIGHT MIDDLE FINGER MUCOUST , CYST EXCISE (Right)   Evaluation Date: 8/6/2024  Insurance Authorization Period Expiration: 8/1/2024 - 12/31/2024   Plan of Care Certification Period: 12 weeks; 10/29/2024  Date of Return to MD: 8/23/2024  Visit # / Visits authorized: 6 / 10  FOTO: 1/ 3     Precautions:  Standard     Time In: 2:30  Time Out: 3:30  Total Billable Time: 60minutes      SUBJECTIVE     Pt reports:  She has recently been diagnosed with rheumatoid arthritis and is curious on what to do if she has a flair up.   She was compliant with home exercise program given last session.       Pain: 0/10  Location: right hand    OBJECTIVE   Objective Measures updated at progress report unless specified.    Observation/Appearance:  Wound is dry and in tact. 1 suture was found remaining and removed at this time. Pt was educated on wound care and advised to wait on topical treatments until wound is fully healed.        Edema. Measured in centimeters.    8/6/2024 8/6/2024     Right  Left    Long:         P1 7 6.5    PIP 6.8 6.2   P2 6 5.8    DIP 5.4 5.2   P3          Hand ROM. Measured in degrees.    8/6/2024       Right              Index: MP  /55                PIP     /75                DIP /35                MONGE               Long:  MP /50                PIP /90                DIP /30                MONGE               Ring:   MP /45                PIP /90                DIP /40                MONGE               Small:  MP /35                 PIP /75                 DIP /35        "         MONGE               Thumb: MP /55                  IP /50         Rad ADD/ABD 40         Pal ADD/ABD 50            Opposition DPC SF        Elbow and Wrist ROM. Measured in degrees.    8/7/2024 8/7/2024     Left Right   Supination/Pronation WFL WFL   Wrist Ext/Flex 65/70 65/70   Wrist RD/UD WFL WFL         Treatment     Amanda received the treatments listed below:     Supervised modalities after being cleared for contradictions: Paraffin to R hand for 10 minutes    Manual therapy techniques: Soft tissue Mobilization were applied to the: R MF for 10 minutes, including:    Scar mobilization  STM to the volar hand and dorsal tip of the effected finger  Ktape; flexor facilitation (NT)  Passive extrinsic stretch    Therapeutic exercises to develop ROM for 15 minutes, including:  Towel crunches x2 min (NT)  Wave x10 (NT due to performance at home)  Hook x10 (NT due to performance at home)  Flat fist x 10 (NT due to performance at home)  MP flexion with Composite fist x 10 with 5" holds  IP blocking x10 ea   FA stretch  red flexbar x3 planes x10 ea   Red flex bar isometrics x10 ea   Hand gripper (silver, rung 1) x 2 container of foam block    Therapeutic activities to improve functional performance for 15 minutes, including:  Isospheres x3 min (NT)  Wrist maze x3 min   Wrist PRES 2# x 3 planes x10 ea   ABC tennis ball x1  (NT)  In hand manipulation w/ coins x3 min (NT)  Education on RA basic education and joint protection strategies to reduce pain during work activities    Provided digi sleeve for middle finger    Patient Education and Home Exercises      Education provided:   - HEP  - Progress towards goals     Written Home Exercises Provided: Patient instructed to cont prior HEP.  Exercises were reviewed and Amanda was able to demonstrate them prior to the end of the session.  Maanda demonstrated good  understanding of the HEP provided. See EMR under Patient Instructions for exercises provided during therapy " sessions.      ASSESSMENT     Pt demo's improved ROM and strength with tolerance to increased reps. Some noted tightness in the volar hand at the base of the effected finger. Plan to progress and upgrade HEP as tolerated. Good verbal confirmation of understanding education resources this date for joint protection strategies.     Amanda is progressing  towards her goals and there are no updates to goals at this time. Pt prognosis is Excellent.     Pt will continue to benefit from skilled outpatient occupational therapy to address the deficits listed in the problem list on initial evaluation, provide pt/family education and to maximize pt's level of independence in the home and community environment.     Pt's spiritual, cultural and educational needs considered and pt agreeable to plan of care and goals.    Anticipated barriers to occupational therapy: none    Goals:   Short Term Goals: (in 4-6 weeks)  1) Patient will be independent in HEP  2) Decrease pain in R hand to no more than 2/10 worst in ADL/IADL's  3) Increase AROM in all R mp joints for improved functioning in ADL/IADL's  4)Assess  strength when appropriate        Long Term Goals: (in 6-8 weeks)  1) Decrease pain in R hand to no more than 1/10 worst in ADL/IADL's  2) Increase AROM of R MF DIP to WFL for increased functioning in ADL/IADL's  3) Increase strength in R hand  to 45 for improved functioning in ADL/IADL's  4) Decrease edema in R hand to trace or none     PLAN     Continue skilled occupational therapy with individualized plan of care focusing on ROM    Sonu dEmond, OT

## 2024-09-04 ENCOUNTER — TELEPHONE (OUTPATIENT)
Dept: ORTHOPEDICS | Facility: CLINIC | Age: 59
End: 2024-09-04
Payer: COMMERCIAL

## 2024-09-04 NOTE — TELEPHONE ENCOUNTER
LVM c pt. Attempting to confirm appt location & time c Dr. Huang  with XR. Requested a call back to the [facility] at [phone] with any questions, concerns or need for a different appointment time.

## 2024-09-05 ENCOUNTER — CLINICAL SUPPORT (OUTPATIENT)
Dept: REHABILITATION | Facility: HOSPITAL | Age: 59
End: 2024-09-05
Payer: COMMERCIAL

## 2024-09-05 DIAGNOSIS — M25.641 STIFFNESS OF FINGER JOINT OF RIGHT HAND: Primary | ICD-10-CM

## 2024-09-05 DIAGNOSIS — M79.641 RIGHT HAND PAIN: ICD-10-CM

## 2024-09-05 PROCEDURE — 97530 THERAPEUTIC ACTIVITIES: CPT

## 2024-09-05 PROCEDURE — 97110 THERAPEUTIC EXERCISES: CPT

## 2024-09-05 PROCEDURE — 97018 PARAFFIN BATH THERAPY: CPT

## 2024-09-05 PROCEDURE — 97140 MANUAL THERAPY 1/> REGIONS: CPT

## 2024-09-05 NOTE — PROGRESS NOTES
[facility]  Occupational Therapy Treatment & Progress Note    Date: 9/5/2024  Name: Amanda Ponce  [facility] Number: [id number]    Therapy Diagnosis:   Encounter Diagnoses   Name Primary?    Stiffness of finger joint of right hand Yes    Right hand pain          Physician: Sumi Aguilera NP    Physician Orders: Eval and Treat  Medical Diagnosis: M67.449 (ICD-10-CM) - Ganglion, unspecified hand  Surgical Procedure and Date: 7/12/2024, RIGHT MIDDLE FINGER MUCOUST , CYST EXCISE (Right)   Evaluation Date: 8/6/2024  Insurance Authorization Period Expiration: 8/1/2024 - 12/31/2024   Plan of Care Certification Period: 12 weeks; 10/29/2024  Date of Return to MD: 8/23/2024  Visit # / Visits authorized: 9 / 10  FOTO: 1/ 3     Precautions:  Standard     Time In: 10:30  Time Out: 11:30  Total Billable Time: 60minutes      SUBJECTIVE     Pt reports:  She has utilized some of the joint protection strategies and understands the educational materials she received last session.   She was compliant with home exercise program given last session.     Pain: 0/10  Location: right hand    OBJECTIVE   Objective Measures updated at progress report unless specified.    Observation/Appearance:  Wound is dry and in tact. 1 suture was found remaining and removed at this time. Pt was educated on wound care and advised to wait on topical treatments until wound is fully healed.        Edema. Measured in centimeters.    8/6/2024 8/6/2024 9/5/2024     Right  Left  Right   Long:          P1 7 6.5 6.5    PIP 6.8 6.2 6.3   P2 6 5.8 5.8    DIP 5.4 5.2 6.3   P3     6      Hand ROM. Measured in degrees.    8/6/2024 9/5/2024     Right              Index: MP  /55  0/68              PIP     /75  0/102              DIP /35  0/46              MONGE  165  216           Long:  MP /50  0/75              PIP /90  0/88              DIP /30  0/38              MONGE 170  201            Ring:   MP /45 0/70              PIP /90  0/100         "      DIP /40  0/47              MONGE  185 217            Small:  MP /35 0/80                PIP /75  0/95               DIP /35 0/58               MONGE  145  233           Thumb: MP /55  0/57                IP /50  0/60       Rad ADD/ABD 40  WFL       Pal ADD/ABD 50  WFL          Opposition DPC SF  WFL      Elbow and Wrist ROM. Measured in degrees.    8/7/2024 8/7/2024     Left Right   Supination/Pronation WFL WFL   Wrist Ext/Flex 65/70 65/70   Wrist RD/UD WFL WFL        Strength: (Measured in psi)     Left Right   Rung II 58; 56; 45  AVG = 53 25; 30; 32  AVG = 29      Treatment     Amanda received the treatments listed below:     Supervised modalities after being cleared for contradictions: Paraffin to R hand for 10 minutes    Manual therapy techniques: Soft tissue Mobilization were applied to the: R MF for 10 minutes, including:    Scar mobilization  STM to the volar hand and dorsal tip of the effected finger  Ktape; flexor facilitation (NT)  Passive extrinsic stretch    Therapeutic exercises to develop ROM for 15 minutes, including:  Towel crunches x2 min (NT)  Wave x10 (NT due to performance at home)  Hook x10 (NT due to performance at home)  Flat fist x 10 (NT due to performance at home)  MP flexion with Composite fist x 10 with 5" holds  IP blocking x10 ea   FA stretch  red flexbar x3 planes x10 ea   Red flex bar isometrics x10 ea   Hand gripper (silver, rung 1) x 2 container of foam block (NT)    Therapeutic activities to improve functional performance for 15 minutes, including:  Isospheres x3 min   Wrist maze x3 min   Wrist PRES 2# x 3 planes x10 ea   ABC tennis ball x1  (NT)  In hand manipulation w/ coins x3 min (NT)  Education on RA basic education and joint protection strategies to reduce pain during work activities  - education on edema management at home  -objective measurements updated this date    Provided Large digi sleeve for middle finger    Patient Education and Home Exercises      Education " provided:   - HEP  - Progress towards goals     Written Home Exercises Provided: Patient instructed to cont prior HEP.  Exercises were reviewed and Amanda was able to demonstrate them prior to the end of the session.  Amanda demonstrated good  understanding of the HEP provided. See EMR under Patient Instructions for exercises provided during therapy sessions.      ASSESSMENT     Pt demo's improved ROM and strength with tolerance to increased reps. Some noted tightness in the volar hand at the base of the effected finger. Plan to progress and upgrade HEP as tolerated. Increased range of motion this date. Good progression to perform strengthening measurements. Good verbal confirmation of understanding education resources this date for joint protection strategies.     Amanda is progressing  towards her goals and there are no updates to goals at this time. Pt prognosis is Excellent.     Pt will continue to benefit from skilled outpatient occupational therapy to address the deficits listed in the problem list on initial evaluation, provide pt/family education and to maximize pt's level of independence in the home and community environment.     Pt's spiritual, cultural and educational needs considered and pt agreeable to plan of care and goals.    Anticipated barriers to occupational therapy: none    Goals:   Short Term Goals: (in 4-6 weeks)  1) Patient will be independent in HEP  2) Decrease pain in R hand to no more than 2/10 worst in ADL/IADL's  3) Increase AROM in all R mp joints for improved functioning in ADL/IADL's  4)Assess  strength when appropriate        Long Term Goals: (in 6-8 weeks)  1) Decrease pain in R hand to no more than 1/10 worst in ADL/IADL's  2) Increase AROM of R MF DIP to WFL for increased functioning in ADL/IADL's  3) Increase strength in R hand  to 45 for improved functioning in ADL/IADL's  4) Decrease edema in R hand to trace or none     PLAN     Continue skilled occupational therapy with  individualized plan of care focusing on FAISAL Edmond, OT

## 2024-09-09 ENCOUNTER — CLINICAL SUPPORT (OUTPATIENT)
Dept: REHABILITATION | Facility: HOSPITAL | Age: 59
End: 2024-09-09
Payer: COMMERCIAL

## 2024-09-09 DIAGNOSIS — M79.641 RIGHT HAND PAIN: ICD-10-CM

## 2024-09-09 DIAGNOSIS — M25.641 STIFFNESS OF FINGER JOINT OF RIGHT HAND: Primary | ICD-10-CM

## 2024-09-09 PROCEDURE — 97018 PARAFFIN BATH THERAPY: CPT

## 2024-09-09 PROCEDURE — 97110 THERAPEUTIC EXERCISES: CPT

## 2024-09-09 PROCEDURE — 97530 THERAPEUTIC ACTIVITIES: CPT

## 2024-09-09 PROCEDURE — 97140 MANUAL THERAPY 1/> REGIONS: CPT

## 2024-09-09 NOTE — PROGRESS NOTES
[facility]  Occupational Therapy Treatment & Discharge Note    Date: 9/9/2024  Name: Amanda Ponce  [facility] Number: [id number]    Therapy Diagnosis:   Encounter Diagnoses   Name Primary?    Stiffness of finger joint of right hand Yes    Right hand pain          Physician: Sumi Aguilera NP    Physician Orders: Eval and Treat  Medical Diagnosis: M67.449 (ICD-10-CM) - Ganglion, unspecified hand  Surgical Procedure and Date: 7/12/2024, RIGHT MIDDLE FINGER MUCOUST , CYST EXCISE (Right)   Evaluation Date: 8/6/2024  Insurance Authorization Period Expiration: 8/1/2024 - 12/31/2024   Plan of Care Certification Period: 12 weeks; 10/29/2024  Date of Return to MD: 8/23/2024  Visit # / Visits authorized: 10 / 10  FOTO: 1/ 3     Precautions:  Standard     Time In: 10:30  Time Out: 11:30  Total Billable Time: 60minutes      SUBJECTIVE     Pt reports:  She has utilized some of the joint protection strategies and understands the educational materials she received last session.   She was compliant with home exercise program given last session.     Pain: 0/10  Location: right hand    OBJECTIVE   Objective Measures updated at progress report unless specified.    Observation/Appearance:  Wound is dry and in tact. 1 suture was found remaining and removed at this time. Pt was educated on wound care and advised to wait on topical treatments until wound is fully healed.        Edema. Measured in centimeters.    8/6/2024 8/6/2024 9/5/2024     Right  Left  Right   Long:          P1 7 6.5 6.5    PIP 6.8 6.2 6.3   P2 6 5.8 5.8    DIP 5.4 5.2 6.3   P3     6      Hand ROM. Measured in degrees.    8/6/2024 9/5/2024     Right              Index: MP  /55  0/68              PIP     /75  0/102              DIP /35  0/46              MONGE  165  216           Long:  MP /50  0/75              PIP /90  0/88              DIP /30  0/38              MONGE 170  201            Ring:   MP /45 0/70              PIP /90  0/100       "        DIP /40  0/47              MONGE  185 217            Small:  MP /35 0/80                PIP /75  0/95               DIP /35 0/58               MONGE  145  233           Thumb: MP /55  0/57                IP /50  0/60       Rad ADD/ABD 40  WFL       Pal ADD/ABD 50  WFL          Opposition DPC SF  WFL      Elbow and Wrist ROM. Measured in degrees.    8/7/2024 8/7/2024     Left Right   Supination/Pronation WFL WFL   Wrist Ext/Flex 65/70 65/70   Wrist RD/UD WFL WFL        Strength: (Measured in psi) (9/5/2024)    Left Right   Rung II 58; 56; 45  AVG = 53 25; 30; 32  AVG = 29      Treatment     Amanda received the treatments listed below:     Supervised modalities after being cleared for contradictions: Paraffin to R hand for 10 minutes with taping into flexion using coban wrap.                Manual therapy techniques: Soft tissue Mobilization were applied to the: R MF for 10 minutes, including:    Scar mobilization  STM to the volar and dorsal aspects of the effected finger  Ktape; flexor facilitation (NT)  Passive extrinsic and intrinsic stretch    Therapeutic exercises to develop ROM for 15 minutes, including:  Towel crunches x2 min   Wave x10 (NT due to performance at home)  Hook x10 (NT due to performance at home)  Flat fist x 10 (NT due to performance at home)  MP flexion with Composite fist x 10 with 5" holds  IP blocking x10 ea   FA stretch  red flexbar x3 planes x10 ea   Red flex bar isometrics x10 ea   Hand gripper (silver, rung 1) x 2 container of foam block (NT)  Red Digiflex x 15 composite and 15 isolated    Therapeutic activities to improve functional performance for 15 minutes, including:  Isospheres x3 min   Wrist maze x3 min   Wrist PRES 2# x 3 planes x10 ea   ABC tennis ball x1  (NT)  In hand manipulation w/ coins x3 min (NT)  Education on RA basic education and joint protection strategies to reduce pain during work activities  - education on edema management at home with emphasis to utilize " digi sleeves at night      Patient Education and Home Exercises      Education provided:   - HEP  - Progress towards goals     Written Home Exercises Provided: Patient instructed to cont prior HEP.  Exercises were reviewed and Amanda was able to demonstrate them prior to the end of the session.  Amanda demonstrated good  understanding of the HEP provided. See EMR under Patient Instructions for exercises provided during therapy sessions.      ASSESSMENT     Pt demo's improved ROM and strength with tolerance to increased reps. Some noted tightness in the volar hand at the base of the effected finger. Plan to progress and upgrade HEP as tolerated. Increased range of motion this date. Good progression to perform strengthening measurements. Good verbal confirmation of understanding education resources this date for joint protection strategies. The patient is agreeable to D/C from outpatient occupational therapy services following referring MD approval from scheduled visit on 09/10/2024. The patient has met all but one goal for  strength.     Amanda is progressing  towards her goals and there are no updates to goals at this time. Pt prognosis is Excellent.     Pt will continue to benefit from skilled outpatient occupational therapy to address the deficits listed in the problem list on initial evaluation, provide pt/family education and to maximize pt's level of independence in the home and community environment.     Pt's spiritual, cultural and educational needs considered and pt agreeable to plan of care and goals.    Anticipated barriers to occupational therapy: none    Goals:   Short Term Goals: (in 4-6 weeks)  1) Patient will be independent in HEP. MET  2) Decrease pain in R hand to no more than 2/10 worst in ADL/IADL's. MET  3) Increase AROM in all R mp joints for improved functioning in ADL/IADL's. MET  4)Assess  strength when appropriate. MET        Long Term Goals: (in 6-8 weeks)  1) Decrease pain in R hand  to no more than 1/10 worst in ADL/IADL's. MET  2) Increase AROM of R MF DIP to WFL for increased functioning in ADL/IADL's. MET  3) Increase strength in R hand  to 45 for improved functioning in ADL/IADL's. NOT MET  4) Decrease edema in R hand to trace or none. MET    PLAN     D/C from outpatient occupational therapy services    Sonu Edmond OT

## 2024-09-10 ENCOUNTER — OFFICE VISIT (OUTPATIENT)
Dept: ORTHOPEDICS | Facility: CLINIC | Age: 59
End: 2024-09-10
Payer: COMMERCIAL

## 2024-09-10 VITALS — WEIGHT: 201.94 LBS | BODY MASS INDEX: 40.71 KG/M2 | HEIGHT: 59 IN

## 2024-09-10 DIAGNOSIS — M67.449 DIGITAL MUCOUS CYST OF FINGER: Primary | ICD-10-CM

## 2024-09-10 DIAGNOSIS — Z98.890 POST-OPERATIVE STATE: ICD-10-CM

## 2024-09-10 PROCEDURE — 3061F NEG MICROALBUMINURIA REV: CPT | Mod: CPTII,S$GLB,, | Performed by: ORTHOPAEDIC SURGERY

## 2024-09-10 PROCEDURE — 1159F MED LIST DOCD IN RCRD: CPT | Mod: CPTII,S$GLB,, | Performed by: ORTHOPAEDIC SURGERY

## 2024-09-10 PROCEDURE — 99999 PR PBB SHADOW E&M-EST. PATIENT-LVL III: CPT | Mod: PBBFAC,,, | Performed by: ORTHOPAEDIC SURGERY

## 2024-09-10 PROCEDURE — 4010F ACE/ARB THERAPY RXD/TAKEN: CPT | Mod: CPTII,S$GLB,, | Performed by: ORTHOPAEDIC SURGERY

## 2024-09-10 PROCEDURE — 99024 POSTOP FOLLOW-UP VISIT: CPT | Mod: S$GLB,,, | Performed by: ORTHOPAEDIC SURGERY

## 2024-09-10 PROCEDURE — 3066F NEPHROPATHY DOC TX: CPT | Mod: CPTII,S$GLB,, | Performed by: ORTHOPAEDIC SURGERY

## 2024-09-10 PROCEDURE — 3044F HG A1C LEVEL LT 7.0%: CPT | Mod: CPTII,S$GLB,, | Performed by: ORTHOPAEDIC SURGERY

## 2024-09-10 NOTE — PROGRESS NOTES
Patient looks great incisions well healed patient can make a composite fist she is still in therapy twice a week no pain no nail plate deformity incisions well healed patient can follow-up p.r.n.

## 2024-09-23 NOTE — PROGRESS NOTES
"08/23/2024  Patient is here today for a postoperative visit.  She has 6 weeks status post right middle finger digital mucous cyst by Dr. Sal Deng on 07/12/2024. Denies any pain, numbness or tingling. States decreased range of motion/stiffness when attempting to make a fist. States she has been going to therapy and get improvement with heat application and better ROM when passively applying. Reports mild swelling at dorsum of right hand and wrist.     08/1/2024  Ms. Ponce is here today for a post-operative visit.  She is 3 weeks status post right middle finger digital mucous cyst excision by Dr. Mai on 07/12/2024. She reports that she is well.  Pain is 0/10.  She is not taking pain medication .  She denies fever, chills, and sweats since the time of the surgery. Denies any numbness or tingling.     Physical exam:    Vitals:    09/10/24 0905   Weight: 91.6 kg (201 lb 15.1 oz)   Height: 4' 11" (1.499 m)   PainSc: 0-No pain     Vital signs are stable, patient is afebrile.  Patient is well dressed and well groomed, no acute distress.  Alert and oriented to person, place, and time.  Well healed incision.  No tenderness or swelling along tendon sheath. Tendons intact.  Incision is clean, dry and intact.  There is no erythema or exudate.  There is no sign of any infection. She is NVI. .  Patient not able to make a full composite fist. Mild swelling of multiple MCPs and dorsum right wrist     Assessment:   s/p right middle finger digital mucous cyst excision     There are no diagnoses linked to this encounter.       Plan:    Continue scar massage.          Path reviewed.    Final Pathologic Diagnosis Soft tissue, right middle finger, excision:  - Fragment of benign hyperkeratotic skin  - No cyst lining identified  - Negative for inflammation or malignanc            "

## 2024-10-25 ENCOUNTER — LAB VISIT (OUTPATIENT)
Dept: LAB | Facility: HOSPITAL | Age: 59
End: 2024-10-25
Attending: INTERNAL MEDICINE
Payer: COMMERCIAL

## 2024-10-25 ENCOUNTER — OFFICE VISIT (OUTPATIENT)
Dept: RHEUMATOLOGY | Facility: CLINIC | Age: 59
End: 2024-10-25
Payer: COMMERCIAL

## 2024-10-25 VITALS
HEART RATE: 99 BPM | DIASTOLIC BLOOD PRESSURE: 80 MMHG | BODY MASS INDEX: 41.12 KG/M2 | WEIGHT: 203.94 LBS | SYSTOLIC BLOOD PRESSURE: 122 MMHG | HEIGHT: 59 IN

## 2024-10-25 DIAGNOSIS — M19.241 OTHER SECONDARY OSTEOARTHRITIS OF RIGHT HAND: Primary | ICD-10-CM

## 2024-10-25 DIAGNOSIS — M25.441 SWELLING OF JOINT OF RIGHT HAND: ICD-10-CM

## 2024-10-25 LAB
CRP SERPL-MCNC: 17.5 MG/L (ref 0–8.2)
ERYTHROCYTE [SEDIMENTATION RATE] IN BLOOD BY PHOTOMETRIC METHOD: 49 MM/HR (ref 0–36)

## 2024-10-25 PROCEDURE — 85652 RBC SED RATE AUTOMATED: CPT | Performed by: INTERNAL MEDICINE

## 2024-10-25 PROCEDURE — 36415 COLL VENOUS BLD VENIPUNCTURE: CPT | Performed by: INTERNAL MEDICINE

## 2024-10-25 PROCEDURE — 86140 C-REACTIVE PROTEIN: CPT | Performed by: INTERNAL MEDICINE

## 2024-10-25 PROCEDURE — 99999 PR PBB SHADOW E&M-EST. PATIENT-LVL V: CPT | Mod: PBBFAC,,, | Performed by: INTERNAL MEDICINE

## 2024-10-25 RX ORDER — DICLOFENAC SODIUM 10 MG/G
2 GEL TOPICAL 4 TIMES DAILY PRN
Qty: 3 EACH | Refills: 2 | Status: SHIPPED | OUTPATIENT
Start: 2024-10-25

## 2024-10-25 ASSESSMENT — ROUTINE ASSESSMENT OF PATIENT INDEX DATA (RAPID3)
FATIGUE SCORE: 0
WHEN YOU AWAKENED IN THE MORNING OVER THE LAST WEEK, PLEASE INDICATE THE AMOUNT OF TIME IT TAKES UNTIL YOU ARE AS LIMBER AS YOU WILL BE FOR THE DAY: ALL DAY
PAIN SCORE: 2.5
MDHAQ FUNCTION SCORE: 0
TOTAL RAPID3 SCORE: 0.83
PSYCHOLOGICAL DISTRESS SCORE: 0
PATIENT GLOBAL ASSESSMENT SCORE: 0
AM STIFFNESS SCORE: 1, YES

## 2024-10-25 NOTE — PATIENT INSTRUCTIONS
Recheck ESR, CRP today  Ref to OT for further hand therapy to reduce stiffnes  Cont HEP, paraffin  Diclofenac gel !% up to four times daily to right index dip and flexor tendon  If persistent right middle finger flexor tenosynovitis refer to Dr. Mai for injection  Avoid another Medrol Dospak given T2DM  RTC  6 wks

## 2024-10-25 NOTE — PROGRESS NOTES
Subjective:      Patient ID: Amanda Ponce is a 59 y.o. female.    Chief Complaint: swelling of hand    HPI 7/12/24 had excision of mucoid cyst right middle DIP. Wrapped for 3 wks, painless. Removed wrap, diffuse swelling right hand but sowmya dorsal hand mcps not so the fingers. Given Medrol Dospak which helped greatly Remains mildly swollen.  at surgical site, has stiffness of handgrip. Slight lack of full flexion right middle finger. Attended OT until 9/9/24. Saw Dr. Mai just then, released to return to work 9/17. 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 Occ takes ibuprofen 2 occ at night. Only brief am stiffness, relieved by warm water, uses paraffin daily either in am or pm.   No other joint pain or swelling elsewhere     No fever, hair fall, sicca, oral ulcers, serositis, rash, Raynaud's, numbness or tingling  Review of Systems   Constitutional:  Positive for fatigue (8/10). Negative for appetite change, fever and unexpected weight change.   HENT:  Negative for mouth sores.    Eyes:  Negative for visual disturbance.        Dry     Respiratory:  Positive for shortness of breath. Negative for cough and wheezing.    Cardiovascular:  Negative for chest pain and palpitations.   Gastrointestinal:  Negative for abdominal pain, anal bleeding, blood in stool, constipation, diarrhea, nausea and vomiting.   Genitourinary:  Negative for dysuria, frequency and urgency.   Musculoskeletal:  Negative for arthralgias, back pain, gait problem, joint swelling, myalgias, neck pain and neck stiffness.   Skin:  Negative for rash.   Neurological:  Positive for headaches. Negative for weakness and numbness.   Hematological:  Negative for adenopathy. Does not bruise/bleed easily.   Psychiatric/Behavioral:  Positive for sleep disturbance. The patient is not nervous/anxious.         Objective:   /80 (BP Location: Left arm, Patient Position:  "Sitting)   Pulse 99   Ht 4' 11" (1.499 m)   Wt 92.5 kg (203 lb 14.8 oz)   BMI 41.19 kg/m²   Physical Exam   Constitutional: She is oriented to person, place, and time.   HENT:   Head: Normocephalic and atraumatic.   Mouth/Throat: Oropharynx is clear and moist.   Eyes: Conjunctivae are normal.   Neck: No thyromegaly present.   Cardiovascular: Normal rate, regular rhythm and normal heart sounds. Exam reveals no gallop and no friction rub.   No murmur heard.  Pulmonary/Chest: Breath sounds normal. She has no wheezes. She has no rales. She exhibits no tenderness.   Abdominal: Soft. She exhibits no distension and no mass. There is no abdominal tenderness.   Musculoskeletal:      Cervical back: Normal range of motion and neck supple.   Lymphadenopathy:     She has no cervical adenopathy.   Neurological: She is alert and oriented to person, place, and time. She displays normal reflexes. Gait normal.   Nl motor strength UE and LE bilateral     Tender nodular flexor tendon right middle finger @ volar MCP  Tender minimal swelling right middle finger DIP        Latest Reference Range & Units 08/23/24 09:20   WBC 3.90 - 12.70 K/uL 6.23   RBC 4.00 - 5.40 M/uL 4.61   Hemoglobin 12.0 - 16.0 g/dL 13.2   Hematocrit 37.0 - 48.5 % 40.6   MCV 82 - 98 fL 88   MCH 27.0 - 31.0 pg 28.6   MCHC 32.0 - 36.0 g/dL 32.5   RDW 11.5 - 14.5 % 14.3   Platelet Count 150 - 450 K/uL 357   MPV 9.2 - 12.9 fL 10.1   Gran % 38.0 - 73.0 % 52.1   Lymph % 18.0 - 48.0 % 31.0   Mono % 4.0 - 15.0 % 9.0   Eos % 0.0 - 8.0 % 7.4   Basophil % 0.0 - 1.9 % 0.2   Immature Granulocytes 0.0 - 0.5 % 0.3   Gran # (ANC) 1.8 - 7.7 K/uL 3.3   Lymph # 1.0 - 4.8 K/uL 1.9   Mono # 0.3 - 1.0 K/uL 0.6   Eos # 0.0 - 0.5 K/uL 0.5   Baso # 0.00 - 0.20 K/uL 0.01   Immature Grans (Abs) 0.00 - 0.04 K/uL 0.02   nRBC 0 /100 WBC 0   Differential Method  Automated   Sed Rate 0 - 36 mm/Hr 76 (H)   CRP 0.0 - 8.2 mg/L 24.9 (H)   TERRIE Screen Negative <1:80  Positive !   TERRIE Titer 1  1:160 " "  TERRIE PATTERN 1  Homogeneous   ds DNA Ab Negative 1:10  Negative 1:10   Anti-SSA Antibody 0.00 - 0.99 Ratio 0.09   Anti-SSA Interpretation Negative  Negative   Anti-SSB Antibody 0.00 - 0.99 Ratio 0.14   Anti-SSB Interpretation Negative  Negative   Anti Sm Antibody 0.00 - 0.99 Ratio 0.13   Anti-Sm Interpretation Negative  Negative   Anti Sm/RNP Antibody 0.00 - 0.99 Ratio 0.11   Anti-Sm/RNP Interpretation Negative  Negative   CCP Antibodies <5.0 U/mL <0.5   Rheumatoid Factor 0.0 - 15.0 IU/mL <13.0   B27 Testing Date  09/16/2024 01:12 PM   HLA B27 Interpretation  TAQ: [id number]  SAPE: 222     HLA B27 Result  Negative   (H): Data is abnormally high  !: Data is abnormal   Narrative & Impression  EXAMINATION:  XR KNEE 3 VIEW BILATERAL     CLINICAL HISTORY:  Pain in right knee     TECHNIQUE:  AP, lateral, and Merchant views of both knees were performed.     COMPARISON:  None     FINDINGS:  Mild moderate tricompartment DJD changes particularly left medial compartment and patellofemoral joints.  No fracture dislocation or joint effusion.  Tiny remote calcification overlies proximal cortex medial spinous processes tibial plateau, "3rd tibial spine ", variant of normal.  Some genu valgum on right.     Impression:     No fracture, dislocation or joint effusion.  Additional findings above.        Electronically signed by:Pedro Sanchez MD  Date:                                            05/22/2024  Time:                                           11:10    Narrative & Impression  EXAMINATION:  XR HAND COMPLETE 3 VIEW RIGHT     CLINICAL HISTORY:  Pain in right hand     TECHNIQUE:  PA, lateral, and oblique views of the right hand were performed.     COMPARISON:  None     FINDINGS:  No acute fracture.  Preserved bone density.  Minimal osteoarthritic spurring seen at the 1st carpal metacarpal, thumb IP, and a few of the finger IP articulations most noticeably 3rd DIP articulation.  No erosive or inflammatory arthropathic changes.  " Question mild soft tissue swelling dorsally at the level of the MCP articulations less so metacarpals.     Impression:     As above        Electronically signed by:Lance Gar  Date:                                            06/05/2024  Time:                                           15:42  Assessment:     1. Swelling of joint of right hand    Post excision of right  middle finger mucoid cyst 7/12/24 with diffuse hand swelling noted 3 wks later when wrap removed. Only stiffness, not so much pain. Much improved but with residual stiffness on hand   Exam shows tenderness right middle dip surgery site, and also right middle finger flexor tenosynovitis.   ? CRPS now nearly resolved  No clinical, lab or imaging evidence inflammatory arthropathy  Markedly elevated ESR, CRP 8/23/24  TERRIE+ 1:160  neg profile no clinical features SLE or other autoimmune disorder  Class 3 obesity Body mass index is 41.19 kg/m². On Rybelsus(semaglutide) 14mg daily  Type 2 DM   HbA1c 6.6 4/13/24  EH  122/80 on amlodipine 10mg daily  losartan 100mg daily   Hyperlipidemia   LDL         on atorvastatin 20mg daily  Vitamin D deficiency    on vitamin D3  2000 IU daily   Problem List Items Addressed This Visit    None  Visit Diagnoses       Swelling of joint of right hand        MCPs        Recheck ESR, CRP today  Ref to OT for further hand therapy to reduce stiffnes  Cont HEP, paraffin  Diclofenac gel !% up to four times daily to right index dip and flexor tendon  If persistent right middle finger flexor tenosynovitis refer to Dr. Mai for injection  Avoid another Medrol Dospak given T2DM  RTC  6 wks

## 2024-10-28 ENCOUNTER — CLINICAL SUPPORT (OUTPATIENT)
Dept: REHABILITATION | Facility: HOSPITAL | Age: 59
End: 2024-10-28
Attending: INTERNAL MEDICINE
Payer: COMMERCIAL

## 2024-10-28 DIAGNOSIS — M19.241 OTHER SECONDARY OSTEOARTHRITIS OF RIGHT HAND: Primary | ICD-10-CM

## 2024-10-28 PROCEDURE — 97530 THERAPEUTIC ACTIVITIES: CPT | Mod: PN

## 2024-10-28 PROCEDURE — 97168 OT RE-EVAL EST PLAN CARE: CPT | Mod: PN

## 2024-11-08 ENCOUNTER — HOSPITAL ENCOUNTER (OUTPATIENT)
Dept: RADIOLOGY | Facility: HOSPITAL | Age: 59
Discharge: HOME | End: 2024-11-08
Attending: INTERNAL MEDICINE
Payer: COMMERCIAL

## 2024-11-08 DIAGNOSIS — Z12.31 SCREENING MAMMOGRAM, ENCOUNTER FOR: ICD-10-CM

## 2024-11-08 PROCEDURE — 77067 SCR MAMMO BI INCL CAD: CPT | Mod: TC,PO

## 2024-11-08 PROCEDURE — 77063 BREAST TOMOSYNTHESIS BI: CPT | Mod: 26,,, | Performed by: RADIOLOGY

## 2024-11-08 PROCEDURE — 77067 SCR MAMMO BI INCL CAD: CPT | Mod: 26,,, | Performed by: RADIOLOGY

## 2024-11-16 ENCOUNTER — LAB VISIT (OUTPATIENT)
Dept: LAB | Facility: HOSPITAL | Age: 59
End: 2024-11-16
Attending: INTERNAL MEDICINE
Payer: COMMERCIAL

## 2024-11-16 DIAGNOSIS — E11.29 TYPE 2 DIABETES MELLITUS WITH MICROALBUMINURIA, WITHOUT LONG-TERM CURRENT USE OF INSULIN: ICD-10-CM

## 2024-11-16 DIAGNOSIS — R80.9 TYPE 2 DIABETES MELLITUS WITH MICROALBUMINURIA, WITHOUT LONG-TERM CURRENT USE OF INSULIN: ICD-10-CM

## 2024-11-16 LAB
ESTIMATED AVG GLUCOSE: 140 MG/DL (ref 68–131)
HBA1C MFR BLD: 6.5 % (ref 4–5.6)

## 2024-11-16 PROCEDURE — 36415 COLL VENOUS BLD VENIPUNCTURE: CPT | Mod: PO | Performed by: INTERNAL MEDICINE

## 2024-11-16 PROCEDURE — 83036 HEMOGLOBIN GLYCOSYLATED A1C: CPT | Performed by: INTERNAL MEDICINE

## 2024-11-22 ENCOUNTER — OFFICE VISIT (OUTPATIENT)
Dept: INTERNAL MEDICINE | Facility: CLINIC | Age: 59
End: 2024-11-22
Payer: COMMERCIAL

## 2024-11-22 ENCOUNTER — OFFICE VISIT (OUTPATIENT)
Dept: RHEUMATOLOGY | Facility: CLINIC | Age: 59
End: 2024-11-22
Payer: COMMERCIAL

## 2024-11-22 VITALS
HEIGHT: 59 IN | HEART RATE: 99 BPM | SYSTOLIC BLOOD PRESSURE: 130 MMHG | WEIGHT: 201.94 LBS | BODY MASS INDEX: 40.71 KG/M2 | DIASTOLIC BLOOD PRESSURE: 80 MMHG

## 2024-11-22 VITALS
HEIGHT: 59 IN | DIASTOLIC BLOOD PRESSURE: 80 MMHG | SYSTOLIC BLOOD PRESSURE: 130 MMHG | BODY MASS INDEX: 40.72 KG/M2 | WEIGHT: 202 LBS

## 2024-11-22 DIAGNOSIS — E11.59 HYPERTENSION ASSOCIATED WITH DIABETES: ICD-10-CM

## 2024-11-22 DIAGNOSIS — E11.69 HYPERLIPIDEMIA ASSOCIATED WITH TYPE 2 DIABETES MELLITUS: ICD-10-CM

## 2024-11-22 DIAGNOSIS — I10 ESSENTIAL HYPERTENSION: ICD-10-CM

## 2024-11-22 DIAGNOSIS — R80.9 TYPE 2 DIABETES MELLITUS WITH MICROALBUMINURIA, WITHOUT LONG-TERM CURRENT USE OF INSULIN: Primary | ICD-10-CM

## 2024-11-22 DIAGNOSIS — E11.29 TYPE 2 DIABETES MELLITUS WITH MICROALBUMINURIA, WITHOUT LONG-TERM CURRENT USE OF INSULIN: Primary | ICD-10-CM

## 2024-11-22 DIAGNOSIS — G47.30 SLEEP APNEA, UNSPECIFIED TYPE: ICD-10-CM

## 2024-11-22 DIAGNOSIS — M25.641 FINGER STIFFNESS, RIGHT: ICD-10-CM

## 2024-11-22 DIAGNOSIS — M65.949 FLEXOR TENOSYNOVITIS OF FINGER: Primary | ICD-10-CM

## 2024-11-22 DIAGNOSIS — E78.2 MIXED HYPERLIPIDEMIA: ICD-10-CM

## 2024-11-22 DIAGNOSIS — I15.2 HYPERTENSION ASSOCIATED WITH DIABETES: ICD-10-CM

## 2024-11-22 DIAGNOSIS — E66.01 MORBID OBESITY WITH BMI OF 40.0-44.9, ADULT: ICD-10-CM

## 2024-11-22 DIAGNOSIS — E78.5 HYPERLIPIDEMIA ASSOCIATED WITH TYPE 2 DIABETES MELLITUS: ICD-10-CM

## 2024-11-22 DIAGNOSIS — E55.9 VITAMIN D DEFICIENCY DISEASE: ICD-10-CM

## 2024-11-22 DIAGNOSIS — R76.8 POSITIVE ANA (ANTINUCLEAR ANTIBODY): ICD-10-CM

## 2024-11-22 PROBLEM — M79.641 RIGHT HAND PAIN: Status: RESOLVED | Noted: 2024-08-07 | Resolved: 2024-11-22

## 2024-11-22 PROCEDURE — 99999 PR PBB SHADOW E&M-EST. PATIENT-LVL IV: CPT | Mod: PBBFAC,,, | Performed by: INTERNAL MEDICINE

## 2024-11-22 PROCEDURE — 99999 PR PBB SHADOW E&M-EST. PATIENT-LVL V: CPT | Mod: PBBFAC,,, | Performed by: INTERNAL MEDICINE

## 2024-11-22 RX ORDER — SEMAGLUTIDE 0.68 MG/ML
0.5 INJECTION, SOLUTION SUBCUTANEOUS
Qty: 3 ML | Refills: 1 | Status: SHIPPED | OUTPATIENT
Start: 2024-11-22

## 2024-11-22 RX ORDER — LOSARTAN POTASSIUM 100 MG/1
100 TABLET ORAL DAILY
Qty: 90 TABLET | Refills: 3 | Status: SHIPPED | OUTPATIENT
Start: 2024-11-22

## 2024-11-22 RX ORDER — ATORVASTATIN CALCIUM 40 MG/1
40 TABLET, FILM COATED ORAL DAILY
Qty: 90 TABLET | Refills: 3 | Status: SHIPPED | OUTPATIENT
Start: 2024-11-22

## 2024-11-22 RX ORDER — AMLODIPINE BESYLATE 10 MG/1
10 TABLET ORAL DAILY
Qty: 90 TABLET | Refills: 3 | Status: SHIPPED | OUTPATIENT
Start: 2024-11-22

## 2024-11-22 RX ORDER — LIDOCAINE 50 MG/G
PATCH TOPICAL
COMMUNITY
Start: 2024-08-11

## 2024-11-22 RX ORDER — ATORVASTATIN CALCIUM 20 MG/1
20 TABLET, FILM COATED ORAL DAILY
Qty: 90 TABLET | Refills: 3 | Status: CANCELLED | OUTPATIENT
Start: 2024-11-22

## 2024-11-22 NOTE — PATIENT INSTRUCTIONS
Recheck ESR, CRP today  Cont HEP, paraffin  Diclofenac gel !% (Voltaren) up to four times daily to right index pip and  dip and flexor tendon  If persistent right middle finger flexor tenosynovitis refer to Dr. Mai for injection right middle finger flexor tenosynovitis   Avoid another Medrol Dospak given T2DM  New Covid vacine  RTC  3 months

## 2024-11-22 NOTE — PROGRESS NOTES
Patient ID: Amanda Ponce is a 59 y.o. female.    Chief Complaint: Follow-up      Assessment:       1. Type 2 diabetes mellitus with microalbuminuria, without long-term current use of insulin    2. Essential hypertension    3. Mixed hyperlipidemia    4. Morbid obesity with BMI of 40.0-44.9, adult    5. Sleep apnea, unspecified type    6. Vitamin D deficiency disease    7. Hyperlipidemia associated with type 2 diabetes mellitus    8. Hypertension associated with diabetes    9. Positive TERRIE (antinuclear antibody): 1:160 8/24    10. Finger stiffness, right       Plan:           1. Type 2 diabetes mellitus with microalbuminuria, without long-term current use of insulin:  A1c 6.5.  She is unable to tolerate metformin.  Will do a trial of semaglutide, cautions and side effects reviewed.  Will see if her insurance will pay for this.  -     semaglutide (OZEMPIC) 0.25 mg or 0.5 mg (2 mg/3 mL) pen injector; Inject 0.5 mg into the skin every 7 days.  Dispense: 3 mL; Refill: 1    2. Essential hypertension: Low salt diet, exercise, 15-20-# weight loss in next 6-12 months' time.  Call if BP > 130/80 on a regular basis.  -     amLODIPine (NORVASC) 10 MG tablet; Take 1 tablet (10 mg total) by mouth once daily.  Dispense: 90 tablet; Refill: 3  -     losartan (COZAAR) 100 MG tablet; Take 1 tablet (100 mg total) by mouth once daily.  Dispense: 90 tablet; Refill: 3    3. Mixed hyperlipidemia; increase statin dose, will check labs in 6 months  -     atorvastatin (LIPITOR) 40 MG tablet; Take 1 tablet (40 mg total) by mouth once daily.  Dispense: 90 tablet; Refill: 3    4. Morbid obesity with BMI of 40.0-44.9, adult:  Portion control, exercise, sleep hygiene reviewed.  Not interested in diabetes education or bariatric assessment.  Follow up in 1 month, sooner with problems in the interim  -     semaglutide (OZEMPIC) 0.25 mg or 0.5 mg (2 mg/3 mL) pen injector; Inject 0.5 mg into the skin every 7 days.  Dispense: 3 mL; Refill: 1    5.  Sleep apnea, unspecified type:  Severe per HST 2023.  Referral to sleep Medicine has been placed, she will schedule at her convenience    6. Vitamin D deficiency disease:  Continue with OTC vitamin-D    7. Hyperlipidemia associated with type 2 diabetes mellitus:  See above recommendations    8. Hypertension associated with diabetes:  See above recommendation    9. Positive TERRIE (antinuclear antibody): 1:160 8/24:  Unclear significance.  Keep rheumatology follow-up    10. Finger stiffness, right:  Keep rheumatology and Ortho follow-ups    Visit today manifests increased complexity associated with the care of the multiple chronic and episodic problems I addressed.  I am managing a longitudinal care plan of the patient due to the serious and complex problems listed above.    Patient counseled for over 50% of the 25 minute appt, all questions answered,  chart reviewed and care coordinated.         Subjective:     CHIEF COMPLAINT:  Patient presents today for follow-up on multiple health concerns.    ORTHOPEDIC ISSUES:  She reports ongoing stiffness and pain in her right hand following cyst removal surgery on her right little finger earlier this year. She uses heat therapy for symptom management.  One of her practitioners suggested possible rheumatoid arthritis, elevated sed rate and CRP which are trending downward, with a negative rheumatoid factor.  Antiseizure cc testing was not completed.  She has a positive TERRIE at a very low titer.  Discussed that the etiology of this is unclear still and that a rheumatologic diagnosis may declare itself at a later point.  A one-time steroid treatment provided some relief. She plans to wait the full six months post-surgery as advised by Dr. Mai before considering further interventions, but is open to receiving an injection if symptoms persist. She denies issues with her left hand.  She is seeing Rheumatology today.    DIABETES MANAGEMENT:  She reports good blood sugar control  with an A1c of 6.5. She expresses intolerance to metformin, experiencing diarrhea and significant cramping.   She felt nauseated and uncomfortable when she took Rybelsus but did not take it for very long.  She has some slight interest in injectable diabetes medications such as Ozempic or Mounjaro.  Cautions and side effects were reviewed at length.  Dietary modification, exercise, sleep hygiene and lifestyle modification was also reviewed.    CARDIOVASCULAR HEALTH:  She reports stable blood pressure and is currently taking cholesterol medication. Her most recent cholesterol levels were good, given her diabetes however would recommend getting LDL lower, below 75.  She has been advised to monitor for potential side effects such as muscle cramping and to maintain adequate hydration while on the increased dosage.    WEIGHT MANAGEMENT:  She expresses a desire to lose weight, noting she has never been her current size. She attributes some weight gain to recent surgery and other factors. She is interested in trying a once-weekly injectable medication for diabetes which should also help with weight loss, understanding that it works on both the brain and stomach to maintain a feeling of fullness and potentially reduce food intake. She acknowledges the difficulty of weight loss, especially with the approaching holiday season, but is motivated to make changes.    BREAST HEALTH:  She reports having a follow-up mammogram due to calcifications found on the right side of her breast during initial screening. She expresses some concern about the need for additional imaging.    SLEEP APNEA:  She has not completed the follow-up in the Sleep Medicine Department yet.  She acknowledges her sleep apnea was previously diagnosed as very severe. She understands the connection between sleep apnea, diabetes, and weight, recognizing that weight loss could potentially improve her condition. She expresses willingness to reschedule the sleep  "medicine consultation online or by phone.    Seen six-months for annual exam; following recommendations:     "Importance of CARLOS  discussed at length, needs follow-up in the Sleep Clinic, this will help with weight loss   Resume semaglutide, starting at 7 mg for 1 month and then increasing to 14 mg, to help with both diabetes and weight loss   Ortho for cyst on finger; x-rays of the knees and review, may need physical therapy versus Ortho for the knees   Urine for microalbumin   Prevnar 20 today   One-month follow-up, return sooner with problems in the interim   Mammogram scheduled   She declines COVID booster   She brings in outside records of her external eye exam"    Otherwise up-to-date with screenings and immunizations.  She continues to decline COVID booster.    Mammogram November 2024- needs magnification views  Rheumatology October 2024  Ortho September 2024  Flu shot September 2024  Urine for microalbumin May 2024  Foot exam May 2024  Prevnar 20 May 2024  Eye exam April 2024  Colonoscopy October 2018, 10 year follow-up recommended        ROS:  General: -fever, -chills, -fatigue, -weight gain, -weight loss  Eyes: -vision changes, -redness, -discharge  ENT: -ear pain, -nasal congestion, -sore throat  Cardiovascular: -chest pain, -palpitations, -lower extremity edema  Respiratory: -cough, -shortness of breath  Gastrointestinal: -abdominal pain, -nausea, -vomiting, +diarrhea, -constipation, -blood in stool  Genitourinary: -dysuria, -hematuria, -frequency  Musculoskeletal: +joint pain, -muscle pain  Skin: -rash, -lesion  Neurological: -headache, -dizziness, -numbness, -tingling  Psychiatric: -anxiety, -depression, +sleep difficulty          Patient Active Problem List   Diagnosis    Hyperlipidemia associated with type 2 diabetes mellitus    Vitamin D deficiency disease    Hypertension associated with diabetes    Type 2 diabetes mellitus with microalbuminuria, without long-term current use of insulin    Morbid " obesity with BMI of 40.0-44.9, adult    Limited range of motion (ROM) of shoulder    Sleep apnea: severe per HST 7/23    Digital mucous cyst of finger    Positive TERRIE (antinuclear antibody): 1:160 8/24          Objective:      Physical Exam  Constitutional:       Appearance: She is well-developed.   HENT:      Head: Normocephalic and atraumatic.   Eyes:      Extraocular Movements: Extraocular movements intact.      Conjunctiva/sclera: Conjunctivae normal.   Neck:      Thyroid: No thyromegaly.   Pulmonary:      Effort: No respiratory distress.   Neurological:      General: No focal deficit present.      Mental Status: She is alert and oriented to person, place, and time.   Psychiatric:         Mood and Affect: Mood normal.         Behavior: Behavior normal.         Thought Content: Thought content normal.         Judgment: Judgment normal.             There are no preventive care reminders to display for this patient.       This note was generated with the assistance of ambient listening technology. Verbal consent was obtained by the patient and accompanying visitor(s) for the recording of patient appointment to facilitate this note. I attest to having reviewed and edited the generated note for accuracy, though some syntax or spelling errors may persist. Please contact the author of this note for any clarification.

## 2024-11-22 NOTE — PROGRESS NOTES
"Subjective:      Patient ID: Amanda Ponce is a 59 y.o. female.    Chief Complaint: right hand swelling post op    HPI  no change. Has minimal swelling right middle DIP at surgical location, bony enlargement right middle PIP and tenderness right middle finger flexor tendon. No triggering.  No other joint pain or swelling. ROS completely negative. She has been back to OT x 1  and had instruction on joint protection and exercise, uses paraffin at home. Diclofenac gel not started as told by insurance on "back order"  Review of Systems   Constitutional:  Negative for appetite change, fatigue, fever and unexpected weight change.   HENT:  Negative for mouth sores.    Eyes:  Negative for visual disturbance.   Respiratory:  Negative for cough, shortness of breath and wheezing.    Cardiovascular:  Negative for chest pain and palpitations.   Gastrointestinal:  Negative for abdominal pain, anal bleeding, blood in stool, constipation, diarrhea, nausea and vomiting.   Genitourinary:  Negative for dysuria, frequency and urgency.   Musculoskeletal:  Negative for arthralgias, back pain, gait problem, joint swelling, myalgias, neck pain and neck stiffness.   Skin:  Negative for rash.   Neurological:  Negative for weakness, numbness and headaches.   Hematological:  Negative for adenopathy. Does not bruise/bleed easily.   Psychiatric/Behavioral:  Negative for sleep disturbance. The patient is not nervous/anxious.         Objective:   /80 (Patient Position: Sitting)   Pulse 99   Ht 4' 11" (1.499 m)   Wt 91.6 kg (201 lb 15.1 oz)   BMI 40.79 kg/m²   Physical Exam       Right Side Rheumatological Exam     The patient is tender to palpation of the 3rd MCP    She has swelling of the 3rd DIP    The patient has an enlarged 3rd PIP           10/25/2024   Tender (MCCARTNEY-28) 1 / 28    Swollen (MCCARTNEY-28) 0 / 28    Provider Global 10 / 100   Patient Global 0 / 100   ESR 49 mm/hr   CRP 17.5 mg/L   MCCARTNEY-28 (ESR) 3.28 (Moderate disease activity) "   MCCARTNEY-28 (CRP) 2.57 (Remission)   CDAI Score 2       Latest Reference Range & Units 08/23/24 09:20 10/25/24 14:51   Sed Rate 0 - 36 mm/Hr 76 (H) 49 (H)   (H): Data is abnormally high     Latest Reference Range & Units 08/23/24 09:20 10/25/24 14:51   CRP 0.0 - 8.2 mg/L 24.9 (H) 17.5 (H)   (H): Data is abnormally high     Latest Reference Range & Units 08/23/24 09:20   TERRIE Screen Negative <1:80  Positive !   TERRIE Titer 1  1:160   TERRIE PATTERN 1  Homogeneous   ds DNA Ab Negative 1:10  Negative 1:10   Anti-SSA Antibody 0.00 - 0.99 Ratio 0.09   Anti-SSA Interpretation Negative  Negative   Anti-SSB Antibody 0.00 - 0.99 Ratio 0.14   Anti-SSB Interpretation Negative  Negative   Anti Sm Antibody 0.00 - 0.99 Ratio 0.13   Anti-Sm Interpretation Negative  Negative   Anti Sm/RNP Antibody 0.00 - 0.99 Ratio 0.11   Anti-Sm/RNP Interpretation Negative  Negative   CCP Antibodies <5.0 U/mL <0.5   Rheumatoid Factor 0.0 - 15.0 IU/mL <13.0   !: Data is abnormal       ASSESSMENT:      1. Swelling of joint of right hand    Post excision of right  middle finger mucoid cyst 7/12/24 with diffuse hand swelling noted 3 wks later when wrap removed. Only stiffness, not so much pain. Much improved but with residual stiffness on hand   Exam shows tenderness right middle dip surgery site, and also right middle finger flexor tenosynovitis.   ? CRPS now nearly resolved  No clinical, lab or imaging evidence inflammatory arthropathy  Markedly elevated ESR, CRP 8/23/24  TERRIE+ 1:160  neg profile no clinical features SLE or other autoimmune disorder  Class 3 obesity Body mass index is 41.19 kg/m². On Rybelsus(semaglutide) 14mg daily  Type 2 DM   HbA1c 6.6 4/13/24  EH  122/80 on amlodipine 10mg daily  losartan 100mg daily   Hyperlipidemia   LDL         on atorvastatin 20mg daily  Vitamin D deficiency    on vitamin D3  2000 IU daily           Plan:   Recheck ESR, CRP today  Cont HEP, paraffin  Diclofenac gel !% (Voltaren) up to four times daily to right  index pip and  dip and flexor tendon  Given  persistent right middle finger flexor tenosynovitis refer back to Dr. Mai for injection right middle finger flexor tenosynovitis   Avoid another Medrol Dospak given T2DM  *new Covid vaccine  RTC  3 months

## 2024-12-04 ENCOUNTER — HOSPITAL ENCOUNTER (OUTPATIENT)
Dept: RADIOLOGY | Facility: HOSPITAL | Age: 59
Discharge: HOME | End: 2024-12-04
Attending: INTERNAL MEDICINE
Payer: COMMERCIAL

## 2024-12-04 DIAGNOSIS — R92.8 ABNORMAL MAMMOGRAM OF RIGHT BREAST: ICD-10-CM

## 2024-12-04 PROCEDURE — 77065 DX MAMMO INCL CAD UNI: CPT | Mod: 26,RT,, | Performed by: RADIOLOGY

## 2024-12-04 PROCEDURE — 77061 BREAST TOMOSYNTHESIS UNI: CPT | Mod: 26,RT,, | Performed by: RADIOLOGY

## 2024-12-04 PROCEDURE — 77065 DX MAMMO INCL CAD UNI: CPT | Mod: TC,RT

## 2024-12-23 ENCOUNTER — PATIENT MESSAGE (OUTPATIENT)
Dept: INTERNAL MEDICINE | Facility: CLINIC | Age: 59
End: 2024-12-23
Payer: COMMERCIAL

## 2024-12-30 ENCOUNTER — TELEPHONE (OUTPATIENT)
Dept: INTERNAL MEDICINE | Facility: CLINIC | Age: 59
End: 2024-12-30
Payer: COMMERCIAL

## 2024-12-30 DIAGNOSIS — R80.9 TYPE 2 DIABETES MELLITUS WITH MICROALBUMINURIA, WITHOUT LONG-TERM CURRENT USE OF INSULIN: ICD-10-CM

## 2024-12-30 DIAGNOSIS — E11.29 TYPE 2 DIABETES MELLITUS WITH MICROALBUMINURIA, WITHOUT LONG-TERM CURRENT USE OF INSULIN: ICD-10-CM

## 2024-12-30 DIAGNOSIS — E66.01 MORBID OBESITY WITH BMI OF 40.0-44.9, ADULT: ICD-10-CM

## 2024-12-30 NOTE — TELEPHONE ENCOUNTER
Please call, she has not read her Inez message    How is she doing on Ozempic? Is she ready for us to increase the dose? Please let me know how much weight she has lost thanks

## 2025-01-08 RX ORDER — SEMAGLUTIDE 1.34 MG/ML
1 INJECTION, SOLUTION SUBCUTANEOUS
Qty: 3 ML | Refills: 11 | Status: SHIPPED | OUTPATIENT
Start: 2025-01-08 | End: 2026-01-08

## 2025-01-08 NOTE — TELEPHONE ENCOUNTER
Spoke to pt and she is ready to go up to the next dose , she have lost about 9 lbs   She would like it to be send to the [facility]  Pharmacy updated

## 2025-04-28 ENCOUNTER — TELEPHONE (OUTPATIENT)
Dept: FAMILY MEDICINE | Facility: CLINIC | Age: 60
End: 2025-04-28
Payer: COMMERCIAL

## 2025-04-28 DIAGNOSIS — R80.9 TYPE 2 DIABETES MELLITUS WITH MICROALBUMINURIA, WITHOUT LONG-TERM CURRENT USE OF INSULIN: Primary | ICD-10-CM

## 2025-04-28 DIAGNOSIS — E11.29 TYPE 2 DIABETES MELLITUS WITH MICROALBUMINURIA, WITHOUT LONG-TERM CURRENT USE OF INSULIN: Primary | ICD-10-CM

## 2025-04-28 NOTE — TELEPHONE ENCOUNTER
Please call her, I did refill her meds.  However, she needs labs done in May, orders in, thank you    She needs her annual exam with me around November.   Please schedule    Please ask her if she is planning to have her eye exam done at [facility] this year?

## 2025-04-28 NOTE — TELEPHONE ENCOUNTER
----- Message from Rajani sent at 4/28/2025  8:44 AM CDT -----  Type:  Needs Medical AdviceWho Called: ptWould the patient rather a call back or a response via [facility]? Call Best Call Back Number: [phone] Information: pt requesting a call back fro nurse.

## 2025-04-28 NOTE — TELEPHONE ENCOUNTER
Care Due:                  Date            Visit Type   Department     Provider  --------------------------------------------------------------------------------                                EP -                              PRIMARY      [facility]  Last Visit: 11-      CARE ([facility])   MEDICINE       Malini Sullivan  Next Visit: None Scheduled  None         None Found                                                            Last  Test          Frequency    Reason                     Performed    Due Date  --------------------------------------------------------------------------------    CMP.........  12 months..  atorvastatin, losartan...  04- 04-    HBA1C.......  6 months...  semaglutide..............  11- 05-    Lipid Panel.  12 months..  atorvastatin.............  04- 04-    [facility] Embedded Care Due Messages. Reference number: [id number].   4/28/2025 10:06:02 AM CDT

## 2025-05-01 LAB
LEFT EYE DM RETINOPATHY: NEGATIVE
RIGHT EYE DM RETINOPATHY: NEGATIVE

## 2025-05-07 DIAGNOSIS — E11.9 TYPE 2 DIABETES MELLITUS WITHOUT COMPLICATION, UNSPECIFIED WHETHER LONG TERM INSULIN USE: ICD-10-CM

## 2025-05-17 ENCOUNTER — LAB VISIT (OUTPATIENT)
Dept: LAB | Facility: HOSPITAL | Age: 60
End: 2025-05-17
Attending: INTERNAL MEDICINE
Payer: COMMERCIAL

## 2025-05-17 DIAGNOSIS — E11.29 TYPE 2 DIABETES MELLITUS WITH MICROALBUMINURIA, WITHOUT LONG-TERM CURRENT USE OF INSULIN: ICD-10-CM

## 2025-05-17 DIAGNOSIS — R80.9 TYPE 2 DIABETES MELLITUS WITH MICROALBUMINURIA, WITHOUT LONG-TERM CURRENT USE OF INSULIN: ICD-10-CM

## 2025-05-17 LAB
ALBUMIN SERPL BCP-MCNC: 3.9 G/DL (ref 3.5–5.2)
ALBUMIN/CREAT UR: 4.8 UG/MG
ALP SERPL-CCNC: 99 UNIT/L (ref 40–150)
ALT SERPL W/O P-5'-P-CCNC: 17 UNIT/L (ref 10–44)
ANION GAP (OHS): 10 MMOL/L (ref 8–16)
AST SERPL-CCNC: 17 UNIT/L (ref 11–45)
BILIRUB SERPL-MCNC: 0.4 MG/DL (ref 0.1–1)
BUN SERPL-MCNC: 14 MG/DL (ref 6–20)
CALCIUM SERPL-MCNC: 9.8 MG/DL (ref 8.7–10.5)
CHLORIDE SERPL-SCNC: 105 MMOL/L (ref 95–110)
CHOLEST SERPL-MCNC: 147 MG/DL (ref 120–199)
CHOLEST/HDLC SERPL: 3.1 {RATIO} (ref 2–5)
CO2 SERPL-SCNC: 30 MMOL/L (ref 23–29)
CREAT SERPL-MCNC: 0.7 MG/DL (ref 0.5–1.4)
CREAT UR-MCNC: 147 MG/DL (ref 15–325)
EAG (OHS): 117 MG/DL (ref 68–131)
GFR SERPLBLD CREATININE-BSD FMLA CKD-EPI: >60 ML/MIN/1.73/M2
GLUCOSE SERPL-MCNC: 94 MG/DL (ref 70–110)
HBA1C MFR BLD: 5.7 % (ref 4–5.6)
HDLC SERPL-MCNC: 47 MG/DL (ref 40–75)
HDLC SERPL: 32 % (ref 20–50)
LDLC SERPL CALC-MCNC: 84.2 MG/DL (ref 63–159)
MICROALBUMIN UR-MCNC: 7 UG/ML (ref ?–5000)
NONHDLC SERPL-MCNC: 100 MG/DL
POTASSIUM SERPL-SCNC: 3.7 MMOL/L (ref 3.5–5.1)
PROT SERPL-MCNC: 8.8 GM/DL (ref 6–8.4)
SODIUM SERPL-SCNC: 145 MMOL/L (ref 136–145)
TRIGL SERPL-MCNC: 79 MG/DL (ref 30–150)

## 2025-05-17 PROCEDURE — 80053 COMPREHEN METABOLIC PANEL: CPT

## 2025-05-17 PROCEDURE — 36415 COLL VENOUS BLD VENIPUNCTURE: CPT | Mod: PO

## 2025-05-17 PROCEDURE — 80061 LIPID PANEL: CPT

## 2025-05-17 PROCEDURE — 83036 HEMOGLOBIN GLYCOSYLATED A1C: CPT

## 2025-05-17 PROCEDURE — 82043 UR ALBUMIN QUANTITATIVE: CPT

## 2025-05-18 ENCOUNTER — RESULTS FOLLOW-UP (OUTPATIENT)
Dept: FAMILY MEDICINE | Facility: CLINIC | Age: 60
End: 2025-05-18

## 2025-05-27 DIAGNOSIS — E11.29 TYPE 2 DIABETES MELLITUS WITH MICROALBUMINURIA, WITHOUT LONG-TERM CURRENT USE OF INSULIN: ICD-10-CM

## 2025-05-27 DIAGNOSIS — R80.9 TYPE 2 DIABETES MELLITUS WITH MICROALBUMINURIA, WITHOUT LONG-TERM CURRENT USE OF INSULIN: ICD-10-CM

## 2025-05-27 RX ORDER — SEMAGLUTIDE 2.68 MG/ML
2 INJECTION, SOLUTION SUBCUTANEOUS
Qty: 3 ML | Refills: 0 | Status: SHIPPED | OUTPATIENT
Start: 2025-05-27 | End: 2026-05-27

## 2025-06-19 ENCOUNTER — RESULTS FOLLOW-UP (OUTPATIENT)
Dept: FAMILY MEDICINE | Facility: CLINIC | Age: 60
End: 2025-06-19

## 2025-06-19 ENCOUNTER — HOSPITAL ENCOUNTER (OUTPATIENT)
Dept: RADIOLOGY | Facility: HOSPITAL | Age: 60
Discharge: HOME | End: 2025-06-19
Attending: INTERNAL MEDICINE
Payer: COMMERCIAL

## 2025-06-19 ENCOUNTER — OFFICE VISIT (OUTPATIENT)
Dept: FAMILY MEDICINE | Facility: CLINIC | Age: 60
End: 2025-06-19
Payer: COMMERCIAL

## 2025-06-19 VITALS
HEIGHT: 59 IN | BODY MASS INDEX: 33.26 KG/M2 | DIASTOLIC BLOOD PRESSURE: 70 MMHG | WEIGHT: 165 LBS | SYSTOLIC BLOOD PRESSURE: 125 MMHG

## 2025-06-19 DIAGNOSIS — G89.29 CHRONIC LEFT SHOULDER PAIN: ICD-10-CM

## 2025-06-19 DIAGNOSIS — G47.30 SLEEP APNEA, UNSPECIFIED TYPE: ICD-10-CM

## 2025-06-19 DIAGNOSIS — Z00.00 ANNUAL PHYSICAL EXAM: Primary | ICD-10-CM

## 2025-06-19 DIAGNOSIS — E78.2 MIXED HYPERLIPIDEMIA: ICD-10-CM

## 2025-06-19 DIAGNOSIS — E11.59 HYPERTENSION ASSOCIATED WITH DIABETES: ICD-10-CM

## 2025-06-19 DIAGNOSIS — E78.5 HYPERLIPIDEMIA ASSOCIATED WITH TYPE 2 DIABETES MELLITUS: ICD-10-CM

## 2025-06-19 DIAGNOSIS — Z13.6 ENCOUNTER FOR SCREENING FOR CARDIOVASCULAR DISORDERS: ICD-10-CM

## 2025-06-19 DIAGNOSIS — E66.811 CLASS 1 OBESITY WITH SERIOUS COMORBIDITY AND BODY MASS INDEX (BMI) OF 33.0 TO 33.9 IN ADULT, UNSPECIFIED OBESITY TYPE: ICD-10-CM

## 2025-06-19 DIAGNOSIS — M25.512 CHRONIC LEFT SHOULDER PAIN: ICD-10-CM

## 2025-06-19 DIAGNOSIS — E11.69 HYPERLIPIDEMIA ASSOCIATED WITH TYPE 2 DIABETES MELLITUS: ICD-10-CM

## 2025-06-19 DIAGNOSIS — R77.8 ELEVATED TOTAL PROTEIN: ICD-10-CM

## 2025-06-19 DIAGNOSIS — I15.2 HYPERTENSION ASSOCIATED WITH DIABETES: ICD-10-CM

## 2025-06-19 DIAGNOSIS — R80.9 TYPE 2 DIABETES MELLITUS WITH MICROALBUMINURIA, WITHOUT LONG-TERM CURRENT USE OF INSULIN: ICD-10-CM

## 2025-06-19 DIAGNOSIS — E11.29 TYPE 2 DIABETES MELLITUS WITH MICROALBUMINURIA, WITHOUT LONG-TERM CURRENT USE OF INSULIN: ICD-10-CM

## 2025-06-19 DIAGNOSIS — I10 ESSENTIAL HYPERTENSION: ICD-10-CM

## 2025-06-19 PROBLEM — E66.01 MORBID OBESITY WITH BMI OF 40.0-44.9, ADULT: Status: RESOLVED | Noted: 2017-04-26 | Resolved: 2025-06-19

## 2025-06-19 PROCEDURE — 1159F MED LIST DOCD IN RCRD: CPT | Mod: CPTII,S$GLB,, | Performed by: INTERNAL MEDICINE

## 2025-06-19 PROCEDURE — 99396 PREV VISIT EST AGE 40-64: CPT | Mod: S$GLB,,, | Performed by: INTERNAL MEDICINE

## 2025-06-19 PROCEDURE — 73030 X-RAY EXAM OF SHOULDER: CPT | Mod: TC,FY,LT

## 2025-06-19 PROCEDURE — 3061F NEG MICROALBUMINURIA REV: CPT | Mod: CPTII,S$GLB,, | Performed by: INTERNAL MEDICINE

## 2025-06-19 PROCEDURE — 3078F DIAST BP <80 MM HG: CPT | Mod: CPTII,S$GLB,, | Performed by: INTERNAL MEDICINE

## 2025-06-19 PROCEDURE — 4010F ACE/ARB THERAPY RXD/TAKEN: CPT | Mod: CPTII,S$GLB,, | Performed by: INTERNAL MEDICINE

## 2025-06-19 PROCEDURE — 3074F SYST BP LT 130 MM HG: CPT | Mod: CPTII,S$GLB,, | Performed by: INTERNAL MEDICINE

## 2025-06-19 PROCEDURE — 99999 PR PBB SHADOW E&M-EST. PATIENT-LVL IV: CPT | Mod: PBBFAC,,, | Performed by: INTERNAL MEDICINE

## 2025-06-19 PROCEDURE — 3008F BODY MASS INDEX DOCD: CPT | Mod: CPTII,S$GLB,, | Performed by: INTERNAL MEDICINE

## 2025-06-19 PROCEDURE — 3044F HG A1C LEVEL LT 7.0%: CPT | Mod: CPTII,S$GLB,, | Performed by: INTERNAL MEDICINE

## 2025-06-19 PROCEDURE — 73030 X-RAY EXAM OF SHOULDER: CPT | Mod: 26,LT,, | Performed by: RADIOLOGY

## 2025-06-19 PROCEDURE — 3066F NEPHROPATHY DOC TX: CPT | Mod: CPTII,S$GLB,, | Performed by: INTERNAL MEDICINE

## 2025-06-19 RX ORDER — SEMAGLUTIDE 2.68 MG/ML
2 INJECTION, SOLUTION SUBCUTANEOUS
Qty: 3 ML | Refills: 6 | Status: SHIPPED | OUTPATIENT
Start: 2025-06-19 | End: 2026-06-19

## 2025-06-19 RX ORDER — LOSARTAN POTASSIUM 100 MG/1
100 TABLET ORAL DAILY
Qty: 90 TABLET | Refills: 3 | Status: SHIPPED | OUTPATIENT
Start: 2025-06-19

## 2025-06-19 RX ORDER — AMLODIPINE BESYLATE 10 MG/1
10 TABLET ORAL DAILY
Qty: 90 TABLET | Refills: 3 | Status: SHIPPED | OUTPATIENT
Start: 2025-06-19

## 2025-06-19 RX ORDER — ATORVASTATIN CALCIUM 40 MG/1
40 TABLET, FILM COATED ORAL DAILY
Qty: 90 TABLET | Refills: 3 | Status: SHIPPED | OUTPATIENT
Start: 2025-06-19

## 2025-06-19 NOTE — Clinical Note
Good afternoon Elias:  You had seen her in 2023, there was some discussion about having her do an inpatient CPAP titration but this never transpired.  The great news is she has lost over 40 lb on Ozempic and does not have any current symptoms.  Not sure if you still want to pursue this inpatient CPAP titration?  She says she did not hear from anybody about scheduling this, I know it is sometimes challenging to close the loop on this!  LB

## 2025-06-19 NOTE — PROGRESS NOTES
Patient ID: Amanda Ponce is a 59 y.o. female.    Chief Complaint: Annual Exam      Assessment:       1. Annual physical exam    2. Type 2 diabetes mellitus with microalbuminuria, without long-term current use of insulin    3. Hypertension associated with diabetes    4. Hyperlipidemia associated with type 2 diabetes mellitus    5. Elevated total protein:  See Hematology assessment 4/22 [facility]    6. Class 1 obesity with serious comorbidity and body mass index (BMI) of 33.0 to 33.9 in adult, unspecified obesity type    7. Chronic left shoulder pain    8. Encounter for screening for cardiovascular disorders    9. Essential hypertension    10. Mixed hyperlipidemia       Plan:           1. Annual physical exam    2. Type 2 diabetes mellitus with microalbuminuria, without long-term current use of insulin  -     Comprehensive Metabolic Panel; Future; Expected date: 12/20/2025  -     Hemoglobin A1C; Future; Expected date: 12/16/2025  -     CBC Without Differential; Future; Expected date: 10/19/2025  -     semaglutide (OZEMPIC) 2 mg/dose (8 mg/3 mL) PnIj; Inject 2 mg into the skin every 7 days.  Dispense: 3 mL; Refill: 6    3. Hypertension associated with diabetes:  Stable on regimen.  Low salt diet, exercise, 10-# weight loss in next 6-12 months' time.  Call if BP > 130/80 on a regular basis.    4. Hyperlipidemia associated with type 2 diabetes mellitus.  Stable on regimen    5. Elevated total protein:  See Hematology assessment 4/22 [facility]- will continue to monitor  Overview:  chronic mild elevation of total protein dating back to at least 2008 in setting of polyclonal SPEP and normal  SIEF/CBC/CMP  -given lack of symptoms, normal SPEP/SIEF results and chronicity of TP finding have extremely low index of suspicion for primary hematologic disorder  -suspect her stable mild elevation of KLc is function of GFR rather than occult bone marrow process  -she has no signs/symptoms to suggest occult malignancy, infection,  or autoimmune disease as etiology of her lab findings  -suspect this is her physiologic TP baseline  -repeat serum studies from march 2022 remain stable  -given such  low clinical index of suspicion for primary bone marrow process, we will sign off; recommend pcp check cbc, cmp annually and refer back if any concerning changes      6. Class 1 obesity with serious comorbidity and body mass index (BMI) of 33.0 to 33.9 in adult, unspecified obesity type- much improved.  Continue with healthy eating, exercise, good sleep hygiene and slow and steady weight loss    7. Chronic left shoulder pain  -     X-Ray Shoulder Trauma 3 view Left; Future; Expected date: 06/19/2025  -     Ambulatory referral/consult to Orthopedics; Future; Expected date: 06/26/2025    8. Encounter for screening for cardiovascular disorders  -     CT Cardiac Scoring; Future; Expected date: 06/19/2025    9. Essential hypertension  -     amLODIPine (NORVASC) 10 MG tablet; Take 1 tablet (10 mg total) by mouth once daily.  Dispense: 90 tablet; Refill: 3  -     losartan (COZAAR) 100 MG tablet; Take 1 tablet (100 mg total) by mouth once daily.  Dispense: 90 tablet; Refill: 3    10. Mixed hyperlipidemia  -     atorvastatin (LIPITOR) 40 MG tablet; Take 1 tablet (40 mg total) by mouth once daily.  Dispense: 90 tablet; Refill: 3     Patient has had eye exam done outside of [facility] this year, she brings in records, will scan to chart  I will review all studies and determine further tx depending on findings  If all goes well, anticipate Labs only in six-months and see me on an annual basis, return sooner with problems in the interim    Also discussed with patient sleep apnea.  She feels she does not have any current issues and has been able to lose weight.  She was supposed to have a CPAP titration which never transpired.  Will route chart to her sleep Medicine team.    Subjective:   History of Present Illness    CHIEF COMPLAINT:  Patient presents today for annual  exam.    WEIGHT MANAGEMENT:  She reports significant weight loss of approximately 40 lbs over the past year, noting her clothing has become loose. She remains physically active with her .  She has been tolerating Ozempic well.  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 She denies previous rotator cuff injury or specific traumatic event. She experiences occasional tension in the shoulder area without radiating pain. Sleep is affected as she cannot lie on her left side, typically sleeping on her abdomen with arm under her cheek. She manages symptoms with OTC ibuprofen and sleep position modification.     PAST MEDICAL HISTORY:  She has a history of elevated protein levels in labs, previously evaluated by hematology in  with no significant concerns identified.    FAMILY HISTORY:  Mother (Deepak) has hypertension. Father is  with history of diabetes and COPD. Brothers Marco Antonio and Tomas both have hypertension. No known family history of cancer.    SOCIAL HISTORY:  She reports occasional alcohol use. Her activity level is primarily work-focused with minimal dedicated exercise, though she maintains daily movement tracked by watch for step count and standing reminders.    CURRENT MEDICATIONS:  She takes Ozempic (1-2 refills remaining at [facility] pharmacy), cholesterol medication 40 mg, and OTC vitamin D.      ROS:  General: -fever, -chills, -fatigue, -weight gain, -weight loss  Eyes: -vision changes, -redness, -discharge  ENT: -ear pain, -nasal congestion, -sore throat  Cardiovascular: -chest pain, -palpitations, -lower extremity edema  Respiratory: -cough, -shortness of breath  Gastrointestinal: -abdominal pain, -nausea, -vomiting, -diarrhea, -constipation, -blood in stool  Genitourinary:  -dysuria, -hematuria, -frequency  Musculoskeletal: -joint pain, +muscle pain, +pain with movement  Skin: -rash, -lesion  Neurological: -headache, -dizziness, -numbness, -tingling  Psychiatric: -anxiety, -depression, -sleep difficulty          Patient Active Problem List  Diagnosis    Hyperlipidemia associated with type 2 diabetes mellitus    Vitamin D deficiency disease    Hypertension associated with diabetes    Type 2 diabetes mellitus with microalbuminuria, without long-term current use of insulin    Limited range of motion (ROM) of shoulder    Sleep apnea: severe per HST 7/23    Digital mucous cyst of finger    Positive TERRIE (antinuclear antibody): 1:160 8/24    Elevated total protein:  See Hematology assessment 4/22 [facility]    Class 1 obesity with serious comorbidity and body mass index (BMI) of 33.0 to 33.9 in adult          Objective:      Physical Exam  Vitals and nursing note reviewed.   Constitutional:       Appearance: She is well-developed.   HENT:      Head: Normocephalic and atraumatic.      Right Ear: External ear normal.      Left Ear: External ear normal.      Nose: Nose normal.      Mouth/Throat:      Pharynx: No oropharyngeal exudate.   Eyes:      General: No scleral icterus.     Extraocular Movements: Extraocular movements intact.      Conjunctiva/sclera: Conjunctivae normal.   Neck:      Thyroid: No thyromegaly.      Vascular: No JVD.   Cardiovascular:      Rate and Rhythm: Normal rate and regular rhythm.      Pulses:           Dorsalis pedis pulses are 2+ on the right side and 2+ on the left side.        Posterior tibial pulses are 2+ on the right side and 2+ on the left side.      Heart sounds: Normal heart sounds. No murmur heard.     No gallop.   Pulmonary:      Effort: Pulmonary effort is normal. No respiratory distress.      Breath sounds: Normal breath sounds. No wheezing.   Abdominal:      General: Bowel sounds are normal. There is no distension.      Palpations: Abdomen is soft. There  is no mass.      Tenderness: There is no abdominal tenderness. There is no guarding or rebound.   Musculoskeletal:         General: No tenderness. Normal range of motion.      Cervical back: Normal range of motion and neck supple.      Right foot: Normal range of motion. No deformity or bunion.      Left foot: Normal range of motion. No deformity or bunion.      Comments: Pain with internal rotation of the left shoulder, slight decreased range of motion   Feet:      Right foot:      Protective Sensation: 5 sites tested.  5 sites sensed.      Left foot:      Protective Sensation: 5 sites tested.  5 sites sensed.   Lymphadenopathy:      Cervical: No cervical adenopathy.   Skin:     General: Skin is warm.      Findings: No erythema or rash.   Neurological:      General: No focal deficit present.      Mental Status: She is alert and oriented to person, place, and time.      Cranial Nerves: No cranial nerve deficit.      Coordination: Coordination normal.   Psychiatric:         Behavior: Behavior normal.         Thought Content: Thought content normal.         Judgment: Judgment normal.             Health Maintenance Due   Topic Date Due    Diabetic Eye Exam  04/29/2025    Foot Exam  05/22/2025        This note was generated with the assistance of ambient listening technology. Verbal consent was obtained by the patient and accompanying visitor(s) for the recording of patient appointment to facilitate this note. I attest to having reviewed and edited the generated note for accuracy, though some syntax or spelling errors may persist. Please contact the author of this note for any clarification.

## 2025-06-24 ENCOUNTER — PATIENT OUTREACH (OUTPATIENT)
Dept: ADMINISTRATIVE | Facility: HOSPITAL | Age: 60
End: 2025-06-24
Payer: COMMERCIAL

## 2025-06-24 NOTE — PROGRESS NOTES
Population Health Chart Review & Patient Outreach Details      Additional Pop Health Notes:               Updates Requested / Reviewed:      Updated Care Coordination Note and          Health Maintenance Topics Overdue:      [facility] Score: 1     Foot Exam                       Health Maintenance Topic(s) Outreach Outcomes & Actions Taken:    Eye Exam - Outreach Outcomes & Actions Taken  : Diabetic Eye External Records Uploaded, Care Team & History Updated if Applicable

## 2025-06-27 ENCOUNTER — HOSPITAL ENCOUNTER (OUTPATIENT)
Dept: RADIOLOGY | Facility: HOSPITAL | Age: 60
Discharge: HOME | End: 2025-06-27
Attending: INTERNAL MEDICINE
Payer: COMMERCIAL

## 2025-06-27 DIAGNOSIS — Z13.6 ENCOUNTER FOR SCREENING FOR CARDIOVASCULAR DISORDERS: ICD-10-CM

## 2025-06-27 PROCEDURE — 75571 CT HRT W/O DYE W/CA TEST: CPT | Mod: 26,,, | Performed by: RADIOLOGY

## 2025-06-27 PROCEDURE — 75571 CT HRT W/O DYE W/CA TEST: CPT | Mod: TC

## 2025-07-07 ENCOUNTER — TELEPHONE (OUTPATIENT)
Dept: ORTHOPEDICS | Facility: CLINIC | Age: 60
End: 2025-07-07
Payer: COMMERCIAL

## 2025-07-07 NOTE — TELEPHONE ENCOUNTER
Spoke c pt. Confirmed appt time and location with Dr. Huang. Patient expressed understanding & was thankful.

## 2025-07-10 ENCOUNTER — OFFICE VISIT (OUTPATIENT)
Dept: ORTHOPEDICS | Facility: CLINIC | Age: 60
End: 2025-07-10
Payer: COMMERCIAL

## 2025-07-10 VITALS — WEIGHT: 164.88 LBS | HEIGHT: 59 IN | BODY MASS INDEX: 33.24 KG/M2

## 2025-07-10 DIAGNOSIS — M25.512 CHRONIC LEFT SHOULDER PAIN: ICD-10-CM

## 2025-07-10 DIAGNOSIS — M75.32 CALCIFIC TENDINITIS OF LEFT SHOULDER: Primary | ICD-10-CM

## 2025-07-10 DIAGNOSIS — G89.29 CHRONIC LEFT SHOULDER PAIN: ICD-10-CM

## 2025-07-10 PROCEDURE — 20610 DRAIN/INJ JOINT/BURSA W/O US: CPT | Mod: LT,S$GLB,, | Performed by: ORTHOPAEDIC SURGERY

## 2025-07-10 PROCEDURE — 99999 PR PBB SHADOW E&M-EST. PATIENT-LVL IV: CPT | Mod: PBBFAC,,, | Performed by: ORTHOPAEDIC SURGERY

## 2025-07-10 PROCEDURE — 3066F NEPHROPATHY DOC TX: CPT | Mod: CPTII,S$GLB,, | Performed by: ORTHOPAEDIC SURGERY

## 2025-07-10 PROCEDURE — 99214 OFFICE O/P EST MOD 30 MIN: CPT | Mod: 25,S$GLB,, | Performed by: ORTHOPAEDIC SURGERY

## 2025-07-10 PROCEDURE — 3008F BODY MASS INDEX DOCD: CPT | Mod: CPTII,S$GLB,, | Performed by: ORTHOPAEDIC SURGERY

## 2025-07-10 PROCEDURE — 4010F ACE/ARB THERAPY RXD/TAKEN: CPT | Mod: CPTII,S$GLB,, | Performed by: ORTHOPAEDIC SURGERY

## 2025-07-10 PROCEDURE — 3044F HG A1C LEVEL LT 7.0%: CPT | Mod: CPTII,S$GLB,, | Performed by: ORTHOPAEDIC SURGERY

## 2025-07-10 PROCEDURE — 3061F NEG MICROALBUMINURIA REV: CPT | Mod: CPTII,S$GLB,, | Performed by: ORTHOPAEDIC SURGERY

## 2025-07-10 PROCEDURE — 1159F MED LIST DOCD IN RCRD: CPT | Mod: CPTII,S$GLB,, | Performed by: ORTHOPAEDIC SURGERY

## 2025-07-10 RX ADMIN — TRIAMCINOLONE ACETONIDE 80 MG: 40 INJECTION, SUSPENSION INTRA-ARTICULAR; INTRAMUSCULAR at 09:07

## 2025-07-10 NOTE — PROCEDURES
Large Joint Aspiration/Injection: L subacromial bursa    Date/Time: 7/10/2025 9:45 AM    Performed by: Malini Mai MD  Authorized by: Malini Mai MD    Consent Done?:  Yes (Verbal)  Indications:  Pain  Timeout: prior to procedure the correct patient, procedure, and site was verified      Local anesthesia used?: Yes    Anesthesia:  Local infiltration  Local anesthetic:  Lidocaine 1% without epinephrine    Details:  Needle Size:  22 G  Approach:  Posterior  Location:  Shoulder  Site:  L subacromial bursa  Medications:  80 mg triamcinolone acetonide 40 mg/mL

## 2025-07-14 RX ORDER — TRIAMCINOLONE ACETONIDE 40 MG/ML
80 INJECTION, SUSPENSION INTRA-ARTICULAR; INTRAMUSCULAR
Status: DISCONTINUED | OUTPATIENT
Start: 2025-07-10 | End: 2025-07-14 | Stop reason: HOSPADM

## 2025-07-14 NOTE — PROGRESS NOTES
Patient ID: Amanda Ponce is a 59 y.o. female.    Chief Complaint: Pain of the Left Shoulder    History of Present Illness    CHIEF COMPLAINT:  Left shoulder pain    HPI:  Ms. Ponce presents with left shoulder pain ongoing for a few months. Pain is localized to the left shoulder, described as 5/10 to 6/10 in severity. She reports discomfort, particularly when sleeping, stating it is very uncomfortable. Some stiffness is noted, mainly in the affected shoulder. 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 Pain interferes with sleep and causes some discomfort when getting dressed, though she denies loss of motion. Ms. Ponce is diabetic and takes Metformin. She denies taking insulin.    MEDICATIONS:  Ms. Ponce is on Metformin.    WORK STATUS:  Ms. Ponce works dealing with charts.    IMAGING:  An X-ray of the left shoulder revealed calcification in the rotator cuff, consistent with calcific tendinitis of the rotator cuff.      ROS:  General: denies fever, denies chills, denies fatigue, denies weight gain, denies weight loss  Eyes: denies vision changes, denies redness, denies discharge  ENT: denies ear pain, denies nasal congestion, denies sore throat  Cardiovascular: denies chest pain, denies palpitations, denies lower extremity edema  Respiratory: denies cough, denies shortness of breath  Gastrointestinal: denies abdominal pain, denies nausea, denies vomiting, denies diarrhea, denies constipation, denies blood in stool  Genitourinary: denies dysuria, denies hematuria, denies frequency  Musculoskeletal: reports joint pain, denies muscle pain, reports limb pain, reports nightime pain, reports muscle stiffness, reports pain with movement  Skin: denies rash, denies lesion  Neurological: denies headache, denies dizziness, denies numbness, denies tingling  Psychiatric: denies anxiety, denies depression, denies sleep difficulty         Physical Exam     Musculoskeletal: Full range of motion of the wrist bilaterally. Full range of motion of the elbows bilaterally. Shoulder range of motion symmetric bilaterally at 160 degrees. Active external shoulder rotation 61 - 75 degrees (Left). Active external shoulder rotation 61 - 75 degrees (Right). Full composite fist bilaterally. Internal rotation to T7 bilaterally. Good scapular strength. Pain on cross body adduction. Positive impingement.  Neurological: PIN intact bilaterally. AIN intact bilaterally. Ulnar motor intact bilaterally.         Assessment & Plan     Discussed steroid injection and PT as treatment for calcific tendinitis of the rotator cuff.   Ms. Ponce agreed to proceed with injection.   Steroid injection may provide immediate relief but full effect takes a few days to start working.   Mentioned possibility of arthroscopic debridement if conservative treatment fails.              No follow-ups on file.    This note was generated with the assistance of ambient listening technology. Verbal consent was obtained by the patient and accompanying visitor(s) for the recording of patient appointment to facilitate this note. I attest to having reviewed and edited the generated note for accuracy, though some syntax or spelling errors may persist. Please contact the author of this note for any clarification.

## 2025-07-15 ENCOUNTER — CLINICAL SUPPORT (OUTPATIENT)
Dept: REHABILITATION | Facility: HOSPITAL | Age: 60
End: 2025-07-15
Attending: ORTHOPAEDIC SURGERY
Payer: COMMERCIAL

## 2025-07-15 DIAGNOSIS — M75.32 CALCIFIC TENDINITIS OF LEFT SHOULDER: ICD-10-CM

## 2025-07-15 DIAGNOSIS — R29.898 DECREASED STRENGTH OF UPPER EXTREMITY: Primary | ICD-10-CM

## 2025-07-15 PROCEDURE — 97530 THERAPEUTIC ACTIVITIES: CPT

## 2025-07-15 PROCEDURE — 97161 PT EVAL LOW COMPLEX 20 MIN: CPT

## 2025-07-15 NOTE — PROGRESS NOTES
"  Outpatient Rehab    Physical Therapy Evaluation    Patient Name: Amanda Ponce  MRN: [id number]  YOB: 1965  Encounter Date: 7/15/2025    Therapy Diagnosis:   Encounter Diagnoses   Name Primary?    Calcific tendinitis of left shoulder     Decreased strength of upper extremity Yes     Physician: Malini Mai, *    Physician Orders: Eval and Treat  Medical Diagnosis: Calcific tendinitis of left shoulder  Surgical Diagnosis: Not applicable for this Episode   Surgical Date: Not applicable for this Episode  Days Since Last Surgery: Not applicable for this Episode    Visit # / Visits Authorized:  1 / 1  Insurance Authorization Period: 7/10/2025 to 12/31/2025  Date of Evaluation: 7/15/2025  Plan of Care Certification: 7/15/2025 to 9/26/2025     Time In: 0905   Time Out: 0959  Total Time (in minutes): 54   Total Billable Time (in minutes): 54    Intake Outcome Measure for FOTO Survey    Therapist reviewed FOTO scores for Amanda Ponce on 7/15/2025.   FOTO report - see Media section or FOTO account episode details.     Intake Score (%): Not applicable for this Episode    Precautions:       Subjective   History of Present Illness  Amanda is a 59 y.o. female who reports to physical therapy with a chief concern of left shoulder pain.     The patient reports a medical diagnosis of M75.32 (ICD-10-CM) - Calcific tendinitis of left shoulder.            Dominant Hand: Right  History of Present Condition/Illness: Patient reports chronic anterior left shoulder pain for the last several months. Patient reports she received a steroid injection last week into the L shoulder and  has had "good" relief with this. Denies any pain past her elbow or N/T. Reports pain when reaching OH and when lifting heavy items. 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    Activities of Daily Living  Social history was obtained from Patient.               Previously " independent with activities of daily living? Yes     Currently independent with activities of daily living? Yes          Previously independent with instrumental activities of daily living? Yes     Currently independent with instrumental activities of daily living? Yes              Pain     Patient reports a current pain level of 0/10. Pain at best is reported as 0/10. Pain at worst is reported as 8/10.   Location: anterior left shoulder  Clinical Progression (since onset): Stable  Pain Qualities: Aching  Pain-Relieving Factors: Rest  Pain-Aggravating Factors: Movement         Review of Systems  Patient denies: Cancer History, Cardiac History, and Diabetes        Treatment History  Treatments  Previously Received Treatments: Yes  Currently Receiving Treatments: No  Additional Treatment Details: Steroid injection    Living Arrangements  Living Situation  Housing: Home independently  Living Arrangements: Spouse/significant other        Employment  Patient reports: Does the patient's condition impact their ability to work?  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    Amanda Ponce  has a past surgical history that includes Hysterectomy; Cardiac surgery; Colonoscopy (N/A, 10/19/2018); and Cyst Removal (Right, 7/12/2024).    Amanda has a current medication list which includes the following prescription(s): amlodipine, aspirin, atorvastatin, blood sugar diagnostic, cholecalciferol (vitamin d3), diclofenac sodium, fluticasone propionate, lancets, losartan, and ozempic.    Review of patient's allergies indicates:   Allergen Reactions    Ace inhibitors      Other reaction(s): cough        Objective          Observation/ Posture: rounded shoulders and forward  head    Palpation: TTP along L long head biceps tendon    Joint mobility: mild hypomobility during posterior glide    Sensation: intact bilateral upper extremity dermatomes    Cervical Range of Motion:  CERVICAL AROM Pain/Dysfunction with Movement   Flexion WFL Denies pain   Extension WFL Denies pain   Right side bending WFL Denies pain   Left side bending WFL Denies pain   Right rotation WFL Denies pain   Left rotation WFL Denies pain     Shoulder Range of Motion:   R Active (Passive) L Active (Passive)   Flexion WFL (WFL) WFL (WFL)   Abduction WFL (WFL) WFL (WFL) pain when returning to neutral   ER WFL (WFL) WFL (WFL)   IR WFL (WFL) WFL (WFL pain at end range)     Functional assessment:  ER: T3 B  IR: T11 B; notes mild discomfort in L shoulder    Upper Extremity Strength: manual muscle grades below   Right  Left   Shoulder FLEX 4+/5 Shoulder FLEX 4+/5   Shoulder ABD 4+/5 Shoulder ABD 3+/5 p!   Shoulder ER 4+/5 Shoulder ER 4/5   Shoulder IR 4+/5 Shoulder IR 4/5 p!   Elbow FLEX 5/5 Elbow Flex 5/5   Elbow EXT 5/5 Elbow EXT 5/5   Wrist FLEX 5/5 Wrist FLEX 5/5   Wrist EXT 5/5 Wrist EXT 5/5     Shoulder Special Test:  Painful Arc: +  ERLS: -  Drop arm: -  Cross-body Adduction: -  Hawkin's Nadeem: +  Treatment:  Therapeutic Activity  TA 1: patient education: HEP, plan of care, prognosis, treatment options, relevant anatomy.  TA 2: please see patient education for all exercises performed and reviewed    Time Entry(in minutes):  PT Evaluation (Low) Time Entry: 54  Therapeutic Activity Time Entry: 26    Assessment & Plan   Assessment  Amanda presents with a condition of Low complexity.   Presentation of Symptoms: Stable  Will Comorbidities Impact Care: No       Functional Limitations: Activity tolerance, Completing self-care activities, Proprioception, Range of motion, Participating in leisure activities, Pain with ADLs/IADLs, Gross motor coordination, Functional mobility  Impairments: Pain with functional activity,  Impaired physical strength  Personal Factors Affecting Prognosis: Pain    Patient Goal for Therapy (PT): decrease pain  Prognosis: Good  Assessment Details: Patient presentd with chief complaint of L shoulder pain; s/s consistent with shoulder pathology likely involving long head of biceps. Patient demonstrates deficits with range of motion, strength, and function that limit ability to participate in school, work, and recreational activities. They would benefit from skilled PT services to normalize kinetic chain mobility, strength, and function to safely return to their prior level of activity.    Plan  From a physical therapy perspective, the patient would benefit from: Skilled Rehab Services    Planned therapy interventions include: Therapeutic exercise, Therapeutic activities, Neuromuscular re-education, Manual therapy, ADLs/IADLs, Other (Comment), and Gait training. Dry Needling (prn)  Planned modalities to include: Biofeedback, Electrical stimulation - attended, Electrical stimulation - passive/unattended, Thermotherapy (hot pack), and Cryotherapy (cold pack).        Visit Frequency: 2 times Per Week for 10 Weeks.       This plan was discussed with Patient.   Discussion participants: Agreed Upon Plan of Care  Plan details: Frequency and duration of treatment to be adjusted as needed          The patient's spiritual, cultural, and educational needs were considered, and the patient is agreeable to the plan of care and goals.           Goals:   Active       long term goals       Pt will improve FOTO score to </= 40% to demonstrate improvements in functional mobility including carrying, moving, and handling objects  (Progressing)       Start:  07/18/25    Expected End:  09/26/25            Pt will improve impaired shoulder MMTs 1 grade B to improve strength for household duties.  (Progressing)       Start:  07/18/25    Expected End:  09/26/25            Pt will demonstrate improved perscapular strength and  endurance to show improved OH mobility and activity tolerance.   (Progressing)       Start:  07/18/25    Expected End:  09/26/25               short term goals       Pt will be independent with HEP to assist PT treatment in restoring pain free motion of the L shoulder. (Progressing)       Start:  07/18/25    Expected End:  08/15/25            Pt will improve impaired shoulder MMTs 1/2 grade B to improve strength for functional tasks.  (Progressing)       Start:  07/18/25    Expected End:  08/15/25             Pt will demonstrate improved postural awareness requiring less than 3 VC during therapeutic activity.  (Progressing)       Start:  07/18/25    Expected End:  08/15/25                Karley Henriquez, PT, DPT, OCS

## (undated) DEVICE — Device

## (undated) DEVICE — SPONGE COTTON TRAY 4X4IN

## (undated) DEVICE — DRAPE STERI-DRAPE 1000 17X11IN

## (undated) DEVICE — FORCEP STRAIGHT DISP

## (undated) DEVICE — NDL 22GA X1 1/2 REG BEVEL

## (undated) DEVICE — SYR B-D DISP CONTROL 10CC100/C

## (undated) DEVICE — BANDAGE MATRIX HK LOOP 2IN 5YD

## (undated) DEVICE — TOWEL OR DISP STRL BLUE 4/PK

## (undated) DEVICE — GOWN ECLIPSE REINF LVL4 TWL XL

## (undated) DEVICE — COVER CAMERA OPERATING ROOM

## (undated) DEVICE — SOL IRR SOD CHL .9% POUR

## (undated) DEVICE — CORD FOR BIPOLAR FORCEPS 12

## (undated) DEVICE — BANDAGE GAUZE 6PLY FLUFF 2X3

## (undated) DEVICE — DRESSING N ADH OIL EMUL 3X3

## (undated) DEVICE — BLADE SURG #15 CARBON STEEL

## (undated) DEVICE — TOURNIQUET SB QC DP 18X4IN

## (undated) DEVICE — SCRUB 10% POVIDONE IODINE 4OZ

## (undated) DEVICE — SUT 4/0 18IN ETHILON BL P3

## (undated) DEVICE — GLOVE BIOGEL PI MICRO INDIC 7

## (undated) DEVICE — CONTAINER SPECIMEN OR STER 4OZ

## (undated) DEVICE — NDL HYPO STD REG BVL 18GX1.5IN

## (undated) DEVICE — GLOVE BIOGEL ECLIPSE SZ 7